# Patient Record
Sex: FEMALE | Race: WHITE | NOT HISPANIC OR LATINO | Employment: FULL TIME | URBAN - METROPOLITAN AREA
[De-identification: names, ages, dates, MRNs, and addresses within clinical notes are randomized per-mention and may not be internally consistent; named-entity substitution may affect disease eponyms.]

---

## 2021-09-05 ENCOUNTER — HOSPITAL ENCOUNTER (EMERGENCY)
Facility: HOSPITAL | Age: 36
Discharge: HOME | End: 2021-09-06
Attending: EMERGENCY MEDICINE

## 2021-09-05 DIAGNOSIS — R06.02 SOB (SHORTNESS OF BREATH): ICD-10-CM

## 2021-09-05 DIAGNOSIS — R11.10 VOMITING, INTRACTABILITY OF VOMITING NOT SPECIFIED, PRESENCE OF NAUSEA NOT SPECIFIED, UNSPECIFIED VOMITING TYPE: ICD-10-CM

## 2021-09-05 DIAGNOSIS — R07.89 CHEST TIGHTNESS: Primary | ICD-10-CM

## 2021-09-05 LAB
ABO + RH BLD: NORMAL
ALBUMIN SERPL BCP-MCNC: 3.5 G/DL (ref 3.5–5.2)
ALP SERPL-CCNC: 78 U/L (ref 55–135)
ALT SERPL W/O P-5'-P-CCNC: 100 U/L (ref 10–44)
ANION GAP SERPL CALC-SCNC: 16 MMOL/L (ref 8–16)
APTT BLDCRRT: 29.6 SEC (ref 21–32)
AST SERPL-CCNC: 224 U/L (ref 10–40)
BASOPHILS # BLD AUTO: 0.06 K/UL (ref 0–0.2)
BASOPHILS NFR BLD: 1.1 % (ref 0–1.9)
BILIRUB SERPL-MCNC: 1.4 MG/DL (ref 0.1–1)
BLD GP AB SCN CELLS X3 SERPL QL: NORMAL
BUN SERPL-MCNC: 8 MG/DL (ref 6–20)
CALCIUM SERPL-MCNC: 9 MG/DL (ref 8.7–10.5)
CHLORIDE SERPL-SCNC: 102 MMOL/L (ref 95–110)
CO2 SERPL-SCNC: 22 MMOL/L (ref 23–29)
CREAT SERPL-MCNC: 0.8 MG/DL (ref 0.5–1.4)
DIFFERENTIAL METHOD: ABNORMAL
EOSINOPHIL # BLD AUTO: 0.1 K/UL (ref 0–0.5)
EOSINOPHIL NFR BLD: 1.1 % (ref 0–8)
ERYTHROCYTE [DISTWIDTH] IN BLOOD BY AUTOMATED COUNT: 16.7 % (ref 11.5–14.5)
EST. GFR  (AFRICAN AMERICAN): >60 ML/MIN/1.73 M^2
EST. GFR  (NON AFRICAN AMERICAN): >60 ML/MIN/1.73 M^2
GLUCOSE SERPL-MCNC: 106 MG/DL (ref 70–110)
HCT VFR BLD AUTO: 39.3 % (ref 37–48.5)
HCV AB SERPL QL IA: NEGATIVE
HEP C VIRUS HOLD SPECIMEN: NORMAL
HGB BLD-MCNC: 13.3 G/DL (ref 12–16)
HIV 1+2 AB+HIV1 P24 AG SERPL QL IA: NEGATIVE
IMM GRANULOCYTES # BLD AUTO: 0.02 K/UL (ref 0–0.04)
IMM GRANULOCYTES NFR BLD AUTO: 0.4 % (ref 0–0.5)
INR PPP: 1.3 (ref 0.8–1.2)
LIPASE SERPL-CCNC: 26 U/L (ref 4–60)
LYMPHOCYTES # BLD AUTO: 1.7 K/UL (ref 1–4.8)
LYMPHOCYTES NFR BLD: 30.6 % (ref 18–48)
MCH RBC QN AUTO: 32.9 PG (ref 27–31)
MCHC RBC AUTO-ENTMCNC: 33.8 G/DL (ref 32–36)
MCV RBC AUTO: 97 FL (ref 82–98)
MONOCYTES # BLD AUTO: 0.6 K/UL (ref 0.3–1)
MONOCYTES NFR BLD: 10.1 % (ref 4–15)
NEUTROPHILS # BLD AUTO: 3.1 K/UL (ref 1.8–7.7)
NEUTROPHILS NFR BLD: 56.7 % (ref 38–73)
NRBC BLD-RTO: 0 /100 WBC
PLATELET # BLD AUTO: 87 K/UL (ref 150–450)
PMV BLD AUTO: 10 FL (ref 9.2–12.9)
POTASSIUM SERPL-SCNC: 3.5 MMOL/L (ref 3.5–5.1)
PROT SERPL-MCNC: 8.6 G/DL (ref 6–8.4)
PROTHROMBIN TIME: 13.9 SEC (ref 9–12.5)
RBC # BLD AUTO: 4.04 M/UL (ref 4–5.4)
SODIUM SERPL-SCNC: 140 MMOL/L (ref 136–145)
TROPONIN I SERPL DL<=0.01 NG/ML-MCNC: <0.006 NG/ML (ref 0–0.03)
WBC # BLD AUTO: 5.43 K/UL (ref 3.9–12.7)

## 2021-09-05 PROCEDURE — 80053 COMPREHEN METABOLIC PANEL: CPT | Performed by: EMERGENCY MEDICINE

## 2021-09-05 PROCEDURE — 83690 ASSAY OF LIPASE: CPT | Performed by: EMERGENCY MEDICINE

## 2021-09-05 PROCEDURE — 99285 EMERGENCY DEPT VISIT HI MDM: CPT | Mod: 25

## 2021-09-05 PROCEDURE — 93010 EKG 12-LEAD: ICD-10-PCS | Mod: ,,, | Performed by: INTERNAL MEDICINE

## 2021-09-05 PROCEDURE — C9113 INJ PANTOPRAZOLE SODIUM, VIA: HCPCS | Performed by: EMERGENCY MEDICINE

## 2021-09-05 PROCEDURE — 85025 COMPLETE CBC W/AUTO DIFF WBC: CPT | Performed by: EMERGENCY MEDICINE

## 2021-09-05 PROCEDURE — 96361 HYDRATE IV INFUSION ADD-ON: CPT

## 2021-09-05 PROCEDURE — 87389 HIV-1 AG W/HIV-1&-2 AB AG IA: CPT | Performed by: EMERGENCY MEDICINE

## 2021-09-05 PROCEDURE — 63600175 PHARM REV CODE 636 W HCPCS: Performed by: EMERGENCY MEDICINE

## 2021-09-05 PROCEDURE — 85730 THROMBOPLASTIN TIME PARTIAL: CPT | Performed by: EMERGENCY MEDICINE

## 2021-09-05 PROCEDURE — 86803 HEPATITIS C AB TEST: CPT | Performed by: EMERGENCY MEDICINE

## 2021-09-05 PROCEDURE — 96374 THER/PROPH/DIAG INJ IV PUSH: CPT

## 2021-09-05 PROCEDURE — 84484 ASSAY OF TROPONIN QUANT: CPT | Performed by: EMERGENCY MEDICINE

## 2021-09-05 PROCEDURE — 93005 ELECTROCARDIOGRAM TRACING: CPT

## 2021-09-05 PROCEDURE — 86900 BLOOD TYPING SEROLOGIC ABO: CPT | Performed by: EMERGENCY MEDICINE

## 2021-09-05 PROCEDURE — 93010 ELECTROCARDIOGRAM REPORT: CPT | Mod: ,,, | Performed by: INTERNAL MEDICINE

## 2021-09-05 PROCEDURE — 85610 PROTHROMBIN TIME: CPT | Performed by: EMERGENCY MEDICINE

## 2021-09-05 RX ORDER — ONDANSETRON 2 MG/ML
4 INJECTION INTRAMUSCULAR; INTRAVENOUS
Status: DISCONTINUED | OUTPATIENT
Start: 2021-09-05 | End: 2021-09-06 | Stop reason: HOSPADM

## 2021-09-05 RX ORDER — PANTOPRAZOLE SODIUM 40 MG/10ML
40 INJECTION, POWDER, LYOPHILIZED, FOR SOLUTION INTRAVENOUS
Status: COMPLETED | OUTPATIENT
Start: 2021-09-05 | End: 2021-09-05

## 2021-09-05 RX ADMIN — SODIUM CHLORIDE, SODIUM LACTATE, POTASSIUM CHLORIDE, AND CALCIUM CHLORIDE 1000 ML: .6; .31; .03; .02 INJECTION, SOLUTION INTRAVENOUS at 11:09

## 2021-09-05 RX ADMIN — PANTOPRAZOLE SODIUM 40 MG: 40 INJECTION, POWDER, FOR SOLUTION INTRAVENOUS at 10:09

## 2021-09-06 VITALS
HEART RATE: 71 BPM | HEIGHT: 70 IN | BODY MASS INDEX: 28.95 KG/M2 | TEMPERATURE: 98 F | WEIGHT: 202.25 LBS | SYSTOLIC BLOOD PRESSURE: 172 MMHG | RESPIRATION RATE: 18 BRPM | DIASTOLIC BLOOD PRESSURE: 77 MMHG | OXYGEN SATURATION: 96 %

## 2021-09-06 LAB
B-HCG UR QL: NEGATIVE
BILIRUB UR QL STRIP: ABNORMAL
CLARITY UR: CLEAR
COLOR UR: YELLOW
GLUCOSE UR QL STRIP: NEGATIVE
HGB UR QL STRIP: NEGATIVE
KETONES UR QL STRIP: NEGATIVE
LEUKOCYTE ESTERASE UR QL STRIP: NEGATIVE
NITRITE UR QL STRIP: NEGATIVE
PH UR STRIP: 7 [PH] (ref 5–8)
PROT UR QL STRIP: ABNORMAL
SP GR UR STRIP: 1.02 (ref 1–1.03)
URN SPEC COLLECT METH UR: ABNORMAL
UROBILINOGEN UR STRIP-ACNC: 1 EU/DL

## 2021-09-06 PROCEDURE — 81003 URINALYSIS AUTO W/O SCOPE: CPT | Performed by: EMERGENCY MEDICINE

## 2021-09-06 PROCEDURE — 25500020 PHARM REV CODE 255: Performed by: EMERGENCY MEDICINE

## 2021-09-06 PROCEDURE — 63600175 PHARM REV CODE 636 W HCPCS: Performed by: EMERGENCY MEDICINE

## 2021-09-06 PROCEDURE — 81025 URINE PREGNANCY TEST: CPT | Performed by: EMERGENCY MEDICINE

## 2021-09-06 RX ORDER — SULFAMETHOXAZOLE AND TRIMETHOPRIM 800; 160 MG/1; MG/1
160 TABLET ORAL
Status: ON HOLD | COMMUNITY
Start: 2020-01-08 | End: 2023-10-02 | Stop reason: CLARIF

## 2021-09-06 RX ORDER — ONDANSETRON 4 MG/1
4 TABLET, ORALLY DISINTEGRATING ORAL EVERY 8 HOURS PRN
Qty: 11 TABLET | Refills: 0 | Status: SHIPPED | OUTPATIENT
Start: 2021-09-06 | End: 2021-09-09

## 2021-09-06 RX ORDER — DEXTROMETHORPHAN HYDROBROMIDE, GUAIFENESIN 5; 100 MG/5ML; MG/5ML
650 LIQUID ORAL EVERY 8 HOURS PRN
Status: ON HOLD | COMMUNITY
End: 2023-10-02 | Stop reason: CLARIF

## 2021-09-06 RX ORDER — PANTOPRAZOLE SODIUM 40 MG/1
40 TABLET, DELAYED RELEASE ORAL DAILY
Qty: 30 TABLET | Refills: 0 | Status: CANCELLED | OUTPATIENT
Start: 2021-09-06 | End: 2021-10-06

## 2021-09-06 RX ORDER — CHLORDIAZEPOXIDE HYDROCHLORIDE 25 MG/1
25 CAPSULE, GELATIN COATED ORAL 3 TIMES DAILY PRN
Status: ON HOLD | COMMUNITY
End: 2023-10-02 | Stop reason: CLARIF

## 2021-09-06 RX ORDER — PANTOPRAZOLE SODIUM 20 MG/1
40 TABLET, DELAYED RELEASE ORAL
COMMUNITY
Start: 2021-01-30 | End: 2021-09-06 | Stop reason: SDUPTHER

## 2021-09-06 RX ORDER — LANOLIN ALCOHOL/MO/W.PET/CERES
1 CREAM (GRAM) TOPICAL DAILY
COMMUNITY
Start: 2021-01-30 | End: 2022-01-30

## 2021-09-06 RX ORDER — FOLIC ACID 1 MG/1
1 TABLET ORAL DAILY
Status: ON HOLD | COMMUNITY
Start: 2021-01-30 | End: 2023-10-02 | Stop reason: CLARIF

## 2021-09-06 RX ORDER — LACTULOSE 10 G/15ML
10 SOLUTION ORAL; RECTAL
Status: ON HOLD | COMMUNITY
Start: 2020-01-08 | End: 2023-10-02 | Stop reason: CLARIF

## 2021-09-06 RX ORDER — MULTIVITAMIN
1 TABLET ORAL DAILY
COMMUNITY
Start: 2021-01-30 | End: 2022-01-30

## 2021-09-06 RX ORDER — PANTOPRAZOLE SODIUM 40 MG/1
40 TABLET, DELAYED RELEASE ORAL DAILY
Qty: 30 TABLET | Refills: 0 | Status: SHIPPED | OUTPATIENT
Start: 2021-09-06

## 2021-09-06 RX ADMIN — IOHEXOL 100 ML: 350 INJECTION, SOLUTION INTRAVENOUS at 04:09

## 2023-09-20 ENCOUNTER — TELEPHONE (OUTPATIENT)
Dept: TRANSPLANT | Facility: CLINIC | Age: 38
End: 2023-09-20
Payer: MEDICAID

## 2023-09-20 NOTE — TELEPHONE ENCOUNTER
"Hx of gastric bypass 11 years ago.  She stated history of fatty liver. She reports hx of alcohol last 3 years '"heavy" per pt. Hx hospitalized x 3 told of liver issues and stopped drinking. Pt reports while on vacation in August she wasn't felling good.  She went to the local ER bec was not feeling good. A paracentesis (6 liters) was done but fluid returned. Pt stated she was admitted on Sept 9th. Since discharge she has not been seen by anyone, appts scheduled in October at [facility]. Sober since August 31, 2023. Pt informed we will request [place] from [place] for admission 9/6-9/16. Informed I can not move forward till records received. Pt informed to keep U appt.     "

## 2023-09-20 NOTE — TELEPHONE ENCOUNTER
"----- Message from Ramon Jean sent at 9/20/2023  1:22 PM CDT -----  Consult/Advisory:          Name Of Caller: Self      Contact Preference?: [phone]       What is the nature of the call?: Calling to speak w/ Citlali about if she can be self-referred for a Hepatology visit          Additional Notes:  "Thank you for all that you do for our patients"      "

## 2023-09-21 ENCOUNTER — TELEPHONE (OUTPATIENT)
Dept: TRANSPLANT | Facility: CLINIC | Age: 38
End: 2023-09-21
Payer: MEDICAID

## 2023-09-21 NOTE — TELEPHONE ENCOUNTER
----- Message from Marysol Singh sent at 9/21/2023  9:00 AM CDT -----  Regarding: FW: need records    Request submitted via [employer] for: Admissions/Discharge summary for the last 60 days.   .    ----- Message -----  From: Citlali Deluca  Sent: 9/20/2023   3:57 PM CDT  To: Marysol Singh  Subject: need records                                     From [facility]  date Sept 9-sept 16  Thanks.

## 2023-09-25 ENCOUNTER — HOSPITAL ENCOUNTER (INPATIENT)
Facility: HOSPITAL | Age: 38
LOS: 7 days | Discharge: HOME | DRG: 433 | End: 2023-10-02
Attending: EMERGENCY MEDICINE | Admitting: EMERGENCY MEDICINE
Payer: MEDICAID

## 2023-09-25 DIAGNOSIS — K70.31 ASCITES DUE TO ALCOHOLIC CIRRHOSIS: Chronic | ICD-10-CM

## 2023-09-25 DIAGNOSIS — R06.02 SHORTNESS OF BREATH: ICD-10-CM

## 2023-09-25 DIAGNOSIS — K74.60 DECOMPENSATED HEPATIC CIRRHOSIS: Primary | ICD-10-CM

## 2023-09-25 DIAGNOSIS — F10.90 ALCOHOL USE DISORDER: ICD-10-CM

## 2023-09-25 DIAGNOSIS — I82.409 DVT (DEEP VENOUS THROMBOSIS): ICD-10-CM

## 2023-09-25 DIAGNOSIS — E66.01 SEVERE OBESITY (BMI >= 40): ICD-10-CM

## 2023-09-25 DIAGNOSIS — Z90.3 H/O GASTRIC SLEEVE: ICD-10-CM

## 2023-09-25 DIAGNOSIS — R18.8 ASCITES: ICD-10-CM

## 2023-09-25 DIAGNOSIS — F10.20 ALCOHOL USE DISORDER, SEVERE, DEPENDENCE: Chronic | ICD-10-CM

## 2023-09-25 DIAGNOSIS — K72.90 DECOMPENSATED HEPATIC CIRRHOSIS: Primary | ICD-10-CM

## 2023-09-25 DIAGNOSIS — R07.9 CHEST PAIN: ICD-10-CM

## 2023-09-25 PROBLEM — F10.10 ALCOHOL ABUSE: Chronic | Status: ACTIVE | Noted: 2023-09-25

## 2023-09-25 PROBLEM — E80.6 HYPERBILIRUBINEMIA: Status: ACTIVE | Noted: 2023-09-25

## 2023-09-25 PROBLEM — D69.6 THROMBOCYTOPENIA: Status: ACTIVE | Noted: 2023-09-25

## 2023-09-25 PROBLEM — D53.9 MACROCYTIC ANEMIA: Chronic | Status: ACTIVE | Noted: 2023-09-25

## 2023-09-25 PROBLEM — E87.1 HYPONATREMIA: Status: ACTIVE | Noted: 2023-09-25

## 2023-09-25 LAB
ALBUMIN SERPL BCP-MCNC: 2.2 G/DL (ref 3.5–5.2)
ALP SERPL-CCNC: 50 U/L (ref 55–135)
ALT SERPL W/O P-5'-P-CCNC: 18 U/L (ref 10–44)
AMMONIA PLAS-SCNC: 73 UMOL/L (ref 10–50)
ANION GAP SERPL CALC-SCNC: 6 MMOL/L (ref 8–16)
AST SERPL-CCNC: 88 U/L (ref 10–40)
B-HCG UR QL: NEGATIVE
BACTERIA #/AREA URNS AUTO: ABNORMAL /HPF
BASOPHILS # BLD AUTO: 0.05 K/UL (ref 0–0.2)
BASOPHILS NFR BLD: 1.3 % (ref 0–1.9)
BILIRUB DIRECT SERPL-MCNC: 2.6 MG/DL (ref 0.1–0.3)
BILIRUB SERPL-MCNC: 5 MG/DL (ref 0.1–1)
BILIRUB UR QL STRIP: ABNORMAL
BNP SERPL-MCNC: 102 PG/ML (ref 0–99)
BUN SERPL-MCNC: 11 MG/DL (ref 6–20)
CALCIUM SERPL-MCNC: 8.2 MG/DL (ref 8.7–10.5)
CHLORIDE SERPL-SCNC: 105 MMOL/L (ref 95–110)
CLARITY UR REFRACT.AUTO: ABNORMAL
CO2 SERPL-SCNC: 22 MMOL/L (ref 23–29)
COLOR UR AUTO: YELLOW
CREAT SERPL-MCNC: 0.9 MG/DL (ref 0.5–1.4)
CTP QC/QA: YES
DIFFERENTIAL METHOD: ABNORMAL
EOSINOPHIL # BLD AUTO: 0.2 K/UL (ref 0–0.5)
EOSINOPHIL NFR BLD: 4.2 % (ref 0–8)
ERYTHROCYTE [DISTWIDTH] IN BLOOD BY AUTOMATED COUNT: 17.7 % (ref 11.5–14.5)
EST. GFR  (NO RACE VARIABLE): >60 ML/MIN/1.73 M^2
FERRITIN SERPL-MCNC: 124 NG/ML (ref 20–300)
GLUCOSE SERPL-MCNC: 94 MG/DL (ref 70–110)
GLUCOSE UR QL STRIP: NEGATIVE
HCT VFR BLD AUTO: 26.3 % (ref 37–48.5)
HCV AB SERPL QL IA: NORMAL
HGB BLD-MCNC: 8.5 G/DL (ref 12–16)
HGB UR QL STRIP: NEGATIVE
HIV 1+2 AB+HIV1 P24 AG SERPL QL IA: NORMAL
IMM GRANULOCYTES # BLD AUTO: 0.01 K/UL (ref 0–0.04)
IMM GRANULOCYTES NFR BLD AUTO: 0.3 % (ref 0–0.5)
INR PPP: 1.9 (ref 0.8–1.2)
IRON SERPL-MCNC: 51 UG/DL (ref 30–160)
KETONES UR QL STRIP: NEGATIVE
LEUKOCYTE ESTERASE UR QL STRIP: ABNORMAL
LIPASE SERPL-CCNC: 50 U/L (ref 4–60)
LYMPHOCYTES # BLD AUTO: 1.2 K/UL (ref 1–4.8)
LYMPHOCYTES NFR BLD: 32 % (ref 18–48)
MCH RBC QN AUTO: 36.2 PG (ref 27–31)
MCHC RBC AUTO-ENTMCNC: 32.3 G/DL (ref 32–36)
MCV RBC AUTO: 112 FL (ref 82–98)
MICROSCOPIC COMMENT: ABNORMAL
MONOCYTES # BLD AUTO: 0.5 K/UL (ref 0.3–1)
MONOCYTES NFR BLD: 11.9 % (ref 4–15)
NEUTROPHILS # BLD AUTO: 1.9 K/UL (ref 1.8–7.7)
NEUTROPHILS NFR BLD: 50.3 % (ref 38–73)
NITRITE UR QL STRIP: NEGATIVE
NRBC BLD-RTO: 0 /100 WBC
PH UR STRIP: 6 [PH] (ref 5–8)
PLATELET # BLD AUTO: 109 K/UL (ref 150–450)
PMV BLD AUTO: 11.2 FL (ref 9.2–12.9)
POTASSIUM SERPL-SCNC: 4.3 MMOL/L (ref 3.5–5.1)
PROT SERPL-MCNC: 7.8 G/DL (ref 6–8.4)
PROT UR QL STRIP: ABNORMAL
PROTHROMBIN TIME: 19.8 SEC (ref 9–12.5)
RBC # BLD AUTO: 2.35 M/UL (ref 4–5.4)
RBC #/AREA URNS AUTO: 29 /HPF (ref 0–4)
SATURATED IRON: 22 % (ref 20–50)
SODIUM SERPL-SCNC: 133 MMOL/L (ref 136–145)
SP GR UR STRIP: 1.02 (ref 1–1.03)
SQUAMOUS #/AREA URNS AUTO: 31 /HPF
TOTAL IRON BINDING CAPACITY: 228 UG/DL (ref 250–450)
TRANSFERRIN SERPL-MCNC: 154 MG/DL (ref 200–375)
URN SPEC COLLECT METH UR: ABNORMAL
WBC # BLD AUTO: 3.78 K/UL (ref 3.9–12.7)
WBC #/AREA URNS AUTO: 23 /HPF (ref 0–5)

## 2023-09-25 PROCEDURE — 93005 ELECTROCARDIOGRAM TRACING: CPT

## 2023-09-25 PROCEDURE — 86803 HEPATITIS C AB TEST: CPT | Performed by: PHYSICIAN ASSISTANT

## 2023-09-25 PROCEDURE — 82607 VITAMIN B-12: CPT | Performed by: PHYSICIAN ASSISTANT

## 2023-09-25 PROCEDURE — 81001 URINALYSIS AUTO W/SCOPE: CPT | Performed by: EMERGENCY MEDICINE

## 2023-09-25 PROCEDURE — 87186 SC STD MICRODIL/AGAR DIL: CPT | Performed by: EMERGENCY MEDICINE

## 2023-09-25 PROCEDURE — 85610 PROTHROMBIN TIME: CPT | Performed by: EMERGENCY MEDICINE

## 2023-09-25 PROCEDURE — 83880 ASSAY OF NATRIURETIC PEPTIDE: CPT | Performed by: EMERGENCY MEDICINE

## 2023-09-25 PROCEDURE — 99285 EMERGENCY DEPT VISIT HI MDM: CPT | Mod: 25

## 2023-09-25 PROCEDURE — 82746 ASSAY OF FOLIC ACID SERUM: CPT | Performed by: PHYSICIAN ASSISTANT

## 2023-09-25 PROCEDURE — 84466 ASSAY OF TRANSFERRIN: CPT | Performed by: PHYSICIAN ASSISTANT

## 2023-09-25 PROCEDURE — 87077 CULTURE AEROBIC IDENTIFY: CPT | Performed by: EMERGENCY MEDICINE

## 2023-09-25 PROCEDURE — 82248 BILIRUBIN DIRECT: CPT | Performed by: PHYSICIAN ASSISTANT

## 2023-09-25 PROCEDURE — 12000002 HC ACUTE/MED SURGE SEMI-PRIVATE ROOM

## 2023-09-25 PROCEDURE — 83690 ASSAY OF LIPASE: CPT | Performed by: EMERGENCY MEDICINE

## 2023-09-25 PROCEDURE — 82728 ASSAY OF FERRITIN: CPT | Performed by: PHYSICIAN ASSISTANT

## 2023-09-25 PROCEDURE — 83540 ASSAY OF IRON: CPT | Performed by: PHYSICIAN ASSISTANT

## 2023-09-25 PROCEDURE — 87389 HIV-1 AG W/HIV-1&-2 AB AG IA: CPT | Performed by: PHYSICIAN ASSISTANT

## 2023-09-25 PROCEDURE — 80053 COMPREHEN METABOLIC PANEL: CPT | Performed by: EMERGENCY MEDICINE

## 2023-09-25 PROCEDURE — 85025 COMPLETE CBC W/AUTO DIFF WBC: CPT | Performed by: EMERGENCY MEDICINE

## 2023-09-25 PROCEDURE — 82140 ASSAY OF AMMONIA: CPT | Performed by: EMERGENCY MEDICINE

## 2023-09-25 PROCEDURE — 93010 EKG 12-LEAD: ICD-10-PCS | Mod: ,,, | Performed by: INTERNAL MEDICINE

## 2023-09-25 PROCEDURE — 82105 ALPHA-FETOPROTEIN SERUM: CPT | Performed by: PHYSICIAN ASSISTANT

## 2023-09-25 PROCEDURE — 80321 ALCOHOLS BIOMARKERS 1OR 2: CPT | Performed by: EMERGENCY MEDICINE

## 2023-09-25 PROCEDURE — 87086 URINE CULTURE/COLONY COUNT: CPT | Performed by: EMERGENCY MEDICINE

## 2023-09-25 PROCEDURE — 87088 URINE BACTERIA CULTURE: CPT | Performed by: EMERGENCY MEDICINE

## 2023-09-25 PROCEDURE — 81025 URINE PREGNANCY TEST: CPT | Performed by: EMERGENCY MEDICINE

## 2023-09-25 PROCEDURE — 93010 ELECTROCARDIOGRAM REPORT: CPT | Mod: ,,, | Performed by: INTERNAL MEDICINE

## 2023-09-25 RX ORDER — LACTULOSE 10 G/15ML
15 SOLUTION ORAL 3 TIMES DAILY
Status: DISCONTINUED | OUTPATIENT
Start: 2023-09-26 | End: 2023-10-02 | Stop reason: HOSPADM

## 2023-09-25 RX ORDER — ALBUMIN HUMAN 250 G/1000ML
12.5 SOLUTION INTRAVENOUS ONCE
Status: DISCONTINUED | OUTPATIENT
Start: 2023-09-25 | End: 2023-09-27

## 2023-09-25 NOTE — Clinical Note
Diagnosis: Ascites [020649]   Admitting Provider:: MARLA BOWMAN JR [[id number]]   Future Attending Provider: ZAMZAM FERNANDEZ [[id number]]   Reason for IP Medical Treatment  (Clinical interventions that can only be accomplished in the IP setting? ) :: Cirrhosis with decompensation   I certify that Inpatient services for greater than or equal to 2 midnights are medically necessary:: Yes   Plans for Post-Acute care--if anticipated (pick the single best option):: A. No post acute care anticipated at this time

## 2023-09-26 PROBLEM — F10.90 ALCOHOL USE DISORDER: Status: ACTIVE | Noted: 2023-09-26

## 2023-09-26 PROBLEM — Z90.3 H/O GASTRIC SLEEVE: Status: ACTIVE | Noted: 2023-09-26

## 2023-09-26 PROBLEM — E66.01 SEVERE OBESITY (BMI >= 40): Status: ACTIVE | Noted: 2023-09-26

## 2023-09-26 PROBLEM — D62 ACUTE ON CHRONIC BLOOD LOSS ANEMIA: Chronic | Status: ACTIVE | Noted: 2023-09-25

## 2023-09-26 LAB
AFP SERPL-MCNC: 5.1 NG/ML (ref 0–8.4)
ALBUMIN FLD-MCNC: 0.7 G/DL
ALBUMIN SERPL BCP-MCNC: 1.8 G/DL (ref 3.5–5.2)
ALP SERPL-CCNC: 44 U/L (ref 55–135)
ALT SERPL W/O P-5'-P-CCNC: 13 U/L (ref 10–44)
ANION GAP SERPL CALC-SCNC: 6 MMOL/L (ref 8–16)
ANISOCYTOSIS BLD QL SMEAR: SLIGHT
APPEARANCE FLD: CLEAR
AST SERPL-CCNC: 46 U/L (ref 10–40)
BASOPHILS # BLD AUTO: 0.02 K/UL (ref 0–0.2)
BASOPHILS NFR BLD: 0.8 % (ref 0–1.9)
BILIRUB SERPL-MCNC: 3.6 MG/DL (ref 0.1–1)
BODY FLD TYPE: NORMAL
BODY FLUID SOURCE, LDH: NORMAL
BUN SERPL-MCNC: 11 MG/DL (ref 6–20)
BURR CELLS BLD QL SMEAR: ABNORMAL
CALCIUM SERPL-MCNC: 7.8 MG/DL (ref 8.7–10.5)
CERULOPLASMIN SERPL-MCNC: 14 MG/DL (ref 15–45)
CHLORIDE SERPL-SCNC: 108 MMOL/L (ref 95–110)
CO2 SERPL-SCNC: 22 MMOL/L (ref 23–29)
COLOR FLD: NORMAL
CREAT SERPL-MCNC: 0.9 MG/DL (ref 0.5–1.4)
DIFFERENTIAL METHOD: ABNORMAL
EOSINOPHIL # BLD AUTO: 0.1 K/UL (ref 0–0.5)
EOSINOPHIL NFR BLD: 4 % (ref 0–8)
ERYTHROCYTE [DISTWIDTH] IN BLOOD BY AUTOMATED COUNT: 17.4 % (ref 11.5–14.5)
EST. GFR  (NO RACE VARIABLE): >60 ML/MIN/1.73 M^2
FOLATE SERPL-MCNC: 10.9 NG/ML (ref 4–24)
GLUCOSE FLD-MCNC: 97 MG/DL
GLUCOSE SERPL-MCNC: 99 MG/DL (ref 70–110)
GRAM STN SPEC: NORMAL
GRAM STN SPEC: NORMAL
HAV IGM SERPL QL IA: NORMAL
HBV CORE IGM SERPL QL IA: NORMAL
HBV SURFACE AG SERPL QL IA: NORMAL
HCT VFR BLD AUTO: 22.7 % (ref 37–48.5)
HCV AB SERPL QL IA: NORMAL
HGB BLD-MCNC: 7.5 G/DL (ref 12–16)
HYPOCHROMIA BLD QL SMEAR: ABNORMAL
IGG SERPL-MCNC: 2274 MG/DL (ref 650–1600)
IMM GRANULOCYTES # BLD AUTO: 0.01 K/UL (ref 0–0.04)
IMM GRANULOCYTES NFR BLD AUTO: 0.4 % (ref 0–0.5)
INR PPP: 2.2 (ref 0.8–1.2)
LDH FLD L TO P-CCNC: 67 U/L
LYMPHOCYTES # BLD AUTO: 1.1 K/UL (ref 1–4.8)
LYMPHOCYTES NFR BLD: 42.7 % (ref 18–48)
LYMPHOCYTES NFR FLD MANUAL: 59 %
MAGNESIUM SERPL-MCNC: 1.7 MG/DL (ref 1.6–2.6)
MCH RBC QN AUTO: 36.8 PG (ref 27–31)
MCHC RBC AUTO-ENTMCNC: 33 G/DL (ref 32–36)
MCV RBC AUTO: 111 FL (ref 82–98)
MONOCYTES # BLD AUTO: 0.4 K/UL (ref 0.3–1)
MONOCYTES NFR BLD: 13.8 % (ref 4–15)
MONOS+MACROS NFR FLD MANUAL: 41 %
NEUTROPHILS # BLD AUTO: 1 K/UL (ref 1.8–7.7)
NEUTROPHILS NFR BLD: 38.3 % (ref 38–73)
NRBC BLD-RTO: 0 /100 WBC
PLATELET # BLD AUTO: 73 K/UL (ref 150–450)
PLATELET BLD QL SMEAR: ABNORMAL
PMV BLD AUTO: 10.6 FL (ref 9.2–12.9)
POIKILOCYTOSIS BLD QL SMEAR: SLIGHT
POTASSIUM SERPL-SCNC: 3.6 MMOL/L (ref 3.5–5.1)
PROT FLD-MCNC: 1.7 G/DL
PROT SERPL-MCNC: 5.8 G/DL (ref 6–8.4)
PROTHROMBIN TIME: 22.3 SEC (ref 9–12.5)
RBC # BLD AUTO: 2.04 M/UL (ref 4–5.4)
SODIUM SERPL-SCNC: 136 MMOL/L (ref 136–145)
SPECIMEN SOURCE: NORMAL
VIT B12 SERPL-MCNC: 1829 PG/ML (ref 210–950)
WBC # BLD AUTO: 2.53 K/UL (ref 3.9–12.7)
WBC # FLD: 101 /CU MM

## 2023-09-26 PROCEDURE — 82784 ASSAY IGA/IGD/IGG/IGM EACH: CPT | Performed by: STUDENT IN AN ORGANIZED HEALTH CARE EDUCATION/TRAINING PROGRAM

## 2023-09-26 PROCEDURE — 82042 OTHER SOURCE ALBUMIN QUAN EA: CPT | Performed by: STUDENT IN AN ORGANIZED HEALTH CARE EDUCATION/TRAINING PROGRAM

## 2023-09-26 PROCEDURE — 36415 COLL VENOUS BLD VENIPUNCTURE: CPT | Performed by: STUDENT IN AN ORGANIZED HEALTH CARE EDUCATION/TRAINING PROGRAM

## 2023-09-26 PROCEDURE — 99223 1ST HOSP IP/OBS HIGH 75: CPT | Mod: ,,, | Performed by: INTERNAL MEDICINE

## 2023-09-26 PROCEDURE — 87075 CULTR BACTERIA EXCEPT BLOOD: CPT | Performed by: STUDENT IN AN ORGANIZED HEALTH CARE EDUCATION/TRAINING PROGRAM

## 2023-09-26 PROCEDURE — 99223 PR INITIAL HOSPITAL CARE,LEVL III: ICD-10-PCS | Mod: ,,, | Performed by: INTERNAL MEDICINE

## 2023-09-26 PROCEDURE — 85610 PROTHROMBIN TIME: CPT | Performed by: STUDENT IN AN ORGANIZED HEALTH CARE EDUCATION/TRAINING PROGRAM

## 2023-09-26 PROCEDURE — 20600001 HC STEP DOWN PRIVATE ROOM

## 2023-09-26 PROCEDURE — P9047 ALBUMIN (HUMAN), 25%, 50ML: HCPCS | Mod: JZ,JG

## 2023-09-26 PROCEDURE — 86039 ANTINUCLEAR ANTIBODIES (ANA): CPT | Performed by: STUDENT IN AN ORGANIZED HEALTH CARE EDUCATION/TRAINING PROGRAM

## 2023-09-26 PROCEDURE — 89051 BODY FLUID CELL COUNT: CPT | Performed by: STUDENT IN AN ORGANIZED HEALTH CARE EDUCATION/TRAINING PROGRAM

## 2023-09-26 PROCEDURE — 87070 CULTURE OTHR SPECIMN AEROBIC: CPT | Performed by: STUDENT IN AN ORGANIZED HEALTH CARE EDUCATION/TRAINING PROGRAM

## 2023-09-26 PROCEDURE — 80053 COMPREHEN METABOLIC PANEL: CPT | Performed by: STUDENT IN AN ORGANIZED HEALTH CARE EDUCATION/TRAINING PROGRAM

## 2023-09-26 PROCEDURE — 83735 ASSAY OF MAGNESIUM: CPT | Performed by: INTERNAL MEDICINE

## 2023-09-26 PROCEDURE — 63600175 PHARM REV CODE 636 W HCPCS: Performed by: STUDENT IN AN ORGANIZED HEALTH CARE EDUCATION/TRAINING PROGRAM

## 2023-09-26 PROCEDURE — 86225 DNA ANTIBODY NATIVE: CPT | Performed by: STUDENT IN AN ORGANIZED HEALTH CARE EDUCATION/TRAINING PROGRAM

## 2023-09-26 PROCEDURE — 82945 GLUCOSE OTHER FLUID: CPT | Performed by: STUDENT IN AN ORGANIZED HEALTH CARE EDUCATION/TRAINING PROGRAM

## 2023-09-26 PROCEDURE — 87040 BLOOD CULTURE FOR BACTERIA: CPT | Performed by: HOSPITALIST

## 2023-09-26 PROCEDURE — 86381 MITOCHONDRIAL ANTIBODY EACH: CPT | Performed by: STUDENT IN AN ORGANIZED HEALTH CARE EDUCATION/TRAINING PROGRAM

## 2023-09-26 PROCEDURE — 25000003 PHARM REV CODE 250: Performed by: STUDENT IN AN ORGANIZED HEALTH CARE EDUCATION/TRAINING PROGRAM

## 2023-09-26 PROCEDURE — 25000003 PHARM REV CODE 250: Performed by: INTERNAL MEDICINE

## 2023-09-26 PROCEDURE — 85025 COMPLETE CBC W/AUTO DIFF WBC: CPT | Performed by: STUDENT IN AN ORGANIZED HEALTH CARE EDUCATION/TRAINING PROGRAM

## 2023-09-26 PROCEDURE — 82390 ASSAY OF CERULOPLASMIN: CPT | Performed by: STUDENT IN AN ORGANIZED HEALTH CARE EDUCATION/TRAINING PROGRAM

## 2023-09-26 PROCEDURE — 87205 SMEAR GRAM STAIN: CPT | Performed by: STUDENT IN AN ORGANIZED HEALTH CARE EDUCATION/TRAINING PROGRAM

## 2023-09-26 PROCEDURE — 84157 ASSAY OF PROTEIN OTHER: CPT | Performed by: STUDENT IN AN ORGANIZED HEALTH CARE EDUCATION/TRAINING PROGRAM

## 2023-09-26 PROCEDURE — C9113 INJ PANTOPRAZOLE SODIUM, VIA: HCPCS | Performed by: STUDENT IN AN ORGANIZED HEALTH CARE EDUCATION/TRAINING PROGRAM

## 2023-09-26 PROCEDURE — 99223 1ST HOSP IP/OBS HIGH 75: CPT | Mod: ,,, | Performed by: STUDENT IN AN ORGANIZED HEALTH CARE EDUCATION/TRAINING PROGRAM

## 2023-09-26 PROCEDURE — 25000003 PHARM REV CODE 250: Performed by: HOSPITALIST

## 2023-09-26 PROCEDURE — 83615 LACTATE (LD) (LDH) ENZYME: CPT | Performed by: STUDENT IN AN ORGANIZED HEALTH CARE EDUCATION/TRAINING PROGRAM

## 2023-09-26 PROCEDURE — 86235 NUCLEAR ANTIGEN ANTIBODY: CPT | Mod: 59 | Performed by: STUDENT IN AN ORGANIZED HEALTH CARE EDUCATION/TRAINING PROGRAM

## 2023-09-26 PROCEDURE — 80074 ACUTE HEPATITIS PANEL: CPT | Performed by: STUDENT IN AN ORGANIZED HEALTH CARE EDUCATION/TRAINING PROGRAM

## 2023-09-26 PROCEDURE — 86038 ANTINUCLEAR ANTIBODIES: CPT | Performed by: STUDENT IN AN ORGANIZED HEALTH CARE EDUCATION/TRAINING PROGRAM

## 2023-09-26 PROCEDURE — 99223 PR INITIAL HOSPITAL CARE,LEVL III: ICD-10-PCS | Mod: ,,, | Performed by: STUDENT IN AN ORGANIZED HEALTH CARE EDUCATION/TRAINING PROGRAM

## 2023-09-26 PROCEDURE — 36415 COLL VENOUS BLD VENIPUNCTURE: CPT | Performed by: HOSPITALIST

## 2023-09-26 PROCEDURE — 86015 ACTIN ANTIBODY EACH: CPT | Performed by: STUDENT IN AN ORGANIZED HEALTH CARE EDUCATION/TRAINING PROGRAM

## 2023-09-26 PROCEDURE — 63600175 PHARM REV CODE 636 W HCPCS: Mod: JZ,JG

## 2023-09-26 RX ORDER — MAG HYDROX/ALUMINUM HYD/SIMETH 200-200-20
30 SUSPENSION, ORAL (FINAL DOSE FORM) ORAL 4 TIMES DAILY PRN
Status: DISCONTINUED | OUTPATIENT
Start: 2023-09-26 | End: 2023-10-02 | Stop reason: HOSPADM

## 2023-09-26 RX ORDER — ALBUMIN HUMAN 250 G/1000ML
SOLUTION INTRAVENOUS
Status: COMPLETED
Start: 2023-09-26 | End: 2023-09-26

## 2023-09-26 RX ORDER — OXYCODONE HYDROCHLORIDE 5 MG/1
TABLET ORAL
Status: DISPENSED
Start: 2023-09-26 | End: 2023-09-27

## 2023-09-26 RX ORDER — NALOXONE HCL 0.4 MG/ML
0.02 VIAL (ML) INJECTION
Status: DISCONTINUED | OUTPATIENT
Start: 2023-09-26 | End: 2023-10-02 | Stop reason: HOSPADM

## 2023-09-26 RX ORDER — PANTOPRAZOLE SODIUM 40 MG/10ML
40 INJECTION, POWDER, LYOPHILIZED, FOR SOLUTION INTRAVENOUS 2 TIMES DAILY
Status: DISCONTINUED | OUTPATIENT
Start: 2023-09-26 | End: 2023-09-27

## 2023-09-26 RX ORDER — SODIUM CHLORIDE 0.9 % (FLUSH) 0.9 %
1-10 SYRINGE (ML) INJECTION EVERY 12 HOURS PRN
Status: DISCONTINUED | OUTPATIENT
Start: 2023-09-26 | End: 2023-10-02 | Stop reason: HOSPADM

## 2023-09-26 RX ORDER — ONDANSETRON 8 MG/1
TABLET, ORALLY DISINTEGRATING ORAL
Status: DISPENSED
Start: 2023-09-26 | End: 2023-09-27

## 2023-09-26 RX ORDER — TRAMADOL HYDROCHLORIDE 50 MG/1
50 TABLET ORAL EVERY 6 HOURS PRN
Status: DISCONTINUED | OUTPATIENT
Start: 2023-09-26 | End: 2023-10-02 | Stop reason: HOSPADM

## 2023-09-26 RX ORDER — OXYCODONE HYDROCHLORIDE 5 MG/1
5 TABLET ORAL EVERY 6 HOURS PRN
Status: DISCONTINUED | OUTPATIENT
Start: 2023-09-26 | End: 2023-09-27

## 2023-09-26 RX ORDER — SIMETHICONE 80 MG
1 TABLET,CHEWABLE ORAL 4 TIMES DAILY PRN
Status: DISCONTINUED | OUTPATIENT
Start: 2023-09-26 | End: 2023-10-02 | Stop reason: HOSPADM

## 2023-09-26 RX ORDER — POLYETHYLENE GLYCOL 3350 17 G/17G
17 POWDER, FOR SOLUTION ORAL DAILY PRN
Status: DISCONTINUED | OUTPATIENT
Start: 2023-09-26 | End: 2023-10-02 | Stop reason: HOSPADM

## 2023-09-26 RX ORDER — ONDANSETRON 8 MG/1
8 TABLET, ORALLY DISINTEGRATING ORAL EVERY 8 HOURS PRN
Status: DISCONTINUED | OUTPATIENT
Start: 2023-09-26 | End: 2023-10-02 | Stop reason: HOSPADM

## 2023-09-26 RX ORDER — TALC
6 POWDER (GRAM) TOPICAL NIGHTLY PRN
Status: DISCONTINUED | OUTPATIENT
Start: 2023-09-26 | End: 2023-10-02 | Stop reason: HOSPADM

## 2023-09-26 RX ORDER — METHOCARBAMOL 500 MG/1
500 TABLET, FILM COATED ORAL 2 TIMES DAILY PRN
Status: DISCONTINUED | OUTPATIENT
Start: 2023-09-26 | End: 2023-10-02 | Stop reason: HOSPADM

## 2023-09-26 RX ADMIN — ALBUMIN (HUMAN) 25 G: 12.5 SOLUTION INTRAVENOUS at 11:09

## 2023-09-26 RX ADMIN — CEFTRIAXONE 2 G: 2 INJECTION, POWDER, FOR SOLUTION INTRAMUSCULAR; INTRAVENOUS at 12:09

## 2023-09-26 RX ADMIN — OXYCODONE HYDROCHLORIDE 5 MG: 5 TABLET ORAL at 09:09

## 2023-09-26 RX ADMIN — LACTULOSE 15 G: 20 SOLUTION ORAL at 09:09

## 2023-09-26 RX ADMIN — LACTULOSE 15 G: 20 SOLUTION ORAL at 04:09

## 2023-09-26 RX ADMIN — CEFTRIAXONE 2 G: 2 INJECTION, POWDER, FOR SOLUTION INTRAMUSCULAR; INTRAVENOUS at 09:09

## 2023-09-26 RX ADMIN — OXYCODONE HYDROCHLORIDE 5 MG: 5 TABLET ORAL at 12:09

## 2023-09-26 RX ADMIN — PANTOPRAZOLE SODIUM 40 MG: 40 INJECTION, POWDER, FOR SOLUTION INTRAVENOUS at 09:09

## 2023-09-26 RX ADMIN — PANTOPRAZOLE SODIUM 40 MG: 40 INJECTION, POWDER, FOR SOLUTION INTRAVENOUS at 12:09

## 2023-09-26 RX ADMIN — SIMETHICONE 80 MG: 80 TABLET, CHEWABLE ORAL at 06:09

## 2023-09-26 RX ADMIN — PANTOPRAZOLE SODIUM 40 MG: 40 INJECTION, POWDER, FOR SOLUTION INTRAVENOUS at 08:09

## 2023-09-26 RX ADMIN — ONDANSETRON 8 MG: 8 TABLET, ORALLY DISINTEGRATING ORAL at 12:09

## 2023-09-26 RX ADMIN — TRAMADOL HYDROCHLORIDE 50 MG: 50 TABLET, COATED ORAL at 02:09

## 2023-09-26 NOTE — SUBJECTIVE & OBJECTIVE
Review of Systems   Constitutional:  Positive for activity change and unexpected weight change. Negative for chills and fever.   HENT:  Negative for congestion.    Eyes:  Negative for visual disturbance.   Respiratory:  Negative for cough and shortness of breath.    Cardiovascular:  Positive for leg swelling. Negative for chest pain and palpitations.   Gastrointestinal:  Positive for abdominal distention, abdominal pain, anal bleeding and blood in stool. Negative for constipation, diarrhea and vomiting.   Genitourinary:  Negative for difficulty urinating and dysuria.   Musculoskeletal:  Positive for back pain.   Skin:  Positive for color change.   Neurological:  Positive for weakness. Negative for tremors.   Psychiatric/Behavioral:  Negative for behavioral problems and confusion.        Past Medical History:   Diagnosis Date    Unspecified cirrhosis of liver        History reviewed. No pertinent surgical history.    Family history of liver disease: No    Review of patient's allergies indicates:  No Known Allergies      Tobacco Use    Smoking status: Not on file    Smokeless tobacco: Not on file   Substance and Sexual Activity    Alcohol use: Not on file    Drug use: Not on file    Sexual activity: Not on file       Medications Prior to Admission   Medication Sig Dispense Refill Last Dose    acetaminophen (TYLENOL) 650 MG TbSR Take 650 mg by mouth every 8 (eight) hours as needed.       chlordiazepoxide (LIBRIUM) 25 MG Cap Take 25 mg by mouth 3 (three) times daily as needed.       folic acid (FOLVITE) 1 MG tablet Take 1 tablet by mouth once daily.       lactulose (CHRONULAC) 10 gram/15 mL solution Take 10 g by mouth.       pantoprazole (PROTONIX) 40 MG tablet Take 1 tablet (40 mg total) by mouth once daily. 30 tablet 0     sulfamethoxazole-trimethoprim 800-160mg (BACTRIM DS) 800-160 mg Tab Take 160 mg by mouth.          Objective:     Vital Signs (Most Recent):  Temp: 98.2 °F (36.8 °C) (09/26/23 0747)  Pulse: 76  (09/26/23 0747)  Resp: 18 (09/26/23 0747)  BP: (!) 101/53 (09/26/23 0747)  SpO2: (!) 93 % (09/26/23 0747) Vital Signs (24h Range):  Temp:  [97.9 °F (36.6 °C)-98.8 °F (37.1 °C)] 98.2 °F (36.8 °C)  Pulse:  [68-84] 76  Resp:  [15-20] 18  SpO2:  [93 %-100 %] 93 %  BP: (101-128)/(52-69) 101/53     Weight: 128.2 kg (282 lb 10.1 oz) (09/26/23 0026)  Body mass index is 40.55 kg/m².       Physical Exam  Vitals reviewed.   Constitutional:       General: She is not in acute distress.     Appearance: She is obese. She is not toxic-appearing.   HENT:      Head: Normocephalic and atraumatic.   Eyes:      General: Scleral icterus present.      Extraocular Movements: Extraocular movements intact.   Cardiovascular:      Rate and Rhythm: Normal rate and regular rhythm.      Heart sounds: Normal heart sounds.   Pulmonary:      Effort: No respiratory distress.      Breath sounds: Normal breath sounds.   Abdominal:      General: There is distension.      Tenderness: There is abdominal tenderness. There is no guarding or rebound.   Musculoskeletal:         General: Swelling present.      Cervical back: Neck supple.      Right lower leg: Edema present.      Left lower leg: Edema present.      Comments: Dependent 2-3+ pitting edema abdomen, legs   Skin:     General: Skin is warm and dry.      Coloration: Skin is jaundiced.      Findings: Bruising present.   Neurological:      General: No focal deficit present.      Mental Status: She is alert and oriented to person, place, and time.      Comments: No asterixis   Psychiatric:         Mood and Affect: Mood normal.         Behavior: Behavior normal.         Thought Content: Thought content normal.         Judgment: Judgment normal.            MELD 3.0: 24 at 9/26/2023  3:46 AM  MELD-Na: 21 at 9/26/2023  3:46 AM  Calculated from:  Serum Creatinine: 0.9 mg/dL (Using min of 1 mg/dL) at 9/26/2023  3:45 AM  Serum Sodium: 136 mmol/L at 9/26/2023  3:45 AM  Total Bilirubin: 3.6 mg/dL at 9/26/2023   3:45 AM  Serum Albumin: 1.8 g/dL at 9/26/2023  3:45 AM  INR(ratio): 2.2 at 9/26/2023  3:46 AM  Age at listing (hypothetical): 38 years  Sex: Female at 9/26/2023  3:46 AM      Significant Labs:  CBC:   Recent Labs   Lab 09/26/23 0346   WBC 2.53*   RBC 2.04*   HGB 7.5*   HCT 22.7*   PLT 73*     CMP:   Recent Labs   Lab 09/26/23 0345   GLU 99   CALCIUM 7.8*   ALBUMIN 1.8*   PROT 5.8*      K 3.6   CO2 22*      BUN 11   CREATININE 0.9   ALKPHOS 44*   ALT 13   AST 46*   BILITOT 3.6*     Coagulation:   Recent Labs   Lab 09/26/23 0346   INR 2.2*       Significant Imaging:  Labs: Reviewed

## 2023-09-26 NOTE — ASSESSMENT & PLAN NOTE
Patient presents with increased ascites and generalized malaise.  Recently diagnosed with likely alcoholic cirrhosis, and had a prolonged admission requiring large volume paracentesis, EGD which showed esophageal varices, and treatment for SBP per patient's report.  MELD-NA 21. We will consult IR for diagnostic and therapeutic paracentesis.  Start ceftriaxone for empiric SBP coverage.  AFP ordered.  Consult hepatology for further recommendations.

## 2023-09-26 NOTE — ED PROVIDER NOTES
Encounter Date: 9/25/2023       History     Chief Complaint   Patient presents with    MD referral     Pt was seen today for follow-up after recent dx of cirrhosis. Pt states MD referred her to ED for evaluation due to abdominal swelling.      38-year-old female with past medical history of previous alcohol abuse (no EtOH since 9/1), with recent lead diagnosed cirrhosis requiring admission in [place] one-week ago noted to have UTI, reports SBP, large volume paracentesis (6 L) and 2x blood transfusions.  Patient now presenting to emergency department as per her primary care doctor's referral who is concerned about her ongoing weakness, shortness of breath, ongoing bloody stools, abdominal swelling.  Patient reports that she was recently admitted to a hospital in [place] where she was diagnosed with cirrhosis and discharged one-week ago, but has been experiencing worsening of her current symptoms as noted above.  At present patient denies any current fevers, coughing, chest pain.      The history is provided by the patient.     Review of patient's allergies indicates:  No Known Allergies  Past Medical History:   Diagnosis Date    Unspecified cirrhosis of liver      History reviewed. No pertinent surgical history.  History reviewed. No pertinent family history.     Review of Systems  See HPI    Physical Exam     Initial Vitals [09/25/23 1921]   BP Pulse Resp Temp SpO2   128/69 84 20 98.8 °F (37.1 °C) 99 %      MAP       --         Physical Exam    Nursing note and vitals reviewed.      Gen: AxOx3, well nourished, appears stated age, no pallor, no jaundice, appears mildly dehydrated  Eye: EOMI, scleral icterus present, no periorbital edema or ecchymosis  Head: Normocephalic, atraumatic, no lesions, scalp appears normal  ENT: Neck supple, no stridor, no masses, no drooling or voice changes  CVS: All distal pulses intact with normal rate and rhythm, no JVD, normal S1/S2, no murmur  Pulm: Normal breath sounds, no  wheezes, rales or rhonchi, no increased work of breathing  Abd:  Peau d'orange appearing skin overlying abdomen.  No visible vasculature.  Distended abdomen shifting dullness.  Soft.  Nontender.  No costovertebral angle tenderness.   ZARIA:  Performed with nurse bedside.  No external lesions.  Normal tone.  No occult blood or melena on glove.  Hemoccult positive     Ext:  2+ pitting edema of bilateral lower limbs, no lesions, rashes, or deformity  Neuro: GCS15, moving all extremities, gait intact, face grossly symmetric  Psych: normal affect, cooperative, well groomed, makes good eye contact      ED Course   Procedures  Labs Reviewed   CBC W/ AUTO DIFFERENTIAL - Abnormal; Notable for the following components:       Result Value    WBC 3.78 (*)     RBC 2.35 (*)     Hemoglobin 8.5 (*)     Hematocrit 26.3 (*)      (*)     MCH 36.2 (*)     RDW 17.7 (*)     Platelets 109 (*)     All other components within normal limits    Narrative:     Release to patient->Immediate   COMPREHENSIVE METABOLIC PANEL - Abnormal; Notable for the following components:    Sodium 133 (*)     CO2 22 (*)     Calcium 8.2 (*)     Albumin 2.2 (*)     Total Bilirubin 5.0 (*)     Alkaline Phosphatase 50 (*)     AST 88 (*)     Anion Gap 6 (*)     All other components within normal limits    Narrative:     Release to patient->Immediate   URINALYSIS, REFLEX TO URINE CULTURE - Abnormal; Notable for the following components:    Appearance, UA Hazy (*)     Protein, UA Trace (*)     Bilirubin (UA) 1+ (*)     Leukocytes, UA Trace (*)     All other components within normal limits    Narrative:     Specimen Source->Urine   PROTIME-INR - Abnormal; Notable for the following components:    Prothrombin Time 19.8 (*)     INR 1.9 (*)     All other components within normal limits    Narrative:     Release to patient->Immediate   AMMONIA - Abnormal; Notable for the following components:    Ammonia 73 (*)     All other components within normal limits    Narrative:      Release to patient->Immediate   B-TYPE NATRIURETIC PEPTIDE - Abnormal; Notable for the following components:     (*)     All other components within normal limits    Narrative:     Release to patient->Immediate   URINALYSIS MICROSCOPIC - Abnormal; Notable for the following components:    RBC, UA 29 (*)     WBC, UA 23 (*)     Bacteria Moderate (*)     All other components within normal limits    Narrative:     Specimen Source->Urine   IRON AND TIBC - Abnormal; Notable for the following components:    Transferrin 154 (*)     TIBC 228 (*)     All other components within normal limits    Narrative:     Release to patient->Immediate  Add on Ferritin, Iron Panel and Direct Billirubin per Dr. Ricardo @   22:42 pm to order # [id number]   BILIRUBIN, DIRECT - Abnormal; Notable for the following components:    Bilirubin, Direct 2.6 (*)     All other components within normal limits    Narrative:     Release to patient->Immediate  Add on Ferritin, Iron Panel and Direct Billirubin per Dr. Ricardo @   22:42 pm to order # [id number]   CULTURE, URINE   CULTURE, AEROBIC  (SPECIFY SOURCE)   CULTURE, ANAEROBIC   GRAM STAIN   LIPASE    Narrative:     Release to patient->Immediate   HIV 1 / 2 ANTIBODY    Narrative:     Release to patient->Immediate   HEPATITIS C ANTIBODY    Narrative:     Release to patient->Immediate   VITAMIN B12   FOLATE   IRON AND TIBC   FERRITIN   BILIRUBIN, DIRECT   AFP TUMOR MARKER   FERRITIN    Narrative:     Release to patient->Immediate  Add on Ferritin, Iron Panel and Direct Billirubin per Dr. Ricardo @   22:42 pm to order # [id number]   PHOSPHATIDYLETHANOL (PETH)   ALBUMIN, PERITONEAL, PLEURAL FLUID OR VALERIA DRAINAGE, IN-HOUSE   PROTEIN, PERITONEAL, PLEURAL FLUID OR VALERIA DRAINAGE, IN-HOUSE   LDH, PERITONEAL, PLEURAL FLUID OR VALERIA DRAINAGE, IN-HOUSE   WBC & DIFF, BODY FLUID   GLUCOSE, PERITONEAL, PLEURAL FLUID OR VALERIA DRAINAGE, IN-HOUSE   POCT URINE PREGNANCY          Imaging Results              X-Ray Chest  1 View (Final result)  Result time 09/25/23 22:07:32      Final result by Sophia Noyola MD (09/25/23 22:07:32)                   Impression:      No radiographic evidence of acute intrathoracic abnormality on this single hypoventilatory radiographic view of the chest.      Electronically signed by: Sophia Noyola MD  Date:    09/25/2023  Time:    22:07               Narrative:    EXAMINATION:  XR CHEST 1 VIEW    CLINICAL HISTORY:  Shortness of breath    TECHNIQUE:  Single frontal view of the chest was performed.    COMPARISON:  09/06/2021    FINDINGS:  Cardiac monitoring leads overlie the chest.  The cardiomediastinal silhouette is within normal limits of size and configuration.  Mediastinal structures are midline.  There are low lung volumes with subsequent accentuation of pulmonary bronchovascular markings.  No large region of confluent airspace consolidation, substantial volume of pleural fluid or pneumothorax is identified.  Increased attenuation of the lower lung zones presumably relates to overlying soft tissue.  Incidentally visualized right nipple piercing.  Osseous structures are intact.                                       Medications   cefTRIAXone (ROCEPHIN) 2 g in dextrose 5 % in water (D5W) 100 mL IVPB (MB+) (has no administration in time range)   lactulose 20 gram/30 mL solution Soln 15 g (has no administration in time range)   albumin human 25% bottle 12.5 g (has no administration in time range)   pantoprazole injection 40 mg (has no administration in time range)     Medical Decision Making  Initial assessment  38-year-old female with recently diagnosed cirrhosis now presenting with worsening shortness of breath, abdominal swelling and gastrointestinal bleeding,. Patient is able to vocalise, breathing spontaneously, hemodynamically stable, oriented, moving all 4 limbs spontaneously.  Examination consistent with large abdomen with skin changes, scleral icterus, and Hemoccult-positive rectal  examination.      Differential diagnosis  Decompensated cirrhosis  Considered hepatorenal syndrome  Gastrointestinal bleeding and considered esophageal varices  Low suspicion for SBP  Doubt pneumonia      ED management  Patient had mild hypotension on presentation and examination was consistent with cirrhotic liver changes including ascites.  CBC consistent with hemoglobin of 8.5, and without previous CBC is it is difficult to say if patient is losing blood.  CMP consistent deranged liver function but not in fulminant liver failure.  Lipase negative.  Pregnancy negative.  Given symptomatic ascites patient will require therapeutic paracentesis.  Patient will additionally require hemoglobin trending for possible gastrointestinal bleeding, though I do not think patient has large volume blood loss.  Patient remained stable in the emergency department and was admitted to Hospital Medicine.      Amount and/or Complexity of Data Reviewed  Labs:  Decision-making details documented in ED Course.    Risk  Decision regarding hospitalization.               ED Course as of 09/26/23 0025   Mon Sep 25, 2023   2157 WBC(!): 3.78 [PM]   2158 Hemoglobin(!): 8.5 [PM]   2158 Platelets(!): 109 [PM]   2158 Sodium(!): 133 [PM]   2158 Potassium: 4.3 [PM]   2158 BUN: 11 [PM]   2158 Creatinine: 0.9 [PM]   2158 Albumin(!): 2.2 [PM]   2158 BILIRUBIN TOTAL(!): 5.0 [PM]   2158 Alkaline Phosphatase(!): 50 [PM]   2158 AST(!): 88 [PM]   2158 Urinalysis Microscopic(!)  Bacteria observed but multiple squamous cells also observed. [PM]   2158 INR(!): 1.9 [PM]   2158 BNP(!): 102 [PM]   2158 Lipase: 50 [PM]   2158 Preg Test, Ur: Negative [PM]      ED Course User Index  [PM] Nicky Espinoza MD                    Clinical Impression:   Final diagnoses:  [R06.02] Shortness of breath  [R18.8] Ascites        ED Disposition Condition    Admit Stable                Nicky Espinoza MD  Resident  09/26/23 0025

## 2023-09-26 NOTE — ED TRIAGE NOTES
Litzy Tianecca Lima, a 38 y.o. female presents to the ED w/ complaint of sent by MD after surgery follow up. Pt instructed to come to ED to be evaluated for excessive abdominal fluid.    Triage note:  Chief Complaint   Patient presents with    MD referral     Pt was seen today for follow-up after recent dx of cirrhosis. Pt states MD referred her to ED for evaluation due to abdominal swelling.      Review of patient's allergies indicates:  No Known Allergies  No past medical history on file.

## 2023-09-26 NOTE — ASSESSMENT & PLAN NOTE
Likely secondary to cirrhosis.  Reports active hematochezia, however this is chronic and she is not hemodynamically unstable.  We will monitor.

## 2023-09-26 NOTE — CONSULTS
[facility] - Telemetry Stepdown  Hepatology  Consult Note    Patient Name: Litzy Amato  MRN: [id number]  Admission Date: 9/25/2023  Hospital Length of Stay: 1 days  Attending Provider: Lisa Grimes MD   Primary Care Physician: No, Primary Doctor  Principal Problem:Decompensated hepatic cirrhosis    Inpatient consult to Hepatology  Consult performed by: Ankita Sosa MD  Consult ordered by: Tee Ricardo MD        Subjective:     Transplant status: No    HPI:  Ms. Litzy Amato is a 38 year old female for whom hepatology is consulted for management of decompensated cirrhosis. She has a PMH significant for ETOH cirrhosis (diagnosed in approximately 2021 although she was not told she has cirrhosis until this year; associated with ascites and esophageal varices). She has a PSH significant for obesity (status post gastric sleeve in 2013). No other abdominal procedures in the past. She currently vapes, previous cigarette use was only while intoxicated. No other drug use.    Her heavy drinking started in 2019/2020 due to stress from work associated with traveling during covid with minimal exposure to friends/family. She checked herself into rehab in May of 2022 for 30 days and was sober until November of that year. She has had 6+ close friends/family deaths in the last few years including her  and AA sponsor which have pushed her back into alcoholism. Her last drink was 9/9. She currently lives with her parents who are both pastors. She is unemployed at this time and has no children.    On chart review, patient noted to have history of ETOH cirrhosis since at least 01/2021 with documented admission at [facility] in [place] for GI bleeding (hematemesis) attributed to portal hypertensive gastropathy seen on EGD with cirrhotic appearing liver and splenomegaly seen on US. During that admission, patient reported history of consuming vodka heavily since approximately 2020 with associated  80 pounds of unintentional weight loss; reported to be counseled on need for ETOH cessation during that admission. She was discharged with referral to hepatology in [facility]; however unclear if this was scheduled. She is noted to have ED evaluation in 04/2022 for RUQ pain associated with recurrent binge drinking; she was given IVF and PPI.     She was recently admitted to [facility] from 9/9/2023 to 9/16/2023 for evaluation of progressive abdominal distension; records from this admission not available for review. She reports undergoing paracentesis and being given antibiotics for SBP. She was noted to have anemia and reports undergoing and EGD on 9/12 and being told she had non-bleeding esophageal varices. She was discharged on Nadolol with PCP follow-up. Following discharge, she reports recurrent abdominal distension prompting a visit with her PCP who suggested she come here for evaluation on 9/25.     She has gained 70 pounds of water weight over the last 9 months causing significant limitations in mobility and back pain.     Hospital Course:   On arrival, vital signs normal on room air. Labs notable for macrocytic anemia (Hgb 8.5) with normal B12/folate, thrombocytopenia (109), elevated INR (1.9), normal Cr, and hyperbilirubinemia (5). She was admitted to hospital medicine and hepatology consulted.        Review of Systems   Constitutional:  Positive for activity change and unexpected weight change. Negative for chills and fever.   HENT:  Negative for congestion.    Eyes:  Negative for visual disturbance.   Respiratory:  Negative for cough and shortness of breath.    Cardiovascular:  Positive for leg swelling. Negative for chest pain and palpitations.   Gastrointestinal:  Positive for abdominal distention, abdominal pain, anal bleeding and blood in stool. Negative for constipation, diarrhea and vomiting.   Genitourinary:  Negative for difficulty urinating and dysuria.   Musculoskeletal:   Positive for back pain.   Skin:  Positive for color change.   Neurological:  Positive for weakness. Negative for tremors.   Psychiatric/Behavioral:  Negative for behavioral problems and confusion.        Past Medical History:   Diagnosis Date    Unspecified cirrhosis of liver        History reviewed. No pertinent surgical history.    Family history of liver disease: No    Review of patient's allergies indicates:  No Known Allergies      Tobacco Use    Smoking status: Not on file    Smokeless tobacco: Not on file   Substance and Sexual Activity    Alcohol use: Not on file    Drug use: Not on file    Sexual activity: Not on file       Medications Prior to Admission   Medication Sig Dispense Refill Last Dose    acetaminophen (TYLENOL) 650 MG TbSR Take 650 mg by mouth every 8 (eight) hours as needed.       chlordiazepoxide (LIBRIUM) 25 MG Cap Take 25 mg by mouth 3 (three) times daily as needed.       folic acid (FOLVITE) 1 MG tablet Take 1 tablet by mouth once daily.       lactulose (CHRONULAC) 10 gram/15 mL solution Take 10 g by mouth.       pantoprazole (PROTONIX) 40 MG tablet Take 1 tablet (40 mg total) by mouth once daily. 30 tablet 0     sulfamethoxazole-trimethoprim 800-160mg (BACTRIM DS) 800-160 mg Tab Take 160 mg by mouth.          Objective:     Vital Signs (Most Recent):  Temp: 98.2 °F (36.8 °C) (09/26/23 0747)  Pulse: 76 (09/26/23 0747)  Resp: 18 (09/26/23 0747)  BP: (!) 101/53 (09/26/23 0747)  SpO2: (!) 93 % (09/26/23 0747) Vital Signs (24h Range):  Temp:  [97.9 °F (36.6 °C)-98.8 °F (37.1 °C)] 98.2 °F (36.8 °C)  Pulse:  [68-84] 76  Resp:  [15-20] 18  SpO2:  [93 %-100 %] 93 %  BP: (101-128)/(52-69) 101/53     Weight: 128.2 kg (282 lb 10.1 oz) (09/26/23 0026)  Body mass index is 40.55 kg/m².       Physical Exam  Vitals reviewed.   Constitutional:       General: She is not in acute distress.     Appearance: She is obese. She is not toxic-appearing.   HENT:      Head: Normocephalic and atraumatic.   Eyes:       General: Scleral icterus present.      Extraocular Movements: Extraocular movements intact.   Cardiovascular:      Rate and Rhythm: Normal rate and regular rhythm.      Heart sounds: Normal heart sounds.   Pulmonary:      Effort: No respiratory distress.      Breath sounds: Normal breath sounds.   Abdominal:      General: There is distension.      Tenderness: There is abdominal tenderness. There is no guarding or rebound.   Musculoskeletal:         General: Swelling present.      Cervical back: Neck supple.      Right lower leg: Edema present.      Left lower leg: Edema present.      Comments: Dependent 2-3+ pitting edema abdomen, legs   Skin:     General: Skin is warm and dry.      Coloration: Skin is jaundiced.      Findings: Bruising present. Angiomas present on the chest.  Neurological:      General: No focal deficit present.      Mental Status: She is alert and oriented to person, place, and time.      Comments: No asterixis   Psychiatric:         Mood and Affect: Mood normal.         Behavior: Behavior normal.         Thought Content: Thought content normal.         Judgment: Judgment normal.            MELD 3.0: 24 at 9/26/2023  3:46 AM  MELD-Na: 21 at 9/26/2023  3:46 AM  Calculated from:  Serum Creatinine: 0.9 mg/dL (Using min of 1 mg/dL) at 9/26/2023  3:45 AM  Serum Sodium: 136 mmol/L at 9/26/2023  3:45 AM  Total Bilirubin: 3.6 mg/dL at 9/26/2023  3:45 AM  Serum Albumin: 1.8 g/dL at 9/26/2023  3:45 AM  INR(ratio): 2.2 at 9/26/2023  3:46 AM  Age at listing (hypothetical): 38 years  Sex: Female at 9/26/2023  3:46 AM      Significant Labs:  CBC:   Recent Labs   Lab 09/26/23 0346   WBC 2.53*   RBC 2.04*   HGB 7.5*   HCT 22.7*   PLT 73*     CMP:   Recent Labs   Lab 09/26/23 0345   GLU 99   CALCIUM 7.8*   ALBUMIN 1.8*   PROT 5.8*      K 3.6   CO2 22*      BUN 11   CREATININE 0.9   ALKPHOS 44*   ALT 13   AST 46*   BILITOT 3.6*     Coagulation:   Recent Labs   Lab 09/26/23 0346   INR 2.2*        Significant Imaging:  Labs: Reviewed      Assessment/Plan:     GI  * Decompensated hepatic cirrhosis  38 year old woman with decompensated alcoholic cirrhosis due to ascites. She has known esophageal varices and significant psychosocial history associated with her alcohol use. Good support system at home. Meld 3.0 of 25 on admission, 24 today with albumin 1.8. Anemia workup with macrocytic anemia, normal iron, B9, B12. Only previous abdominal surgery is gastric sleeve in 2013. Has attended rehab and been associated with AA in the past. She has good insight to her current situation.    Recommendations:  - Hepatitis panel  - Peth  - Autoimmune labs: Anti-mitochondrial, anti-SM, a1 antitrypsin, ceruloplasmin, IgG  - Liver US with doppler  - Diagnostic/therapeutic paracentesis  - BCx2  - CXR  - Addiction psych consult  - Albumin 25g TID for 2 days      Thank you for your consult. I will follow-up with patient. Please contact us if you have any additional questions.    Ankita Sosa MD  Hepatology  [facility] - Telemetry Stepdown

## 2023-09-26 NOTE — H&P
Inpatient Radiology Pre-procedure Note    History of Present Illness:  Litzy Amato is a 38 y.o. female who presents for US guided paracentesis.    Admission H&P reviewed.  Past Medical History:   Diagnosis Date    Unspecified cirrhosis of liver      History reviewed. No pertinent surgical history.    Review of Systems:   As documented in primary team H&P    Home Meds:   Prior to Admission medications    Medication Sig Start Date End Date Taking? Authorizing Provider   acetaminophen (TYLENOL) 650 MG TbSR Take 650 mg by mouth every 8 (eight) hours as needed.    Provider, Historical   chlordiazepoxide (LIBRIUM) 25 MG Cap Take 25 mg by mouth 3 (three) times daily as needed.    Provider, Historical   folic acid (FOLVITE) 1 MG tablet Take 1 tablet by mouth once daily. 1/30/21 1/30/22  Provider, Historical   lactulose (CHRONULAC) 10 gram/15 mL solution Take 10 g by mouth. 1/8/20   Provider, Historical   pantoprazole (PROTONIX) 40 MG tablet Take 1 tablet (40 mg total) by mouth once daily. 9/6/21 10/6/21  Simón Costa MD   sulfamethoxazole-trimethoprim 800-160mg (BACTRIM DS) 800-160 mg Tab Take 160 mg by mouth. 1/8/20   Provider, Historical     Scheduled Meds:    albumin human 25%  12.5 g Intravenous Once    cefTRIAXone (ROCEPHIN) IVPB  2 g Intravenous Q24H    lactulose  15 g Oral TID    pantoprazole  40 mg Intravenous BID     Continuous Infusions:   PRN Meds:aluminum-magnesium hydroxide-simethicone, melatonin, methocarbamoL, naloxone, ondansetron, polyethylene glycol, simethicone, sodium chloride 0.9%, traMADoL  Anticoagulants/Antiplatelets: no anticoagulation    Allergies: Review of patient's allergies indicates:  No Known Allergies  Sedation Hx: have not been any systemic reactions    Vitals:  Temp: 98.2 °F (36.8 °C) (09/26/23 0747)  Pulse: 71 (09/26/23 1026)  Resp: (!) 25 (09/26/23 1026)  BP: (!) 89/52 (09/26/23 1026)  SpO2: (!) 93 % (09/26/23 1026)     Physical Exam:  ASA: 3  Mallampati: n/a    General: no  acute distress  Mental Status: alert and oriented to person, place and time  HEENT: normocephalic, atraumatic  Chest: unlabored breathing  Heart: regular heart rate  Abdomen: distended  Extremity: moves all extremities    Plan: US guided paracentesis.     Sedation Plan: [facility] WALDEMAR Spence  Interventional Radiology  [phone]

## 2023-09-26 NOTE — SUBJECTIVE & OBJECTIVE
Past Medical History:   Diagnosis Date    Unspecified cirrhosis of liver        History reviewed. No pertinent surgical history.    Review of patient's allergies indicates:  No Known Allergies    No current facility-administered medications on file prior to encounter.     Current Outpatient Medications on File Prior to Encounter   Medication Sig    acetaminophen (TYLENOL) 650 MG TbSR Take 650 mg by mouth every 8 (eight) hours as needed.    chlordiazepoxide (LIBRIUM) 25 MG Cap Take 25 mg by mouth 3 (three) times daily as needed.    folic acid (FOLVITE) 1 MG tablet Take 1 tablet by mouth once daily.    lactulose (CHRONULAC) 10 gram/15 mL solution Take 10 g by mouth.    pantoprazole (PROTONIX) 40 MG tablet Take 1 tablet (40 mg total) by mouth once daily.    sulfamethoxazole-trimethoprim 800-160mg (BACTRIM DS) 800-160 mg Tab Take 160 mg by mouth.     Family History    None       Tobacco Use    Smoking status: Not on file    Smokeless tobacco: Not on file   Substance and Sexual Activity    Alcohol use: Not on file    Drug use: Not on file    Sexual activity: Not on file     Review of Systems   Gastrointestinal:  Positive for abdominal distention, abdominal pain and blood in stool.   All other systems reviewed and are negative.    Objective:     Vital Signs (Most Recent):  Temp: 98.2 °F (36.8 °C) (09/26/23 0018)  Pulse: 68 (09/26/23 0018)  Resp: 16 (09/26/23 0018)  BP: (!) 106/58 (09/26/23 0018)  SpO2: 98 % (09/26/23 0018) Vital Signs (24h Range):  Temp:  [98.2 °F (36.8 °C)-98.8 °F (37.1 °C)] 98.2 °F (36.8 °C)  Pulse:  [68-84] 68  Resp:  [15-20] 16  SpO2:  [98 %-100 %] 98 %  BP: (103-128)/(55-69) 106/58     Weight: 128.2 kg (282 lb 10.1 oz)  Body mass index is 40.55 kg/m².     Physical Exam  Vitals and nursing note reviewed.   Constitutional:       General: She is not in acute distress.     Appearance: She is well-developed. She is not diaphoretic.   HENT:      Head: Normocephalic and atraumatic.   Eyes:      General:  Scleral icterus present.      Conjunctiva/sclera: Conjunctivae normal.   Neck:      Vascular: No JVD.   Cardiovascular:      Rate and Rhythm: Normal rate and regular rhythm.   Pulmonary:      Effort: Pulmonary effort is normal. No respiratory distress.      Breath sounds: No wheezing.   Abdominal:      General: There is distension.      Tenderness: There is no guarding.      Comments: Notably distended abdomen with fluid wave   Musculoskeletal:      Right lower leg: Edema present.      Left lower leg: Edema present.   Skin:     Coloration: Skin is jaundiced. Skin is not pale.      Findings: Bruising present.   Neurological:      Mental Status: She is alert and oriented to person, place, and time.      Motor: No abnormal muscle tone.   Psychiatric:         Mood and Affect: Mood normal.         Behavior: Behavior normal.              Severe; acute decompensated cirrhosis  Significant Labs: All pertinent labs within the past 24 hours have been reviewed.  BMP:   Recent Labs   Lab 09/25/23 2109   GLU 94   *   K 4.3      CO2 22*   BUN 11   CREATININE 0.9   CALCIUM 8.2*     CBC:   Recent Labs   Lab 09/25/23 2109   WBC 3.78*   HGB 8.5*   HCT 26.3*   *       Significant Imaging: I have reviewed all pertinent imaging results/findings within the past 24 hours.    Decision made to admit

## 2023-09-26 NOTE — ASSESSMENT & PLAN NOTE
38 year old woman with decompensated alcoholic cirrhosis due to ascites. She has known esophageal varices and significant psychosocial history associated with her alcohol use. Good support system at home. Meld 3.0 of 25 on admission, 24 today with albumin 1.8. Anemia workup with macrocytic anemia, normal iron, B9, B12. Only previous abdominal surgery is gastric sleeve in 2013. Has attended rehab and been associated with AA in the past. She has good insight to her current situation.    Recommendations:  - Hepatitis panel  - Peth  - Autoimmune labs: Anti-mitochondrial, anti-SM, a1 antitrypsin, ceruloplasmin, IgG  - Liver US with doppler  - Diagnostic/therapeutic paracentesis  - BCx2  - CXR  - Addiction psych consult  - Albumin 25g TID for 2 days

## 2023-09-26 NOTE — PLAN OF CARE
Problem: Adult Inpatient Plan of Care  Goal: Plan of Care Review  Outcome: Ongoing, Progressing  Goal: Optimal Comfort and Wellbeing  Outcome: Ongoing, Progressing     Problem: Bariatric Environmental Safety  Goal: Safety Maintained with Care  Outcome: Ongoing, Progressing       Plan of care reviewed with pt. Pt aaox4. Pt went for paracentesis today, fluids sent off.  Pt went to a liver u/s also. Blood cultures done x2. Possible liver biopsy later this week,  Pt remained free of falls, trauma, and injury. No other concerns at this time.

## 2023-09-26 NOTE — CONSULTS
IR consulted for a paracentesis. Primary team to place orders for a paracentesis and any labs to send off.     Magalie Waldrop PA-C  Interventional Radiology  Spectra: [phone]  9/26/2023

## 2023-09-26 NOTE — PROCEDURES
Radiology Post-Procedure Note    Pre Op Diagnosis: Ascites  Post Op Diagnosis: Same    Procedure: Ultrasound Guided Paracentesis    Procedure performed by: Lakshmi Spence PA-C    Written Informed Consent Obtained: Yes  Specimen Removed: YES (tea colored)  Estimated Blood Loss: Minimal    Findings:   Successful paracentesis.  Albumin administered PRN per protocol.    Patient tolerated procedure well.    Lakshmi Spence PA-C  Interventional Radiology  [phone]

## 2023-09-26 NOTE — ASSESSMENT & PLAN NOTE
Patient reports priscilla hematochezia and intermittent melena with each bowel movement over the past couple of weeks.  Recently required blood transfusion at outside hospital.  Currently without indication for transfusion here, however will monitor.  Given reported esophageal varices, we will start b.i.d. PPI.  Appreciate hepatology recommendations.

## 2023-09-26 NOTE — PLAN OF CARE
Pt transported via stretcher w/transportation, pt c/o coldness, safety/comfort measures implemented

## 2023-09-26 NOTE — H&P
[facility] - [facility] Medicine  History & Physical    Patient Name: Litzy Amato  MRN: [id number]  Patient Class: IP- Inpatient  Admission Date: 9/25/2023  Attending Physician: Lisa Grimes MD   Primary Care Provider: Denise Primary Doctor         Patient information was obtained from patient and ER records.     Subjective:     Principal Problem:Decompensated hepatic cirrhosis    Chief Complaint:   Chief Complaint   Patient presents with    MD referral     Pt was seen today for follow-up after recent dx of cirrhosis. Pt states MD referred her to ED for evaluation due to abdominal swelling.         HPI: Litzy Amato is a 38 y.o. female with history of alcohol abuse and recently diagnosed alcoholic cirrhosis who presents today with abdominal distention    She provides a surprisingly thorough history.  She reports that on September night, she was admitted at [facility] where she was diagnosed with alcoholic cirrhosis.  Underwent large volume paracentesis, received albumin, and received 2 units RBC transfusion for anemia.  She was started on antibiotics for SBP She states that she underwent EGD, which showed esophageal varices but were not bleeding.  She was placed on nadolol, Protonix, and Macrobid?, with which she reports compliance.  She was discharged after a week, and outpatient GI appointments were made.  Unfortunately, she states that her abdominal distention quickly recurred, and she was evaluated by her PCP who noted her to be hypotensive and appearing unwell in clinic, therefore she was referred to the ED. currently, she complains of abdominal pain due to distention.  She has recently had hematochezia with intermittent melena with each bowel movement.  She does report generalized weakness and fatigue but denies any fever, nausea, or vomiting.  She is had no hematemesis.  She denies any OTC med use at this time.  She reports that her last drink of alcohol was  September 1st.      Past Medical History:   Diagnosis Date    Unspecified cirrhosis of liver        History reviewed. No pertinent surgical history.    Review of patient's allergies indicates:  No Known Allergies    No current facility-administered medications on file prior to encounter.     Current Outpatient Medications on File Prior to Encounter   Medication Sig    acetaminophen (TYLENOL) 650 MG TbSR Take 650 mg by mouth every 8 (eight) hours as needed.    chlordiazepoxide (LIBRIUM) 25 MG Cap Take 25 mg by mouth 3 (three) times daily as needed.    folic acid (FOLVITE) 1 MG tablet Take 1 tablet by mouth once daily.    lactulose (CHRONULAC) 10 gram/15 mL solution Take 10 g by mouth.    pantoprazole (PROTONIX) 40 MG tablet Take 1 tablet (40 mg total) by mouth once daily.    sulfamethoxazole-trimethoprim 800-160mg (BACTRIM DS) 800-160 mg Tab Take 160 mg by mouth.     Family History    None       Tobacco Use    Smoking status: Not on file    Smokeless tobacco: Not on file   Substance and Sexual Activity    Alcohol use: Not on file    Drug use: Not on file    Sexual activity: Not on file     Review of Systems   Gastrointestinal:  Positive for abdominal distention, abdominal pain and blood in stool.   All other systems reviewed and are negative.    Objective:     Vital Signs (Most Recent):  Temp: 98.2 °F (36.8 °C) (09/26/23 0018)  Pulse: 68 (09/26/23 0018)  Resp: 16 (09/26/23 0018)  BP: (!) 106/58 (09/26/23 0018)  SpO2: 98 % (09/26/23 0018) Vital Signs (24h Range):  Temp:  [98.2 °F (36.8 °C)-98.8 °F (37.1 °C)] 98.2 °F (36.8 °C)  Pulse:  [68-84] 68  Resp:  [15-20] 16  SpO2:  [98 %-100 %] 98 %  BP: (103-128)/(55-69) 106/58     Weight: 128.2 kg (282 lb 10.1 oz)  Body mass index is 40.55 kg/m².     Physical Exam  Vitals and nursing note reviewed.   Constitutional:       General: She is not in acute distress.     Appearance: She is well-developed. She is not diaphoretic.   HENT:      Head: Normocephalic and  atraumatic.   Eyes:      General: Scleral icterus present.      Conjunctiva/sclera: Conjunctivae normal.   Neck:      Vascular: No JVD.   Cardiovascular:      Rate and Rhythm: Normal rate and regular rhythm.   Pulmonary:      Effort: Pulmonary effort is normal. No respiratory distress.      Breath sounds: No wheezing.   Abdominal:      General: There is distension.      Tenderness: There is no guarding.      Comments: Notably distended abdomen with fluid wave   Musculoskeletal:      Right lower leg: Edema present.      Left lower leg: Edema present.   Skin:     Coloration: Skin is jaundiced. Skin is not pale.      Findings: Bruising present.   Neurological:      Mental Status: She is alert and oriented to person, place, and time.      Motor: No abnormal muscle tone.   Psychiatric:         Mood and Affect: Mood normal.         Behavior: Behavior normal.              Severe; acute decompensated cirrhosis  Significant Labs: All pertinent labs within the past 24 hours have been reviewed.  BMP:   Recent Labs   Lab 09/25/23 2109   GLU 94   *   K 4.3      CO2 22*   BUN 11   CREATININE 0.9   CALCIUM 8.2*     CBC:   Recent Labs   Lab 09/25/23 2109   WBC 3.78*   HGB 8.5*   HCT 26.3*   *       Significant Imaging: I have reviewed all pertinent imaging results/findings within the past 24 hours.    Decision made to admit    Assessment/Plan:     * Decompensated hepatic cirrhosis  Patient presents with increased ascites and generalized malaise.  Recently diagnosed with likely alcoholic cirrhosis, and had a prolonged admission requiring large volume paracentesis, EGD which showed esophageal varices, and treatment for SBP per patient's report.  MELD-NA 21. We will consult IR for diagnostic and therapeutic paracentesis.  Start ceftriaxone for empiric SBP coverage.  AFP ordered.  Consult hepatology for further recommendations.      Alcohol abuse  Patient reports heavy alcohol abuse for at least the past 3 years,  with her last drink September 1st.  Would advise continued cessation.      Hyponatremia  Mild, and asymptomatic.  Likely secondary to liver disease.  Will monitor.    Hyperbilirubinemia  Likely due to cirrhosis versus infection.  Appreciate GI recommendations.      Acute on chronic blood loss anemia  Patient reports priscilla hematochezia and intermittent melena with each bowel movement over the past couple of weeks.  Recently required blood transfusion at outside hospital.  Currently without indication for transfusion here, however will monitor.  Given reported esophageal varices, we will start b.i.d. PPI.  Appreciate hepatology recommendations.      Thrombocytopenia  Likely secondary to cirrhosis.  Reports active hematochezia, however this is chronic and she is not hemodynamically unstable.  We will monitor.    Ascites due to alcoholic cirrhosis  As above, we will consult IR for diagnostic and therapeutic paracentesis.      VTE Risk Mitigation (From admission, onward)         Ordered     Reason for No Pharmacological VTE Prophylaxis  Once        Question Answer Comment   Reasons: Active Bleeding    Reasons: Risk of Bleeding        09/26/23 0056     IP VTE HIGH RISK PATIENT  Once         09/26/23 0056     Place sequential compression device  Until discontinued         09/26/23 0056                 Patient is full code on discussion with her.  Patient's surrogate decision maker is her sister, Heaven.          Tee Ricardo MD  Department of Hospital Medicine  Ender Pike - Telemetry Stepdown

## 2023-09-26 NOTE — DISCHARGE INSTRUCTIONS
REFERRAL RECOMMENDATIONS FOR ALCOHOL USE DISORDER      12 STEP PROGRAMS (and similar):     Alcoholics Anonymous (local)  [x] [phone]  [x] www.[place].org for schedules for in-person and online meetings  [x] There are AA meetings throughout the day all over town  [x] AA costs nothing to attend; they pass a basket for donations but this is not required    Alcoholics Anonymous Online Intergroup (national)  [x] www.aa-intergroup.org  [x] Good resource for large, nation-wide meetings  [x] Can also attend smaller, local meetings in other cities  [x] Countless meetings all day and all night  [x] AA costs nothing to attend; they pass a basket for donations but this is not required    Flying Sober - 24/7 zoom meetings for women and coed - sign on anytime, anywhere!  https://WorkfacesobDonde/02-8-skwpuocf/    Online Intergroup of AA - 121 Open AA [place] Meeting - 24/7 zoom meetings  https://aa-intergroup.org/meetings/    LOOKING FOR AN ALTERNATIVE TO 12 STEP PROGRAMS - check out:  SMART Recovery: https://www.smartrecovery.org/about-us  [facility]: https://recoverydharma.org      DETOX UNITS (USUALLY 5-7 DAYS):     [facility]: [address], [place]  [phone], call first to ensure bed availability    [facility]: [address] [place]  [phone], Option 1, call first to ensure bed availability    [place] [facility] Recovery Center: [facility] [place] [address], [place]  [phone] (intake by appointment only)    [facility]: [address], [place]  [phone]      INTENSIVE OUTPATIENT PROGRAMS:     [facility] (formerly known as the ABU)  [x] [phone], Option 2  [x] [address] 4th Floor, [place] [address]  [x] https://www.ochsner.org/services/[facility]sner-recovery-program  [x] [facility] delivers comprehensive and collaborative treatment for alcohol and substance use disorders.  Excellent program for working professionals or  anyone else seeking recovery.  [x] Requires insurance approval prior to starting program, call number above for more information.  [x] Intensive Outpatient Rehabilitation Program - M-F 9am-3pm - daily groups with psychologists and social workers, sessions with MDs 3x per week   [x] Ambulatory detox and dual diagnosis available    [facility] Intensive Outpatient Program  [x] [phone]  [x] [address] [facility] (the clinic not on [facility] main [facility])  [x] Call number above for more info and to check insurance requirements    [facility]  [address] [place], [place] [address] [phone]    [facility]:  [address]?, [place], [place] [address]?, [phone]  [address] [facility]?, [place], [place] [address]?, [phone]    RESIDENTIAL REHABS (USUALLY 28 DAYS):     [facility]: [address], [phone]    [facility]: [address] , [facility]  [phone] (intake by appointment only)    [facility] (men only) [address] [facility], [phone]    [facility] (Female only) [address] [facility], [phone]    [facility]: [address], [facility], men's program [phone], women's program [phone]    [facility]: [address], [facility], [phone]    [facility]: [address] [place], [place], [phone]    [facility]: Men only, [phone], [address] [place] [facility]: [address], [place], [place], [phone]    [facility]: [facility] [place], [place],  [phone]  New Location: [address] [place] [address] [address], [place] [address], [phone]    [facility]:   ?[address]?[place], [place] [address]?[phone]    [place]: [address], [place] [address], [phone]    [place]: [place], [phone]     [facility]: [place], [place], [phone]    [facility]: [address]  JEREMIE Oliv[address], [phone]    [facility]: [place], [place], [phone]    [place]: [place], [place], [phone]    [facility] [place]: [address], [place] [address], [phone]    COMMUNITY ADDICTION CLINICS:     [facility]: [address] [facility] [place], [phone] -or- [address] [place] [address]    [facility] [place]: [address] [facility], [place]  [address]     [facility]: Clinics [phone]; Crisis [phone]    [facility]: [address] [place] [address]    [facility]: [address] [place] [address]    [facility]: [address] [place] [address],    [facility]: 2nd Floor [address] [place] [address]    [facility]: [address] [facility] [place] [address]    [facility], [address] [place] [address]    [facility]: [address] [place] [address], [place] [phone]  (serves youth 16-23 years old)    [facility]: [facility]/[place] [phone]    [facility]: [address] [facility], [place] [phone]    [facility]: [address], [place] [phone]    [facility]: [address] [facility], [address], [phone]     [facility]: [address] [facility], [phone], [phone]    RESOURCES IN OTHER [facility]:     [facility]: [address] [place], [phone], [phone]    [facility]: [address] [facility] [address], [address], [phone]    [facility], [address] [place] [address], [phone]    [facility]: [address], [place], [phone]    [facility]  "[facility] Health, [address] [facility], [phone] ([facility])    [facility], [address] [facility], [phone] ([facility])    [facility], [address], [place], [phone] ([place], [place], [facility])    [facility], [address], [place], [phone] ([facility])    [facility] Hotline [phone], [phone]    [facility], [address] [facility], [facility], [address] [facility], [address], [place] [facility], [address] [facility] [facility], [facility] Behavioral [facility], [address] [facility] [address] [place] [facility], [address], [facility], [address], [address] [phone]    [facility], [address] [facility] [phone]    [facility], [address] [facility], [phone]    [facility] for [facility] Recovery, [address] [place] [address], [phone]      Mongolian SPEAKING (en español):     Información de la reunión de Alcohólicos Anónimos  [facility], 10:00 am  Habla español  Esta reunión está abierta y cualquiera puede asistir.    Turkmen speaking Alcoholics anonymous meetings:  El "Greg [place] AA Skype" es un greg on line de Alcohólicos Anónimos en español. El greg es basilio, gratuito y virtual a través de Skype Audio. El greg funciona mediante samantha llamada grupal de voz, por lo que no se utiliza la videollamada, ni se pueden nayeli las imágenes o rostros de los participantes. Hace yash años y medio abrimos el primer Greg de AA por [place] en [place], myriam actualmente asisten personas desde [place], [place], [place], [place], [place], [place], [place], [place], [place], [place], " [place] y [place], entre otros.    El aleena es muy útil para los alcohólicos que residen en lugares donde no se celebran reuniones de AA, o residen en lugares donde las reuniones de AA son un número limitado de días a la semana, o para aquellos compañeros que se hayan de viaje o que, por cualquier motivo, se hayan convalecientes y no pueden desplazarse. Todos los días nos reuniones a las 21:00 (hora española)    Podéis obtener más información sobre el aleena y jocelyne sesiones en la págAccurIC web [email or web address]/      MENTAL HEALTH/ADDICTIVE DISORDERS:     AA ([phone]), NA ([phone])   National Suicide Prevention Lifeline- Call 1-900.284.1088 Available 24 hours [facility] [phone]; Crisis Line [phone] - Call for options A-F:  Intensive Outpatient Treatment/ Day programs   [facility], please contact   [facility], please contact [phone] or [phone] to speak with an admissions counselor.  Behavioral Health Group (Methadone Maintenance)   [address] [facility] [address], [phone]  [facility] [place] [address] [phone]  [facility], [address] [address], [phone]  [facility] [phone]  [facility] [place] please contact [phone]  [facility], [facility], 4th floor [address] [place] [address] Phone: [phone]   Acer  [place]hawn Office: [address] [address], [phone]  [place] Office: [address] [facility] [address], [place] [address], [phone]  [place] Office: [address] [place] [place] [address] [phone]    Outpatient Substance Abuse Treatment   Behavioral Health Group (Methadone Maintenance)   [facility] [place] [address], [phone]  [address] [place] [address] [phone]  [facility], [address] [place] [address], [phone]  Acer  [place] Office: [address] [place] [address], [phone]  [place] Office: [address] [facility], [address], [place] [address], [phone]  [place] Office: [address] [place] [address] [phone]  [facility], [address] [place] [address] [phone]   [facility], [address] [facility], [address], [phone]  [facility], [address] [place], [place] [phone]    Residential Substance Abuse Treatment   [facility] [address] [facility], [phone] or [phone]  [facility], [address] [facility], [phone]  [facility] (men only) [address] [place] [address], [phone]  [facility] at the [facility] (women only) [address] [place] [address] [phone]  [facility], [address] [place] [address] [phone]  [facility] (women only), intakes at [address] [facility], [phone]  [facility] (7-day program, $100, [address], [phone], [phone], [phone])  [facility] (Men only, [phone]), [address], [place] (Vets*/Non-Vets)  [facility] (Men only, $400/month program fee) [address] [facility] Jon [place], [phone]  [facility] (Women over age 39 only), [address] [facility], [phone]    Out of Area:    [place], [address] [place] [address] 36, [place], [place] ([phone])  [facility] (men only), [address] [facility] [place] [address], [phone]  [facility], [address] [place], [place] ([phone])  [address] [place], [place] ([phone])  [facility], [address] [place] [address], [phone]  [facility] (Women only, has to have mental illness, can be homeless or substance abuser), [phone]      [place] RESOURCES:     [place] Mobile Mental Health Crisis Response  Team:    Region 12 ([place], [place], [place], and [facility]) ([facility])  [phone]      Outpatient Mental Health & Addiction Clinic Resources for both [facility]:    [facility] Mental Healthcare Resources  Website: www.[facility] Hospital[facility].org  Main Number: 743-971-3368    [facility] ([facility])  [address] ([address] [facility]) [place] [address]  [phone]    [facility] ([facility])  [address] ([address] [facility]) [place] [address]  [phone]    [facility]  [address] ([address]) [place] [address]  [phone]    [facility]  [address] ([address] [facility]) [place] [address]  [phone]      Addiction Treatment Resources for both [facility]:    [facility] (Detox, Residential, PHP, IOP, and Aftercare Programs)  [address] [place], [place] [address] [phone]    [facility] (Residential, IOP, Transitional Living, and Aftercare Programs)  [address] [place] [address]  [phone]    [facility] (Inpatient, Residential, Detox, IOP, Outpatient, and Aftercare Programs)  [address] [place] [address] [phone] or toll free at [phone]      Outpatient Mental Health Psychotherapy Resources for both [facility]:    Jade Villanueva, PEDROW  [address]  [place] [address]  [phone]  Specialties: Depression, Anxiety, and Life Transitions    Bhavya Shane, PhD  [address]  [place] [address]  [phone]  Specialties: Testing and Evaluation, Education and Learning Disabilities, and ADHD    Pau Wilkins, PEDROW [facility] [address] [place] [address]  [phone]  Specialties:  Obsessive-Compulsive (OCD), Depression, and Relationship Issues    Zenia Ramos LPC [address] [place] [address]  [phone]  Specialties: Trauma & PTSD, Mood Disorders, and Anxiety    Zenia Thurston, PhD, [facility] [address] [place] [address], [place] [address] [phone]  Specialties: Family Conflict, Child, and Relationship Issues    Latrice Pedraza LPC Counseling [place], [place] [address] [phone]  Specialties: Anxiety, Depression, and Anger Management        IN CASE OF SUICIDAL THINKING, call the National Suicide Hotline Number: 988    988 Suicide & Crisis Lifeline: 988 , 3-4907-812-004-TALK (0202)  Provides 24/7, free and confidential support for people in distress, prevention and crisis resources for you or your loved ones, and best practices for professionals.    Call, text or chat.  https://988Oneloudr Productionsline.org

## 2023-09-26 NOTE — ASSESSMENT & PLAN NOTE
Patient reports heavy alcohol abuse for at least the past 3 years, with her last drink September 1st.  Would advise continued cessation.

## 2023-09-26 NOTE — CONSULTS
"[id number]}        INITIAL VISIT: ADDICTION PSYCHIATRY CONSULTATION SERVICE      ASSESSMENT AND PLAN:     DIAGNOSES & PROBLEMS:  Alcohol use disorder       In Summary:  Ms Amato is a 37y/o F with PMHx alcohol use disorder, alcoholic liver cirrhosis (per chart diagnosed 2021, pt not aware until earlier this year), s/p gastric sleeve in 2013, presenting with abdominal distension. Pt was recently admitted to [facility] where she was diagnosed with alcoholic cirrhosis. She underwent paracentesis and received albumin and 2 u pRBC for anemia, and was started on SBP prophylaxis. EGD showed nonbleeding esophageal varices. Abdominal distension returned shortly after discharge. Pt reported last drink of alcohol on September 1, 2023. Addiction psychiatry consulted for "alcohol addiction in liver cirrhosis." Per primary team, not currently requiring liver transplant evaluation but this may be needed further in the patient's treatment trajectory per hepatology.      Plan:  - recommend rehab for alcohol use disorder, resources placed in discharge instructions  - no medications indicated or recommended at this time   - addiction psychiatry will sign off at this time, please reach out with further questions       With reasonable medical certainty, based on history, chart review, available collateral information, and a present-state examination:  - the patient does not currently meet the criteria for psychiatric hospitalization  - PEC is not indicated  - defer non-psychiatric medication(s) and management to the current provider(s) of record  - upon discharge, it is recommended to follow up with an outpatient provider within 1-2 weeks of discharge, but ideally as soon as possible  -- RECOMMENDATIONS STATUS POST DETOX: establish and maintain sobriety and a recovery lifestyle, complete a licensed accredited rehab program, and engage in 12 step (or equivalent) meetings and activities  - recommend patient enroll in addiction " rehab program after discharge and medical stabilization  - counseled on full abstinence from alcohol and substances of abuse (illicit and prescription)  - full engagement in 12 step (or equivalent) recovery program(s), including meeting attendance and acquisition/maintenance of sponsor  - relapse prevention and motivational interviewing provided  - provided resources for various addiction rehabilitation options as part of aftercare planning       PRESENTATION:     Litzy Amato presents with the following chief complaint: alcohol and/or drug addiction and decompensated hepatic cirrhosis.    Per Chart:  - 9/26/23, from primary team HPI, current admission:   She reports that on September night, she was admitted at [facility] where she was diagnosed with alcoholic cirrhosis.  Underwent large volume paracentesis, received albumin, and received 2 units RBC transfusion for anemia.  She was started on antibiotics for SBP She states that she underwent EGD, which showed esophageal varices but were not bleeding.  She was placed on nadolol, Protonix, and Macrobid?, with which she reports compliance.  She was discharged after a week, and outpatient GI appointments were made.  Unfortunately, she states that her abdominal distention quickly recurred, and she was evaluated by her PCP who noted her to be hypotensive and appearing unwell in clinic, therefore she was referred to the ED.   - 9/26/2023, from hepatology consult HPI, current admission:   Her heavy drinking started in 2019/2020 due to stress from work associated with traveling during covid with minimal exposure to friends/family. She checked herself into rehab in May of 2022 and was discharged in November of that year. She has had 6+ close friends/family deaths in the last few years including her  and AA sponsor which have pushed her back into alcoholism. Her last drink was 9/9. She currently lives with her parents who are both pastors. She is  unemployed at this time and has no children.     On chart review, patient noted to have history of ETOH cirrhosis since at least 01/2021 with documented admission at [facility] in [place] for GI bleeding (hematemesis) attributed to portal hypertensive gastropathy seen on EGD with cirrhotic appearing liver and splenomegaly seen on US. During that admission, patient reported history of consuming vodka heavily since approximately 2020 with associated 80 pounds of unintentional weight loss; reported to be counseled on need for ETOH cessation during that admission. She was discharged with referral to hepatology in [place] outpatient; however unclear if this was scheduled. She is noted to have ED evaluation in 04/2022 for RUQ pain associated with recurrent binge drinking; she was given IVF and PPI.   - 9/20/23, hepatology/transplant associate contact: hx gastric bypass 11 years ago, hx fatty liver. Heavy alcohol use x3 years. Hospitalized 3 times due to liver issues and recently stopped drinking. Went to local ER due to not feeling well, 6L paracentesis performed, but fluid returned shortly thereafter. Was admitted to  general 9/6-9/16. Last drink 8/31/2023.   - 4/24/2023, ED encounter for RUQ pain, +N/V/D. Had quit alcohol cold a few days prior. Daily vodka at the time. Discharged home with protonix, librium PRN, tylenol.  - 1/28/2021, admitted for GI hemorrhage, hematemesis with nausea. EGD found portal hypertensive gastropathy without varices or active bleeding. Resumed on Paxil 10mg for anxiety, depression. Discharged on tylenol, librium, bentyl, thiamine, paxil, vitamins.   - 12/24/2020, ED encounter for abdominal pain, alcoholic liver disease, alcohol use disorder, thrombocytopenia. Discharged home on librium, bentyl, protonix.     Per Patient:  Night Float Psych Resident:  Patient alert and fully oriented. Patient reports alcohol consumption for many years, worsening in 2019. Reports that at its peak she was  "consuming around two pints of vodka per day. Reports significant recent life stressors including deaths of family members, friends, and family dog. Reports living in [facility] but states that she is interested in attending rehab (in particular an IOP) upon discharge.     Addiction Psych Resident:  Reviewed pt's interview yesterday with night float resident. Pt seen/assessed this morning. She reports difficult procedure yesterday due to paracentesis. Some residual soreness. Confirms multiple recent stressors and current health issues have been exacerbated. Despite this, patient feels more "clarity" due to cessation of alcohol use in past weeks. (Reports last drink was 1st week of September). Interested in pursuing recovery s/p hospitalization though has some concerns due to physical weakness as well as lack of privacy in facilities near home due to multiple relatives and ppl who know her family. Last completed rehab in May 2022 but feels like she was moreso just interested in a "reset" but now understands severity of alcohol use and repurcussions. Also discussed MAT; some concerns due to interactions with current medications and previous adverse reactions (unsure, possibly with Naltrexone?). Has support of family and friends which she finds helpful; does report wanting to pursue abstinence now for herself. Discussed residential vs IOP but also pursuing facility that would have ability to monitor medical needs as she was told by primary that paracentesis/procedures may continue even after discharge. Denies any current cravings or withdrawal symptoms; denies any current SI, HI, or AVH.       REVIEW OF SYSTEMS:  I[]I Patient denies any pertinent ROS, and none is known.  I[]I Patient unable or unwilling to provide any ROS.  [x] Y  [] N  sleep disturbance: **    [] Y  [x] N  appetite/weight change: **    [x] Y  [] N  fatigue/anergia: **    [] Y  [x] N  impairment in focus/concentration: **      "   [] Y  [x] N  depression: **     [x] Y  [] N  anxiety/worry: **     [] Y  [x] N  dysregulated mood/behavior: **     [] Y  [x] N  manic symptomatology: **     [] Y  [x] N  psychosis: **         A pertinent medical review of systems was performed with the following notable findings:      CURRENT PSYCHOTROPIC REGIMEN:  I[]I Y  I[x]I N  I[]I U            ADDICTION:      I[]I Y  I[]I N  I[]I U  I[]I Current  I[x]I Former  Nicotine Use:   I[]I Y  I[]I N  I[]I U  I[x]I Current  I[]I Former  Alcohol Use:   I[]I Y  I[]I N  I[]I U  I[]I Current  I[x]I Former  Alcohol Misuse/Abuse:   I[]I Y  I[x]I N  I[]I U  I[]I Current  I[]I Former  Illicit Drug Use/Misuse/Abuse:   I[]I Y  I[x]I N  I[]I U  I[]I Current  I[]I Former  Misuse/Abuse of Rx Medications:   I[]I Cannabis  I[]I Cocaine  I[]I Heroin  I[]I Meth  I[]I Opioids  I[]I Stimulants  I[]I Benzos  I[]I Other:      I[]I N/A  I[]I U  Substance(s) of Choice: Alcohol  I[]I N/A  I[]I U  Substance(s) Used Currently/Recently: Alcohol   I[]I N/A  I[]I U  Alcohol Consumption: exceeds recommended healthy limits, daily or near daily, physiologically dependent   I[]I N/A  I[]I U  Last Drink: 9/9/23  I[]I N/A  I[]I U  Last Drug Use: n/a  I[]I N/A  I[]I U  Duration of Sobriety/Abstinence: 4 months in 2020     I[]I Y  I[x]I N  I[]I U  Hx of Detox:   I[x]I Y  I[]I N  I[]I U  Hx of Rehab:   I[]I Y  I[x]I N  I[]I U  Hx of IVDU:   I[]I Y  I[x]I N  I[]I U  Hx of Accidental Overdose:   I[]I Y  I[x]I N  I[]I U  Hx of DUI:   I[]I Y  I[x]I N  I[]I U  Hx of Complicated Withdrawal (i.e. Seizures and/or Delirium Tremens):   I[]I Y  I[]I N  I[]I U  Hx of Known/Suspected Substance-Induced Psychiatric Disorder:   I[]I Y  I[]I N  I[]I U  Hx of Medication Assisted Treatment:   I[]I Y  I[]I N  I[]I U  Hx of Twelve Step Program (or Equivalent) Involvement:   I[]I Y  I[]I N  I[]I U  Currently Exhibits Signs of Intoxication:   I[]I Y  I[]I N  I[]I U  Currently Exhibits Signs of Withdrawal:  "  I[]I Y  I[]I N  I[]I U  Currently Active in Recovery:   I[x]I Y  I[]I N  I[]I U  Social Support:   I[]I Y  I[]I N  I[]I U  Spouse/Partner Consumption: patients previous partner recently passed, prior to his passing was reportedly consuming excessive amounts of alcohol      I[]I N/A  I[x]I Y  I[]I N  I[]I U  Acknowledges/Accepts Addiction:   I[]I N/A  I[x]I Y  I[]I N  I[]I U  Advised to Quit/Cut Back:   I[]I N/A  I[x]I Y  I[]I N  I[]I U  Alcohol/Drug Cessation ("Wants to Quit"): Interested in Quitting  I[]I N/A  I[]I Y  I[]I N  I[]I U  Motivation to Pursue Treatment: High  I[x]I N/A  I[]I Y  I[]I N  I[]I U  Tobacco Cessation ("Wants to Quit"):      DSM-5-TR SUBSTANCE USE DISORDER CRITERIA:      -- Impaired Control:  I[x]I Y  I[]I N  I[]I U  I[]I A  I[]I D  Often take in larger amounts or over a longer period of time than was intended:   I[x]I Y  I[]I N  I[]I U  I[]I A  I[]I D  Persistent desire or unsuccessful efforts to cut down or control use:   I[]I Y  I[x]I N  I[]I U  I[]I A  I[]I D  Great deal of time spent in activities necessary to obtain substance, use, or recover from effects:   I[x]I Y  I[]I N  I[]I U  I[]I A  I[]I D  Craving/strong desire for substance or urge to use:   -- Social Impairment:  I[x]I Y  I[]I N  I[]I U  I[]I A  I[]I D  Use resulting in failure to fulfill major role obligations at home, work or school:   I[]I Y  I[x]I N  I[]I U  I[]I A  I[]I D  Social, occupational, recreational activities decreased because of use:   I[x]I Y  I[]I N  I[]I U  I[]I A  I[]I D  Continued use despite having persistent or recurrent social or interpersonal problems caused or exacerbated by the substance:   -- Risky Use:  I[]I Y  I[x]I N  I[]I U  I[]I A  I[]I D  Recurrent use in situations in which it is physically hazardous:   I[x]I Y  I[]I N  I[]I U  I[]I A  I[]I D  Use despite physical or psychological problems that are likely to have been caused or exacerbated by the substance:   -- Neuroadaptation:  I[x]I Y  " I[]I N  I[]I U  I[]I A  I[]I D  Tolerance, as defined by either of the following: (1) a need for markedly increased amounts of substance to achieve intoxication or desired effect.  -OR- (2) a markedly diminished effect with continued use of the same amount of substance:   I[x]I Y  I[]I N  I[]I U  I[]I A  I[]I D  Withdrawal, as manifested by either of the following: (1) the characteristic withdrawal syndrome for substance.  -OR- (2) substance is taken to relieve or avoid withdrawal symptoms:   -- Mild (1-3), Moderate (4-5), Severe (?6)     I[]I N/A  I[x]I Y  I[]I N  I[]I U  I[]I A  I[]I D  Active Substance Use Disorder:         HISTORY:      I[]I Patient denies any history, and none is known.  I[]I Patient unable or unwilling to provide any history.     I[]I Y  I[]I N  I[]I U  Psychiatric Diagnoses: Depression  I[]I Y  I[x]I N  I[]I U  Current Psychiatric Provider (if Applicable):   I[]I Y  I[x]I N  I[]I U  Hx of Psychiatric Hospitalization:   I[x]I Y  I[]I N  I[]I U  Hx of Outpatient Psychiatric Treatment (psychiatry/psychotherapy):   I[x]I Y  I[]I N  I[]I U  Psychotropic Trials: Lexapro  I[]I Y  I[x]I N  I[]I U  Prior Suicide Attempts:   I[x]I Y  I[]I N  I[]I U  Hx of Suicidal Ideation:   I[]I Y  I[x]I N  I[]I U  Hx of Homicidal Ideation:   I[]I Y  I[x]I N  I[]I U  Hx of Self-Injurious Behavior (Non-Suicidal):   I[]I Y  I[x]I N  I[]I U  Hx of Violence:   I[]I Y  I[x]I N  I[]I U  Documented Hx of Malingering:      FAMILY HISTORY:  I[]I Y  I[x]I N  I[]I U         I[]I Y  I[x]I N  I[]I U  Hx of Trauma/Neglect:   I[x]I Y  I[]I N  I[]I U  Hx of Physical Abuse:   I[]I Y  I[x]I N  I[]I U  Hx of Sexual Abuse:   I[]I Y  I[x]I N  I[]I U  Grew Up Locally?: [place]  I[x]I Y  I[]I N  I[]I U  Happy Childhood?:   I[]I Y  I[x]I N  I[]I U  Significant Developmental Delay/Disability?:   I[x]I Y  I[]I N  I[]I U  GED/High School Dipoloma?:   I[x]I Y  I[]I N  I[]I U  Post High School Education?:   I[x]I Y  I[]I N  I[]I U  Currently  Employed?:   I[]I Y  I[x]I N  I[]I U  On or Applying for Disability?:   I[x]I Y  I[]I N  I[]I U  Functions Independently?:   I[x]I Y  I[]I N  I[]I U  Financially Stable?:   I[x]I Y  I[]I N  I[]I U  Domiciled?:   I[x]I Y  I[]I N  I[]I U  Lives Alone?:   I[x]I Y  I[]I N  I[]I U  Heterosexual/Cisgender?:   I[]I Y  I[x]I N  I[]I U  Currently in a Romantic Relationship?:   I[x]I Y  I[]I N  I[]I U  Ever ?:   I[]I Y  I[x]I N  I[]I U  Children/Dependents?:   I[x]I Y  I[]I N  I[]I U  Church/Spiritual?:   I[]I Y  I[x]I N  I[]I U   History?:   I[]I Y  I[x]I N  I[]I U  Current Legal Issues:   I[]I Y  I[x]I N  I[]I U  Past Charges/Convictions:   I[]I Y  I[x]I N  I[]I U  Hx of Incarceration:   I[]I Y  I[x]I N  I[]I U  Engaged in Hobbies/Recreational Activities?:   I[]I Y  I[x]I N  I[]I U  Access to a Gun?:   NOTE: patient counseled on gun safety, including safe storage.  NOTE: patient counseled on inherent risks associated with gun ownership.     I[]I Y  I[x]I N  I[]I U  Hx of Seizure:   I[x]I Y  I[]I N  I[]I U  Hx of Significant Head Trauma (e.g., Loss of Consciousness, Concussion, Coma):    I[x]I Y  I[]I N  I[]I U  Medical History & Diagnoses:        The patient's past medical history has been reviewed and updated as appropriate within the electronic medical record system.  Patient Active Problem List   Diagnosis    Decompensated hepatic cirrhosis    Ascites due to alcoholic cirrhosis    Thrombocytopenia    Acute on chronic blood loss anemia    Hyperbilirubinemia    Hyponatremia    Alcohol use disorder, severe, dependence    Alcohol use disorder    Severe obesity (BMI >= 40)    H/O gastric sleeve        Scheduled and PRN Medications: The electronic chart was reviewed and updated as appropriate.  See Medcard for details.    Current Facility-Administered Medications:     albumin human 25% bottle 25 g, 25 g, Intravenous, Q12H, Lisa Grimes MD, Stopped at 09/27/23 1126    aluminum-magnesium  hydroxide-simethicone 200-200-20 mg/5 mL suspension 30 mL, 30 mL, Oral, QID PRN, Tee Ricardo MD    bisacodyL suppository 10 mg, 10 mg, Rectal, Daily PRN, Lisa Grimes MD    cefTRIAXone (ROCEPHIN) 2 g in dextrose 5 % in water (D5W) 100 mL IVPB (MB+), 2 g, Intravenous, Q24H, Tee Ricardo MD, Stopped at 09/26/23 2219    diphenhydrAMINE-zinc acetate 2-0.1% cream, , Topical (Top), TID PRN, Ravindra Gutiérrez MD    furosemide tablet 40 mg, 40 mg, Oral, Daily, Lisa Grimes MD, 40 mg at 09/27/23 1025    hydrOXYzine HCL tablet 25 mg, 25 mg, Oral, TID PRN, Lisa Grimes MD    lactulose 20 gram/30 mL solution Soln 15 g, 15 g, Oral, TID, Tee Ricardo MD, 15 g at 09/27/23 1400    melatonin tablet 6 mg, 6 mg, Oral, Nightly PRN, Tee Ricardo MD    methocarbamoL tablet 500 mg, 500 mg, Oral, BID PRN, Ravindra Gutiérrez MD    naloxone 0.4 mg/mL injection 0.02 mg, 0.02 mg, Intravenous, PRN, Tee Ricardo MD    ondansetron disintegrating tablet 8 mg, 8 mg, Oral, Q8H PRN, Tee Ricardo MD, 8 mg at 09/26/23 1208    pantoprazole EC tablet 40 mg, 40 mg, Oral, BID AC, Lisa Grimes MD    polyethylene glycol packet 17 g, 17 g, Oral, Daily PRN, Tee Ricardo MD    senna-docusate 8.6-50 mg per tablet 1 tablet, 1 tablet, Oral, BID, Lisa Grimes MD, 1 tablet at 09/27/23 1025    simethicone chewable tablet 80 mg, 1 tablet, Oral, QID PRN, Tee Ricardo MD, 80 mg at 09/26/23 1855    sodium chloride 0.9% flush 1-10 mL, 1-10 mL, Intravenous, Q12H PRN, Tee Ricardo MD    spironolactone tablet 100 mg, 100 mg, Oral, Daily, Lisa Grimes MD, 100 mg at 09/27/23 1025    traMADoL tablet 50 mg, 50 mg, Oral, Q6H PRN, Ravindra Gutiérrez MD, 50 mg at 09/26/23 0220     Allergies:  Patient has no known allergies.     PSYCHOSOCIAL FACTORS:  Stressors (Biopsychosocial, Cultural and Environmental): physical health, substance  "use/addiction  Functioning Relationships: good support system     STRENGTHS AND LIABILITIES:   Strength: Patient is motivated for change.  Strength: Patient has positive support network.  Strength: Patient is accepting of treatment.  Liability: Patient is in active addiction.     Additional Relevant History, As Applicable:         EXAMINATION:     BP (!) 89/52 (BP Location: Left arm)   Pulse 71   Temp 98.2 °F (36.8 °C) (Oral)   Resp (!) 25   Ht 5' 10" (1.778 m)   Wt 128.2 kg (282 lb 10.1 oz)   LMP  (LMP Unknown)   SpO2 (!) 93%   Breastfeeding No   BMI 40.55 kg/m²     MENTAL STATUS EXAMINATION:  General Appearance: ** Notable jaundiced skin color, lying in bed, fair hygiene and grooming, dressed in pt gown, distended abdomen   Behavior: ** Cooperative, pleasant, engaged    Involuntary Movements and Motor Activity: **  no abnormal involuntary movements noted  Gait and Station: **  unable to assess - patient lying down or seated  Speech and Language: **  normal rate, rhythm, volume, tone, and pitch  Mood: "okay"  Affect: **  full, reactive, appropriate  Thought Process and Associations: **  linear, goal-directed, logical  Thought Content and Perceptions: **  no suicidal or homicidal ideation, no evidence of psychosis  Sensorium: **   grossly intact to self, place, and time  Recent and Remote Memory: **   intact to conversation and past  Attention and Concentration: **   intact to conversation  Fund of Knowledge: **  consistent with educational level achieved  Insight: **   fair to good  Judgment: **  limited      Appearance: Notable jaundiced skin color, lying in bed, fair hygiene and grooming, dressed in pt gown, distended abdomen   Behavior/Cooperation: Cooperative, pleasant, engaged   Language: fluent english Speech: normal tone, normal rate, normal pitch, normal volume Mood: "Okay" Affect: full, reactive, appropriate Thought Process: linear, logical, goal-directed Thought Content: denies " SI/HI/paranoia/delusions; no objective evidence of paranoia or delusions Perception: denies AVH; no objective evidence of AVH Orientation: grossly intact to self, place, and time Memory: intact to conversation and past Attention Span/Concentration: intact to conversation Fund of Knowledge: appropriate for education level Insight: fair to good Judgment: limited    RISK MANAGEMENT:      I[]I Y  I[x]I N  I[]I U  I[]I A  Suicidal Ideation/Behavior: **   I[]I Y  I[x]I N  I[]I U  I[]I A  Homicidal Ideation/Behavior: **  I[]I Y  I[x]I N  I[]I U  I[]I A  Violence: **  I[]I Y  I[x]I N  I[]I U  I[]I A  Self-Injurious Behavior: **     The patient is deemed to be a reliable and factually accurate historian.     I[]I Y  I[x]I N  I[]I U  I[]I A  I[]I N/A  Minimization of Risk Parameters Suspected/Evident: **  I[]I Y  I[x]I N  I[]I U  I[]I A  I[]I N/A  Exaggeration of Risk Parameters Suspected/Evident: **     [] Y  [x] N  Danger to Self:   [] Y  [x] N  Danger to Others:   [] Y  [x] N  Grave Disability:       In cases of emergency, daily coverage provided by Acute/ER Psych MD, NP, PA, or SW, with contact numbers located in [facility] On Call Schedule.    Fay Nichole MD  \[facility] [place] [facility]\""-[facility] PGY-II   Department of Psychiatry  [facility]        KEY:     I[]I Y = Yes / Present / Endorses  I[]I N = No / Absent / Denies  I[]I U = Unknown / Unable to Assess / Unwilling to Participate  I[]I A = Ambiguity Exists / Accuracy Uncertain  I[]I D = Denial or Minimization is Suspected/Evident  I[]I N/A = Non-Applicable    CHART REVIEW:     Available documentation has been reviewed, and pertinent elements of the chart have been incorporated into this evaluation where appropriate.    The patient's last Epic encounter in the psychiatry department was on: Visit date not found  The patient's first Epic encounter in the psychiatry department was on:      LA/MS  AWARE  Site reviewed - No recent discrepancies or  irregularities are noted.      ADVICE AND COUNSELING:     [x] In cases of emergencies (e.g. SI/HI resulting in danger to self or others, functioning deteriorates to the level of grave disability), call 911 or [phone], or present to the emergency department for immediate assistance.  [x] Patient should not operate a motor vehicle or heavy machinery if effects of medications or underlying symptoms/condition impair the ability to safely do so.    Alcohol, Tobacco, and Drug Counseling, as well as resources, has been provided, as warranted.     Shared medical decision making and informed consent are the hallmark and bedrock of good clinical care, and as such have been employed and obtained, respectively, to the degree possible.      Risk Mitigation Strategies, Harm Reduction Techniques, and Safety Netting are important interventions that can reduce acute and chronic risk, and as such have been employed to the degree possible.    Prescription Drug Management entails the review, recommendation, or consideration without recommendation of medications, and as such was employed during the encounter.    Additional Psychoeducation has been provided, as warranted.    Discussed, to the extent possible, diagnosis, risks and benefits of proposed treatment vs alternative treatments vs no treatment, potential side effects of these treatments and the inherent unpredictability of treatment. The patient expresses understanding of the above and displays the capacity to agree with this treatment given said understanding. Patient also agrees that, currently, the benefits outweigh the risks and consents to treatment at this time.     Written material has been provided to supplement, augment, and reinforce any discussions and interventions, via the AVS or other pre-printed handouts, as warranted.      DIAGNOSTIC TESTING:     The chart was reviewed for recent diagnostic procedures and investigations, and pertinent results are noted below.    Na  136  9/26/2023   K 3.6  9/26/2023   Ca 7.8 (L)  9/26/2023  Phos *   *   Mg 1.7  9/26/2023     Glu 99  9/26/2023   HgA1c *   *    BUN 11  9/26/2023   Cr 0.9  9/26/2023   GFR >60.0  9/26/2023   Specific Gravity 1.025  9/25/2023   Protein (Urine) Trace (A)  9/25/2023   Microalbumin *   *     T Prot 5.8 (L)  9/26/2023   Alb 1.8 (L)  9/26/2023   T Bili 3.6 (H)  9/26/2023   Alk Phos 44 (L)  9/26/2023   AST 46 (H)  9/26/2023   ALT 13  9/26/2023   GGT *   *   NH3 73 (H)  9/25/2023   Amylase *   *   Lipase 50  9/25/2023    TSH 1.628  1/29/2021   Free T4 *   *  PTH *   *  Prolactin *   *   CPK *   *   Troponin I <0.01  1/9/2023   PT 22.3 (H)  9/26/2023   INR 2.2 (H)  9/26/2023    WBC 2.53 (L)  9/26/2023   RBC 2.04 (L)  9/26/2023   Hgb 7.5 (L)  9/26/2023   HCT 22.7 (L)  9/26/2023    (H)  9/26/2023  PLT 73 (L)  9/26/2023   ANC 1.0; 38.3 (L);   9/26/2023    Cholesterol *   *   Triglycerides *   *   LDL *   *   HDL *   *     B12 1829 (H)  9/25/2023   Folate 10.9  9/25/2023   Thiamine *   *   Vit D *   *     HIV 1/2 Ag/Ab Non-reactive  9/25/2023   Hep B Surface Non-reactive  9/26/2023   Hep B Core Non-reactive  9/26/2023   Hep A Non-reactive  9/26/2023   Hep C Non-reactive  9/26/2023   RPR *   *     Lithium *   *   VPA *   *   Clozapine *   *     Alcohol *   *   Benzodiazepines *   *   Barbiturates *   *   Cannabis *   *   Cocaine *   *   Amphetamines *   *   PCP *   *   Opiates *   *   Methadone *   *   Buprenorphine *   *   Fentanyl *   *   Oxycodone *   *   Tramadol *   *     Ethanol *   *  PETH *   *   EtG *   *   EtS *   *   Buprenorphine *   *   Norbuprenorphine *   *     Results for orders placed or performed during the hospital encounter of 09/05/21   EKG 12-lead (Shortness of Breath) Age > 50    Collection Time: 09/05/21  9:18 PM    Narrative    Test Reason : R06.02,    Vent. Rate : 074 BPM     Atrial Rate : 074 BPM     P-R Int : 144 ms           QRS Dur : 098 ms      QT Int : 396 ms       P-R-T Axes : 062 056 024 degrees     QTc Int : 439 ms    Normal sinus rhythm  Normal ECG  No previous ECGs available  Confirmed by VADIM CASTANON MD ([id number]) on 9/5/2021 11:46:48 PM    Referred By: YENY   SELF           Confirmed By:VADIM CASTANON MD       No results found for this or any previous visit.    CONSULTATION:     A diagnostic psychiatric evaluation was performed and responsiveness to treatment was assessed.  The patient demonstrates adequate ability/capacity to respond to treatment.    Inpatient consult to Psychiatry  Consult performed by: Fay Nichole MD  Consult ordered by: Lisa Grimes MD          - The consulting clinician was informed of the encounter documentation.  - The case was discussed and care was coordinated with the consulting clinician, including clinical impression, assessment, and treatment recommendations.

## 2023-09-26 NOTE — PLAN OF CARE
Pt tolerated paracentesis well, removed 3300 noted, admin albumin per protocol, llq dressing c/d/I, placed transportation pickup

## 2023-09-26 NOTE — HPI
Litzy Amato is a 38 y.o. female with history of alcohol abuse and recently diagnosed alcoholic cirrhosis who presents today with abdominal distention    She provides a surprisingly thorough history.  She reports that on September night, she was admitted at [facility] where she was diagnosed with alcoholic cirrhosis.  Underwent large volume paracentesis, received albumin, and received 2 units RBC transfusion for anemia.  She was started on antibiotics for SBP She states that she underwent EGD, which showed esophageal varices but were not bleeding.  She was placed on nadolol, Protonix, and Macrobid?, with which she reports compliance.  She was discharged after a week, and outpatient GI appointments were made.  Unfortunately, she states that her abdominal distention quickly recurred, and she was evaluated by her PCP who noted her to be hypotensive and appearing unwell in clinic, therefore she was referred to the ED. currently, she complains of abdominal pain due to distention.  She has recently had hematochezia with intermittent melena with each bowel movement.  She does report generalized weakness and fatigue but denies any fever, nausea, or vomiting.  She is had no hematemesis.  She denies any OTC med use at this time.  She reports that her last drink of alcohol was September 1st.

## 2023-09-26 NOTE — FIRST PROVIDER EVALUATION
Medical screening examination initiated.  I have conducted a focused provider triage encounter, findings are as follows:    Brief history of present illness:  PMH alcohol abuse, no EtOH since 9/1, recent dx cirrhosis. Instructed to come here by her doctor for cc: generalized weakness, abd pain and distension, and SOB. Recently hospitalized for 1  week, +UTI, large volume paracentesis, and needed 2 blood transfusions.    There were no vitals filed for this visit.    Pertinent physical exam:  Normotensive. Mild jaundice.     Brief workup plan:  Labs, EKG, CXR, UA, upreg.    Preliminary workup initiated; this workup will be continued and followed by the physician or advanced practice provider that is assigned to the patient when roomed.

## 2023-09-26 NOTE — NURSING
Nurses Note -- 4 Eyes      9/26/2023   12:30 AM      Skin assessed during: Admit      [x] No Altered Skin Integrity Present    [x]Prevention Measures Documented      [] Yes- Altered Skin Integrity Present or Discovered   [] LDA Added if Not in Epic (Describe Wound)   [] New Altered Skin Integrity was Present on Admit and Documented in LDA   [] Wound Image Taken    Wound Care Consulted? No    Attending Nurse:  Alejandra Richey RN/Staff Member:   Ligia BONILLA

## 2023-09-27 PROBLEM — F10.20 ALCOHOL USE DISORDER, SEVERE, DEPENDENCE: Chronic | Status: ACTIVE | Noted: 2023-09-25

## 2023-09-27 LAB
ALBUMIN SERPL BCP-MCNC: 2 G/DL (ref 3.5–5.2)
ALBUMIN SERPL BCP-MCNC: 2.1 G/DL (ref 3.5–5.2)
ALP SERPL-CCNC: 40 U/L (ref 55–135)
ALP SERPL-CCNC: 40 U/L (ref 55–135)
ALT SERPL W/O P-5'-P-CCNC: 13 U/L (ref 10–44)
ALT SERPL W/O P-5'-P-CCNC: 14 U/L (ref 10–44)
AMPHET+METHAMPHET UR QL: NEGATIVE
ANA PATTERN 1: NORMAL
ANA SER QL IF: POSITIVE
ANA TITR SER IF: NORMAL {TITER}
ANION GAP SERPL CALC-SCNC: 4 MMOL/L (ref 8–16)
ANISOCYTOSIS BLD QL SMEAR: SLIGHT
AST SERPL-CCNC: 49 U/L (ref 10–40)
AST SERPL-CCNC: 49 U/L (ref 10–40)
BARBITURATES UR QL SCN>200 NG/ML: NEGATIVE
BASOPHILS # BLD AUTO: 0.02 K/UL (ref 0–0.2)
BASOPHILS NFR BLD: 0.9 % (ref 0–1.9)
BENZODIAZ UR QL SCN>200 NG/ML: NEGATIVE
BILIRUB DIRECT SERPL-MCNC: 2.4 MG/DL (ref 0.1–0.3)
BILIRUB SERPL-MCNC: 3.7 MG/DL (ref 0.1–1)
BILIRUB SERPL-MCNC: 3.9 MG/DL (ref 0.1–1)
BUN SERPL-MCNC: 12 MG/DL (ref 6–20)
BZE UR QL SCN: NEGATIVE
CALCIUM SERPL-MCNC: 7.9 MG/DL (ref 8.7–10.5)
CANNABINOIDS UR QL SCN: NEGATIVE
CHLORIDE SERPL-SCNC: 106 MMOL/L (ref 95–110)
CO2 SERPL-SCNC: 24 MMOL/L (ref 23–29)
CREAT SERPL-MCNC: 0.9 MG/DL (ref 0.5–1.4)
CREAT UR-MCNC: 76 MG/DL (ref 15–325)
DIFFERENTIAL METHOD: ABNORMAL
EOSINOPHIL # BLD AUTO: 0.1 K/UL (ref 0–0.5)
EOSINOPHIL NFR BLD: 4.9 % (ref 0–8)
ERYTHROCYTE [DISTWIDTH] IN BLOOD BY AUTOMATED COUNT: 17 % (ref 11.5–14.5)
EST. GFR  (NO RACE VARIABLE): >60 ML/MIN/1.73 M^2
ETHANOL UR-MCNC: <10 MG/DL
GLUCOSE SERPL-MCNC: 93 MG/DL (ref 70–110)
HCT VFR BLD AUTO: 24.2 % (ref 37–48.5)
HGB BLD-MCNC: 7.8 G/DL (ref 12–16)
HYPOCHROMIA BLD QL SMEAR: ABNORMAL
IMM GRANULOCYTES # BLD AUTO: 0.01 K/UL (ref 0–0.04)
IMM GRANULOCYTES NFR BLD AUTO: 0.4 % (ref 0–0.5)
INR PPP: 2.2 (ref 0.8–1.2)
LYMPHOCYTES # BLD AUTO: 0.9 K/UL (ref 1–4.8)
LYMPHOCYTES NFR BLD: 40.4 % (ref 18–48)
MCH RBC QN AUTO: 36.4 PG (ref 27–31)
MCHC RBC AUTO-ENTMCNC: 32.2 G/DL (ref 32–36)
MCV RBC AUTO: 113 FL (ref 82–98)
METHADONE UR QL SCN>300 NG/ML: NEGATIVE
MONOCYTES # BLD AUTO: 0.3 K/UL (ref 0.3–1)
MONOCYTES NFR BLD: 13 % (ref 4–15)
NEUTROPHILS # BLD AUTO: 0.9 K/UL (ref 1.8–7.7)
NEUTROPHILS NFR BLD: 40.4 % (ref 38–73)
NRBC BLD-RTO: 0 /100 WBC
OPIATES UR QL SCN: NEGATIVE
OVALOCYTES BLD QL SMEAR: ABNORMAL
PCP UR QL SCN>25 NG/ML: NEGATIVE
PLATELET # BLD AUTO: 68 K/UL (ref 150–450)
PLATELET BLD QL SMEAR: ABNORMAL
PMV BLD AUTO: 11.1 FL (ref 9.2–12.9)
POIKILOCYTOSIS BLD QL SMEAR: SLIGHT
POTASSIUM SERPL-SCNC: 3.7 MMOL/L (ref 3.5–5.1)
PROT SERPL-MCNC: 6.1 G/DL (ref 6–8.4)
PROT SERPL-MCNC: 6.2 G/DL (ref 6–8.4)
PROTHROMBIN TIME: 22.9 SEC (ref 9–12.5)
RBC # BLD AUTO: 2.14 M/UL (ref 4–5.4)
SODIUM SERPL-SCNC: 134 MMOL/L (ref 136–145)
TOXICOLOGY INFORMATION: NORMAL
WBC # BLD AUTO: 2.23 K/UL (ref 3.9–12.7)

## 2023-09-27 PROCEDURE — 25000003 PHARM REV CODE 250: Performed by: HOSPITALIST

## 2023-09-27 PROCEDURE — 99233 PR SUBSEQUENT HOSPITAL CARE,LEVL III: ICD-10-PCS | Mod: ,,, | Performed by: INTERNAL MEDICINE

## 2023-09-27 PROCEDURE — 99222 PR INITIAL HOSPITAL CARE,LEVL II: ICD-10-PCS | Mod: ,,, | Performed by: PSYCHIATRY & NEUROLOGY

## 2023-09-27 PROCEDURE — 63600175 PHARM REV CODE 636 W HCPCS: Mod: JZ,JG | Performed by: HOSPITALIST

## 2023-09-27 PROCEDURE — 20600001 HC STEP DOWN PRIVATE ROOM

## 2023-09-27 PROCEDURE — 63600175 PHARM REV CODE 636 W HCPCS: Performed by: STUDENT IN AN ORGANIZED HEALTH CARE EDUCATION/TRAINING PROGRAM

## 2023-09-27 PROCEDURE — P9047 ALBUMIN (HUMAN), 25%, 50ML: HCPCS | Mod: JZ,JG | Performed by: HOSPITALIST

## 2023-09-27 PROCEDURE — 82525 ASSAY OF COPPER: CPT | Performed by: HOSPITALIST

## 2023-09-27 PROCEDURE — 99233 PR SUBSEQUENT HOSPITAL CARE,LEVL III: ICD-10-PCS | Mod: ,,, | Performed by: HOSPITALIST

## 2023-09-27 PROCEDURE — 25000003 PHARM REV CODE 250: Performed by: STUDENT IN AN ORGANIZED HEALTH CARE EDUCATION/TRAINING PROGRAM

## 2023-09-27 PROCEDURE — 85025 COMPLETE CBC W/AUTO DIFF WBC: CPT | Performed by: HOSPITALIST

## 2023-09-27 PROCEDURE — 36415 COLL VENOUS BLD VENIPUNCTURE: CPT | Performed by: HOSPITALIST

## 2023-09-27 PROCEDURE — 85610 PROTHROMBIN TIME: CPT | Performed by: STUDENT IN AN ORGANIZED HEALTH CARE EDUCATION/TRAINING PROGRAM

## 2023-09-27 PROCEDURE — 99233 SBSQ HOSP IP/OBS HIGH 50: CPT | Mod: ,,, | Performed by: HOSPITALIST

## 2023-09-27 PROCEDURE — 80076 HEPATIC FUNCTION PANEL: CPT | Performed by: STUDENT IN AN ORGANIZED HEALTH CARE EDUCATION/TRAINING PROGRAM

## 2023-09-27 PROCEDURE — C9113 INJ PANTOPRAZOLE SODIUM, VIA: HCPCS | Performed by: STUDENT IN AN ORGANIZED HEALTH CARE EDUCATION/TRAINING PROGRAM

## 2023-09-27 PROCEDURE — 36415 COLL VENOUS BLD VENIPUNCTURE: CPT | Performed by: STUDENT IN AN ORGANIZED HEALTH CARE EDUCATION/TRAINING PROGRAM

## 2023-09-27 PROCEDURE — 99233 SBSQ HOSP IP/OBS HIGH 50: CPT | Mod: ,,, | Performed by: INTERNAL MEDICINE

## 2023-09-27 PROCEDURE — 80053 COMPREHEN METABOLIC PANEL: CPT | Performed by: HOSPITALIST

## 2023-09-27 PROCEDURE — 99222 1ST HOSP IP/OBS MODERATE 55: CPT | Mod: ,,, | Performed by: PSYCHIATRY & NEUROLOGY

## 2023-09-27 PROCEDURE — 80307 DRUG TEST PRSMV CHEM ANLYZR: CPT | Performed by: STUDENT IN AN ORGANIZED HEALTH CARE EDUCATION/TRAINING PROGRAM

## 2023-09-27 PROCEDURE — 25000003 PHARM REV CODE 250: Performed by: INTERNAL MEDICINE

## 2023-09-27 RX ORDER — AMOXICILLIN 250 MG
1 CAPSULE ORAL 2 TIMES DAILY
Status: DISCONTINUED | OUTPATIENT
Start: 2023-09-27 | End: 2023-10-02

## 2023-09-27 RX ORDER — ALBUMIN HUMAN 250 G/1000ML
25 SOLUTION INTRAVENOUS EVERY 12 HOURS
Status: COMPLETED | OUTPATIENT
Start: 2023-09-27 | End: 2023-09-27

## 2023-09-27 RX ORDER — HYDROXYZINE HYDROCHLORIDE 25 MG/1
25 TABLET, FILM COATED ORAL 3 TIMES DAILY PRN
Status: DISCONTINUED | OUTPATIENT
Start: 2023-09-27 | End: 2023-10-02 | Stop reason: HOSPADM

## 2023-09-27 RX ORDER — PANTOPRAZOLE SODIUM 40 MG/1
40 TABLET, DELAYED RELEASE ORAL
Status: DISCONTINUED | OUTPATIENT
Start: 2023-09-27 | End: 2023-10-02 | Stop reason: HOSPADM

## 2023-09-27 RX ORDER — ALBUMIN HUMAN 250 G/1000ML
25 SOLUTION INTRAVENOUS EVERY 12 HOURS
Status: DISCONTINUED | OUTPATIENT
Start: 2023-09-27 | End: 2023-09-27

## 2023-09-27 RX ORDER — BISACODYL 10 MG
10 SUPPOSITORY, RECTAL RECTAL DAILY PRN
Status: DISCONTINUED | OUTPATIENT
Start: 2023-09-27 | End: 2023-10-02 | Stop reason: HOSPADM

## 2023-09-27 RX ORDER — SPIRONOLACTONE 100 MG/1
100 TABLET, FILM COATED ORAL DAILY
Status: DISCONTINUED | OUTPATIENT
Start: 2023-09-27 | End: 2023-10-02 | Stop reason: HOSPADM

## 2023-09-27 RX ORDER — CETIRIZINE HYDROCHLORIDE 5 MG/1
5 TABLET ORAL ONCE
Status: COMPLETED | OUTPATIENT
Start: 2023-09-27 | End: 2023-09-27

## 2023-09-27 RX ORDER — FUROSEMIDE 40 MG/1
40 TABLET ORAL DAILY
Status: DISCONTINUED | OUTPATIENT
Start: 2023-09-27 | End: 2023-10-02 | Stop reason: HOSPADM

## 2023-09-27 RX ADMIN — LACTULOSE 15 G: 20 SOLUTION ORAL at 09:09

## 2023-09-27 RX ADMIN — TRAMADOL HYDROCHLORIDE 50 MG: 50 TABLET, COATED ORAL at 09:09

## 2023-09-27 RX ADMIN — SENNOSIDES AND DOCUSATE SODIUM 1 TABLET: 50; 8.6 TABLET ORAL at 09:09

## 2023-09-27 RX ADMIN — SIMETHICONE 80 MG: 80 TABLET, CHEWABLE ORAL at 09:09

## 2023-09-27 RX ADMIN — LACTULOSE 15 G: 20 SOLUTION ORAL at 02:09

## 2023-09-27 RX ADMIN — ALBUMIN (HUMAN) 25 G: 12.5 SOLUTION INTRAVENOUS at 09:09

## 2023-09-27 RX ADMIN — ALBUMIN (HUMAN) 25 G: 12.5 SOLUTION INTRAVENOUS at 10:09

## 2023-09-27 RX ADMIN — PANTOPRAZOLE SODIUM 40 MG: 40 TABLET, DELAYED RELEASE ORAL at 03:09

## 2023-09-27 RX ADMIN — SPIRONOLACTONE 100 MG: 100 TABLET ORAL at 10:09

## 2023-09-27 RX ADMIN — CEFTRIAXONE 2 G: 2 INJECTION, POWDER, FOR SOLUTION INTRAMUSCULAR; INTRAVENOUS at 11:09

## 2023-09-27 RX ADMIN — CETIRIZINE HYDROCHLORIDE 5 MG: 5 TABLET, FILM COATED ORAL at 05:09

## 2023-09-27 RX ADMIN — SENNOSIDES AND DOCUSATE SODIUM 1 TABLET: 50; 8.6 TABLET ORAL at 10:09

## 2023-09-27 RX ADMIN — PANTOPRAZOLE SODIUM 40 MG: 40 INJECTION, POWDER, FOR SOLUTION INTRAVENOUS at 09:09

## 2023-09-27 RX ADMIN — FUROSEMIDE 40 MG: 40 TABLET ORAL at 10:09

## 2023-09-27 RX ADMIN — LACTULOSE 15 G: 20 SOLUTION ORAL at 08:09

## 2023-09-27 NOTE — PROGRESS NOTES
[facility] - Telemetry Stepdown  Hepatology  Progress Note    Patient Name: Litzy Amato  MRN: [id number]  Admission Date: 9/25/2023  Hospital Length of Stay: 2 days  Attending Provider: Lisa Grimes MD   Primary Care Physician: No, Primary Doctor  Principal Problem:Decompensated hepatic cirrhosis    Subjective:     Transplant status: No    HPI: Ms. Litzy Amato is a 38 year old female for whom hepatology is consulted for management of decompensated cirrhosis. She has a PMH significant for ETOH cirrhosis (diagnosed in approximately 2021 although she was not told she has cirrhosis until this year; associated with ascites and esophageal varices). She has a PSH significant for obesity (status post gastric sleeve in 2013). No other abdominal procedures in the past. She currently vapes, previous cigarette use was only while intoxicated. No other drug use.    Her heavy drinking started in 2019/2020 due to stress from work associated with traveling during covid with minimal exposure to friends/family. She would drink two pints of vodka per day. She checked herself into rehab in May of 2022 for 30 days and was sober until November of that year. She has had 6+ close friends/family deaths in the last few years including her  and AA sponsor which have pushed her back into alcoholism. Her last drink was 9/9. She currently lives with her parents who are both pastors. She is unemployed at this time and has no children.    On chart review, patient noted to have history of ETOH cirrhosis since at least 01/2021 with documented admission at [facility] in [place] for GI bleeding (hematemesis) attributed to portal hypertensive gastropathy seen on EGD with cirrhotic appearing liver and splenomegaly seen on US. During that admission, patient reported history of consuming vodka heavily since approximately 2020 with associated 80 pounds of unintentional weight loss; reported to be counseled on need for ETOH  cessation during that admission. She was discharged with referral to hepatology in [facility]; however unclear if this was scheduled. She is noted to have ED evaluation in 04/2022 for RUQ pain associated with recurrent binge drinking; she was given IVF and PPI.     She was recently admitted to [facility] from 9/9/2023 to 9/16/2023 for evaluation of progressive abdominal distension; records from this admission not available for review. She reports undergoing paracentesis and being given antibiotics for SBP. She was noted to have anemia and reports undergoing and EGD on 9/12 and being told she had non-bleeding esophageal varices. She was discharged on Nadolol with PCP follow-up. Following discharge, she reports recurrent abdominal distension prompting a visit with her PCP who suggested she come here for evaluation on 9/25.     She has gained 70 pounds of water weight over the last 9 months causing significant limitations in mobility and back pain.     Hospital Course:   On arrival, vital signs normal on room air. Labs notable for macrocytic anemia (Hgb 8.5) with normal B12/folate, thrombocytopenia (109), elevated INR (1.9), normal Cr, and hyperbilirubinemia (5). She was admitted to hospital medicine and hepatology consulted.        Interval History: NAEON. VSS. Patient underwent paracentesis yesterday with 3.3 L removed, no SBP. Liver doppler with proper directional flow. CXR with small bilateral pleural effusions, bibasilar atelectasis. UA positive for infection on admission, on ceftriaxone. Patient complaining only of abdominal pain in the area of the paracentesis today. Ceruloplasmin 14, IgG 2274, pending further serology workup.     Current Facility-Administered Medications   Medication    aluminum-magnesium hydroxide-simethicone 200-200-20 mg/5 mL suspension 30 mL    cefTRIAXone (ROCEPHIN) 2 g in dextrose 5 % in water (D5W) 100 mL IVPB (MB+)    diphenhydrAMINE-zinc acetate 2-0.1% cream     lactulose 20 gram/30 mL solution Soln 15 g    melatonin tablet 6 mg    methocarbamoL tablet 500 mg    naloxone 0.4 mg/mL injection 0.02 mg    ondansetron disintegrating tablet 8 mg    oxyCODONE immediate release tablet 5 mg    pantoprazole injection 40 mg    polyethylene glycol packet 17 g    simethicone chewable tablet 80 mg    sodium chloride 0.9% flush 1-10 mL    traMADoL tablet 50 mg       Objective:     Vital Signs (Most Recent):  Temp: 98 °F (36.7 °C) (09/27/23 0812)  Pulse: 78 (09/27/23 0812)  Resp: 18 (09/27/23 0812)  BP: (!) 103/54 (09/27/23 0812)  SpO2: (!) 92 % (09/27/23 0812) Vital Signs (24h Range):  Temp:  [96.6 °F (35.9 °C)-98.4 °F (36.9 °C)] 98 °F (36.7 °C)  Pulse:  [63-78] 78  Resp:  [15-25] 18  SpO2:  [92 %-99 %] 92 %  BP: ()/(52-65) 103/54     Weight: 128.2 kg (282 lb 10.1 oz) (09/26/23 0026)  Body mass index is 40.55 kg/m².       Physical Exam  Vitals reviewed.   Constitutional:       General: She is not in acute distress.     Appearance: She is obese. She is not toxic-appearing.   HENT:      Head: Normocephalic and atraumatic.   Eyes:      General: Scleral icterus present.      Extraocular Movements: Extraocular movements intact.   Cardiovascular:      Rate and Rhythm: Normal rate and regular rhythm.      Heart sounds: Normal heart sounds.   Pulmonary:      Effort: No respiratory distress.      Breath sounds: Normal breath sounds.   Abdominal:      General: There is distension.      Tenderness: There is abdominal tenderness. There is no guarding or rebound.   Musculoskeletal:         General: Swelling present.      Cervical back: Neck supple.      Right lower leg: Edema present.      Left lower leg: Edema present.      Comments: Dependent 2-3+ pitting edema abdomen, legs   Skin:     General: Skin is warm and dry.      Coloration: Skin is jaundiced.      Findings: Bruising present.      Comments: Angiomas on the chest   Neurological:      General: No focal deficit present.       Mental Status: She is alert and oriented to person, place, and time.      Comments: No asterixis   Psychiatric:         Mood and Affect: Mood normal.         Behavior: Behavior normal.         Thought Content: Thought content normal.         Judgment: Judgment normal.            MELD 3.0: 24 at 9/27/2023  5:15 AM  MELD-Na: 21 at 9/27/2023  5:15 AM  Calculated from:  Serum Creatinine: 0.9 mg/dL (Using min of 1 mg/dL) at 9/26/2023  3:45 AM  Serum Sodium: 136 mmol/L at 9/26/2023  3:45 AM  Total Bilirubin: 3.7 mg/dL at 9/27/2023  5:15 AM  Serum Albumin: 2.0 g/dL at 9/27/2023  5:15 AM  INR(ratio): 2.2 at 9/27/2023  5:15 AM  Age at listing (hypothetical): 38 years  Sex: Female at 9/27/2023  5:15 AM      Significant Labs:  CBC:   Recent Labs   Lab 09/26/23 0346   WBC 2.53*   RBC 2.04*   HGB 7.5*   HCT 22.7*   PLT 73*     CMP:   Recent Labs   Lab 09/26/23 0345 09/27/23  0515   GLU 99  --    CALCIUM 7.8*  --    ALBUMIN 1.8* 2.0*   PROT 5.8* 6.1     --    K 3.6  --    CO2 22*  --      --    BUN 11  --    CREATININE 0.9  --    ALKPHOS 44* 40*   ALT 13 14   AST 46* 49*   BILITOT 3.6* 3.7*     Coagulation:   Recent Labs   Lab 09/27/23  0515   INR 2.2*       Significant Imaging:  Labs: Reviewed  X-Ray: Reviewed  US: Reviewed    Assessment/Plan:     GI  * Decompensated hepatic cirrhosis  38 year old woman with decompensated alcoholic cirrhosis due to ascites. She has known esophageal varices and significant psychosocial history associated with her alcohol use. Good support system at home. Meld 3.0 of 25 on admission, 24 today. Anemia workup with macrocytic anemia, normal iron, B9, B12. Only previous abdominal surgery is gastric sleeve in 2013. Has attended rehab and been associated with AA in the past. She has good insight to her current situation. Liver doppler with proper directional flow. CXR negative for infection with b/l pleural effusions and bibasilar atelectasis. Ceruloplasmin 14, IgG 2274. On ceftriaxone for  UTI. No SBP s/p paracentesis with 3.3L removed. BC NGTD.    Recommendations:  - Follow up addiction psych recommendations  - Pending autoimmune labs: Anti-mitochondrial, anti-SM, a1 antitrypsin  - Therapeutic paracentesis tomorrow am  - Albumin 25g TID today  - Start lasix 40mg and Spironolactone 100mg  - Continue ceftriaxone, pending urine cultures  - 24 hour urine copper due to low ceruloplasmin   - Continue lactulose      Thank you for your consult. I will follow-up with patient. Please contact us if you have any additional questions.    Ankita Sosa MD  Hepatology  [facility] - Telemetry Stepdown

## 2023-09-27 NOTE — SUBJECTIVE & OBJECTIVE
Interval History: NAEON. VSS. Patient underwent paracentesis yesterday with 3.3 L removed, no SBP. Liver doppler with proper directional flow. CXR with small bilateral pleural effusions, bibasilar atelectasis. UA positive for infection on admission, on ceftriaxone. Patient complaining only of abdominal pain in the area of the paracentesis today. Ceruloplasmin 14, IgG 2274, pending further serology workup.     Current Facility-Administered Medications   Medication    aluminum-magnesium hydroxide-simethicone 200-200-20 mg/5 mL suspension 30 mL    cefTRIAXone (ROCEPHIN) 2 g in dextrose 5 % in water (D5W) 100 mL IVPB (MB+)    diphenhydrAMINE-zinc acetate 2-0.1% cream    lactulose 20 gram/30 mL solution Soln 15 g    melatonin tablet 6 mg    methocarbamoL tablet 500 mg    naloxone 0.4 mg/mL injection 0.02 mg    ondansetron disintegrating tablet 8 mg    oxyCODONE immediate release tablet 5 mg    pantoprazole injection 40 mg    polyethylene glycol packet 17 g    simethicone chewable tablet 80 mg    sodium chloride 0.9% flush 1-10 mL    traMADoL tablet 50 mg       Objective:     Vital Signs (Most Recent):  Temp: 98 °F (36.7 °C) (09/27/23 0812)  Pulse: 78 (09/27/23 0812)  Resp: 18 (09/27/23 0812)  BP: (!) 103/54 (09/27/23 0812)  SpO2: (!) 92 % (09/27/23 0812) Vital Signs (24h Range):  Temp:  [96.6 °F (35.9 °C)-98.4 °F (36.9 °C)] 98 °F (36.7 °C)  Pulse:  [63-78] 78  Resp:  [15-25] 18  SpO2:  [92 %-99 %] 92 %  BP: ()/(52-65) 103/54     Weight: 128.2 kg (282 lb 10.1 oz) (09/26/23 0026)  Body mass index is 40.55 kg/m².       Physical Exam  Vitals reviewed.   Constitutional:       General: She is not in acute distress.     Appearance: She is obese. She is not toxic-appearing.   HENT:      Head: Normocephalic and atraumatic.   Eyes:      General: Scleral icterus present.      Extraocular Movements: Extraocular movements intact.   Cardiovascular:      Rate and Rhythm: Normal rate and regular rhythm.      Heart sounds: Normal  heart sounds.   Pulmonary:      Effort: No respiratory distress.      Breath sounds: Normal breath sounds.   Abdominal:      General: There is distension.      Tenderness: There is abdominal tenderness. There is no guarding or rebound.   Musculoskeletal:         General: Swelling present.      Cervical back: Neck supple.      Right lower leg: Edema present.      Left lower leg: Edema present.      Comments: Dependent 2-3+ pitting edema abdomen, legs   Skin:     General: Skin is warm and dry.      Coloration: Skin is jaundiced.      Findings: Bruising present.      Comments: Angiomas on the chest   Neurological:      General: No focal deficit present.      Mental Status: She is alert and oriented to person, place, and time.      Comments: No asterixis   Psychiatric:         Mood and Affect: Mood normal.         Behavior: Behavior normal.         Thought Content: Thought content normal.         Judgment: Judgment normal.            MELD 3.0: 24 at 9/27/2023  5:15 AM  MELD-Na: 21 at 9/27/2023  5:15 AM  Calculated from:  Serum Creatinine: 0.9 mg/dL (Using min of 1 mg/dL) at 9/26/2023  3:45 AM  Serum Sodium: 136 mmol/L at 9/26/2023  3:45 AM  Total Bilirubin: 3.7 mg/dL at 9/27/2023  5:15 AM  Serum Albumin: 2.0 g/dL at 9/27/2023  5:15 AM  INR(ratio): 2.2 at 9/27/2023  5:15 AM  Age at listing (hypothetical): 38 years  Sex: Female at 9/27/2023  5:15 AM      Significant Labs:  CBC:   Recent Labs   Lab 09/26/23 0346   WBC 2.53*   RBC 2.04*   HGB 7.5*   HCT 22.7*   PLT 73*     CMP:   Recent Labs   Lab 09/26/23 0345 09/27/23  0515   GLU 99  --    CALCIUM 7.8*  --    ALBUMIN 1.8* 2.0*   PROT 5.8* 6.1     --    K 3.6  --    CO2 22*  --      --    BUN 11  --    CREATININE 0.9  --    ALKPHOS 44* 40*   ALT 13 14   AST 46* 49*   BILITOT 3.6* 3.7*     Coagulation:   Recent Labs   Lab 09/27/23  0515   INR 2.2*       Significant Imaging:  Labs: Reviewed  X-Ray: Reviewed  US: Reviewed

## 2023-09-27 NOTE — ASSESSMENT & PLAN NOTE
Likely secondary to cirrhosis.  Reports active hematochezia, however this is chronic and she is not hemodynamically unstable. Suspect hemorrhoids. We will monitor.

## 2023-09-27 NOTE — PLAN OF CARE
Problem: Adult Inpatient Plan of Care  Goal: Plan of Care Review  Outcome: Ongoing, Progressing  Goal: Patient-Specific Goal (Individualized)  Outcome: Ongoing, Progressing  Goal: Absence of Hospital-Acquired Illness or Injury  Outcome: Ongoing, Progressing  Goal: Optimal Comfort and Wellbeing  Outcome: Ongoing, Progressing  Goal: Readiness for Transition of Care  Outcome: Ongoing, Progressing     No acute changes occur this shift. No fall this shift. Call light within reach. Will continue to monitor.

## 2023-09-27 NOTE — PROGRESS NOTES
[facility] - Telemetry [facility] Medicine  Progress Note    Patient Name: Litzy Amato  MRN: [id number]  Patient Class: IP- Inpatient   Admission Date: 9/25/2023  Length of Stay: 2 days  Attending Physician: Lisa Grimes MD  Primary Care Provider: Denise, Primary Doctor        Subjective:     Principal Problem:Decompensated hepatic cirrhosis        HPI:  Litzy Amato is a 38 y.o. female with history of alcohol abuse and recently diagnosed alcoholic cirrhosis who presents today with abdominal distention    She provides a surprisingly thorough history.  She reports that on September night, she was admitted at [facility] where she was diagnosed with alcoholic cirrhosis.  Underwent large volume paracentesis, received albumin, and received 2 units RBC transfusion for anemia.  She was started on antibiotics for SBP She states that she underwent EGD, which showed esophageal varices but were not bleeding.  She was placed on nadolol, Protonix, and Macrobid?, with which she reports compliance.  She was discharged after a week, and outpatient GI appointments were made.  Unfortunately, she states that her abdominal distention quickly recurred, and she was evaluated by her PCP who noted her to be hypotensive and appearing unwell in clinic, therefore she was referred to the ED. currently, she complains of abdominal pain due to distention.  She has recently had hematochezia with intermittent melena with each bowel movement.  She does report generalized weakness and fatigue but denies any fever, nausea, or vomiting.  She is had no hematemesis.  She denies any OTC med use at this time.  She reports that her last drink of alcohol was September 1st.      Overview/Hospital Course:  No notes on file    Interval History:   9/27: constipation reported. Repeat Paracentesis tomorrow    Review of Systems   Constitutional:  Positive for activity change and unexpected weight change. Negative for chills and  fever.   HENT:  Negative for congestion.    Eyes:  Negative for visual disturbance.   Respiratory:  Negative for cough and shortness of breath.    Cardiovascular:  Positive for leg swelling. Negative for chest pain and palpitations.   Gastrointestinal:  Positive for abdominal distention, abdominal pain, anal bleeding and blood in stool. Negative for constipation, diarrhea and vomiting.   Genitourinary:  Negative for difficulty urinating and dysuria.   Musculoskeletal:  Positive for back pain.   Skin:  Positive for color change.   Neurological:  Positive for weakness. Negative for tremors.   Psychiatric/Behavioral:  Negative for behavioral problems and confusion.      Objective:     Vital Signs (Most Recent):  Temp: 98 °F (36.7 °C) (09/27/23 0812)  Pulse: 78 (09/27/23 0812)  Resp: 18 (09/27/23 0812)  BP: (!) 103/54 (09/27/23 0812)  SpO2: (!) 92 % (09/27/23 0812) Vital Signs (24h Range):  Temp:  [96.6 °F (35.9 °C)-98.4 °F (36.9 °C)] 98 °F (36.7 °C)  Pulse:  [63-78] 78  Resp:  [15-25] 18  SpO2:  [92 %-99 %] 92 %  BP: ()/(52-65) 103/54     Weight: 128.2 kg (282 lb 10.1 oz)  Body mass index is 40.55 kg/m².    Intake/Output Summary (Last 24 hours) at 9/27/2023 0933  Last data filed at 9/26/2023 1214  Gross per 24 hour   Intake --   Output 3300 ml   Net -3300 ml         Physical Exam  Vitals reviewed.   Constitutional:       General: She is not in acute distress.     Appearance: She is obese. She is not toxic-appearing.   HENT:      Head: Normocephalic and atraumatic.   Eyes:      General: Scleral icterus present.      Extraocular Movements: Extraocular movements intact.   Cardiovascular:      Rate and Rhythm: Normal rate and regular rhythm.      Heart sounds: Normal heart sounds.   Pulmonary:      Effort: No respiratory distress.      Breath sounds: Normal breath sounds.   Abdominal:      General: There is distension.      Tenderness: There is abdominal tenderness. There is no guarding or rebound.   Musculoskeletal:          General: Swelling present.      Cervical back: Neck supple.      Right lower leg: Edema present.      Left lower leg: Edema present.      Comments: Dependent 2-3+ pitting edema abdomen, legs   Skin:     General: Skin is warm and dry.      Coloration: Skin is jaundiced.      Findings: Bruising present.      Comments: Angiomas on the chest   Neurological:      General: No focal deficit present.      Mental Status: She is alert and oriented to person, place, and time.      Comments: No asterixis   Psychiatric:         Mood and Affect: Mood normal.         Behavior: Behavior normal.         Thought Content: Thought content normal.         Judgment: Judgment normal.             Significant Labs: All pertinent labs within the past 24 hours have been reviewed.    Significant Imaging: I have reviewed all pertinent imaging results/findings within the past 24 hours.      Assessment/Plan:      * Decompensated hepatic cirrhosis  Patient presents with increased ascites and generalized malaise.  Recently diagnosed with likely alcoholic cirrhosis, and had a prolonged admission requiring large volume paracentesis, EGD which showed esophageal varices, and treatment for SBP per patient's report.  MELD-NA 21. We will consult IR for diagnostic and therapeutic paracentesis.  Start ceftriaxone for empiric SBP coverage but also possible UTI reported from outpatient. .  AFP ordered.      Not in tx evaluation due to alcohol relapse      Severe obesity (BMI >= 40)  Body mass index is 40.55 kg/m². Morbid obesity complicates all aspects of disease management from diagnostic modalities to treatment. Weight loss encouraged and health benefits explained to patient.         Alcohol abuse  Patient reports heavy alcohol abuse for at least the past 3 years, with her last drink September 1st.  Would advise continued cessation.      Hyponatremia  Mild, and asymptomatic.  Likely secondary to liver disease.  Will  monitor.    Hyperbilirubinemia  Likely due to cirrhosis versus infection.  Appreciate GI recommendations.      Acute on chronic blood loss anemia  Patient reports priscilla hematochezia and intermittent melena with each bowel movement over the past couple of weeks.  Recently required blood transfusion at outside hospital.  Currently without indication for transfusion here, however will monitor.  Given reported esophageal varices, we will start b.i.d. PPI.  Appreciate hepatology recommendations.      Thrombocytopenia  Likely secondary to cirrhosis.  Reports active hematochezia, however this is chronic and she is not hemodynamically unstable. Suspect hemorrhoids. We will monitor.    Ascites due to alcoholic cirrhosis  As above, we will consult IR for diagnostic and therapeutic paracentesis.      VTE Risk Mitigation (From admission, onward)         Ordered     Reason for No Pharmacological VTE Prophylaxis  Once        Question Answer Comment   Reasons: Active Bleeding    Reasons: Risk of Bleeding        09/26/23 0056     IP VTE HIGH RISK PATIENT  Once         09/26/23 0056     Place sequential compression device  Until discontinued         09/26/23 0056                Discharge Planning   MUKUND: 9/28/2023     Code Status: Full Code   Is the patient medically ready for discharge?:     Reason for patient still in hospital (select all that apply): Patient trending condition                     Lisa Grimes MD  Department of Hospital Medicine   [facility] - Telemetry Stepdown

## 2023-09-27 NOTE — PLAN OF CARE
[facility] - Telemetry Stepdown  Initial Discharge Assessment       Primary Care Provider: Denise, Primary Doctor    Admission Diagnosis: Shortness of breath [R06.02]  Ascites [R18.8]    Admission Date: 9/25/2023  Expected Discharge Date: 9/28/2023    Transition of Care Barriers: (P) None    Payor: MEDICAID / Plan: [place] [employer] / Product Type: Managed Medicaid /     Extended Emergency Contact Information  Primary Emergency Contact: Heaven Amato  Address: [address] [place] [address] [place]  Mobile Phone: [phone]  Relation: Sister    Discharge Plan A: (P) Home       No Pharmacies Listed    CM spoke with patient regarding discharge planning. Patient states that she was discharged earlie this month from [facility]. Prior to this hospital admission, patient lived home in a single story home with no stairs to enter. Patient states that she has a community that consists of family and friends to help her, if needed, on discharge. Will continue to follow patient for any post acute needs.  Initial Assessment (most recent)       Adult Discharge Assessment - 09/27/23 1004          Discharge Assessment    Assessment Type Discharge Planning Assessment (P)      Confirmed/corrected address, phone number and insurance Yes (P)      Confirmed Demographics Correct on Facesheet (P)      Source of Information patient (P)      Communicated MUKUND with patient/caregiver Date not available/Unable to determine (P)      Reason For Admission Decompensated hepatic cirrhosis (P)      Do you have help at home or someone to help you manage your care at home? Yes (P)      Who are your caregiver(s) and their phone number(s)? Family and friends (P)      Prior to hospitilization cognitive status: Alert/Oriented (P)      Current cognitive status: Alert/Oriented (P)      Walking or Climbing Stairs -- (P)    IND    Dressing/Bathing -- (P)    IND    Home Layout Able to live on 1st floor (P)       Equipment Currently Used at Home none (P)      Readmission within 30 days? Yes (P)    [facility]    Patient currently being followed by outpatient case management? No (P)      Do you currently have service(s) that help you manage your care at home? No (P)      Do you take prescription medications? Yes (P)      Do you have prescription coverage? Yes (P)      Coverage Medicaid (P)      Do you have any problems affording any of your prescribed medications? No (P)      Is the patient taking medications as prescribed? yes (P)      Who is going to help you get home at discharge? family and friends (P)      How do you get to doctors appointments? car, drives self (P)      Are you on dialysis? No (P)      Do you take coumadin? No (P)      DME Needed Upon Discharge  -- (P)    TBD    Discharge Plan discussed with: Patient (P)      Transition of Care Barriers None (P)      Discharge Plan A Home (P)         Physical Activity    On average, how many days per week do you engage in moderate to strenuous exercise (like a brisk walk)? 0 days (P)      On average, how many minutes do you engage in exercise at this level? 0 min (P)         Financial Resource Strain    How hard is it for you to pay for the very basics like food, housing, medical care, and heating? Somewhat hard (P)         Housing Stability    In the last 12 months, was there a time when you were not able to pay the mortgage or rent on time? No (P)      In the last 12 months, how many places have you lived? 2 (P)      In the last 12 months, was there a time when you did not have a steady place to sleep or slept in a shelter (including now)? No (P)         Transportation Needs    In the past 12 months, has lack of transportation kept you from medical appointments or from getting medications? Yes (P)      In the past 12 months, has lack of transportation kept you from meetings, work, or from getting things needed for daily living? Yes (P)         Food Insecurity     Within the past 12 months, you worried that your food would run out before you got the money to buy more. Never true (P)      Within the past 12 months, the food you bought just didn't last and you didn't have money to get more. Never true (P)         Stress    Do you feel stress - tense, restless, nervous, or anxious, or unable to sleep at night because your mind is troubled all the time - these days? To some extent (P)         Social Connections    In a typical week, how many times do you talk on the phone with family, friends, or neighbors? More than three times a week (P)      How often do you get together with friends or relatives? More than three times a week (P)      How often do you attend Baptism or Catholic services? More than 4 times per year (P)      Do you belong to any clubs or organizations such as Baptism groups, unions, fraternal or athletic groups, or school groups? No (P)      How often do you attend meetings of the clubs or organizations you belong to? Never (P)      Are you , , , , never , or living with a partner?  (P)                         HUNG Alcantar  Case Management  [phone]

## 2023-09-27 NOTE — SUBJECTIVE & OBJECTIVE
Interval History:   9/27: constipation reported. Repeat Paracentesis tomorrow    Review of Systems   Constitutional:  Positive for activity change and unexpected weight change. Negative for chills and fever.   HENT:  Negative for congestion.    Eyes:  Negative for visual disturbance.   Respiratory:  Negative for cough and shortness of breath.    Cardiovascular:  Positive for leg swelling. Negative for chest pain and palpitations.   Gastrointestinal:  Positive for abdominal distention, abdominal pain, anal bleeding and blood in stool. Negative for constipation, diarrhea and vomiting.   Genitourinary:  Negative for difficulty urinating and dysuria.   Musculoskeletal:  Positive for back pain.   Skin:  Positive for color change.   Neurological:  Positive for weakness. Negative for tremors.   Psychiatric/Behavioral:  Negative for behavioral problems and confusion.      Objective:     Vital Signs (Most Recent):  Temp: 98 °F (36.7 °C) (09/27/23 0812)  Pulse: 78 (09/27/23 0812)  Resp: 18 (09/27/23 0812)  BP: (!) 103/54 (09/27/23 0812)  SpO2: (!) 92 % (09/27/23 0812) Vital Signs (24h Range):  Temp:  [96.6 °F (35.9 °C)-98.4 °F (36.9 °C)] 98 °F (36.7 °C)  Pulse:  [63-78] 78  Resp:  [15-25] 18  SpO2:  [92 %-99 %] 92 %  BP: ()/(52-65) 103/54     Weight: 128.2 kg (282 lb 10.1 oz)  Body mass index is 40.55 kg/m².    Intake/Output Summary (Last 24 hours) at 9/27/2023 0933  Last data filed at 9/26/2023 1214  Gross per 24 hour   Intake --   Output 3300 ml   Net -3300 ml         Physical Exam  Vitals reviewed.   Constitutional:       General: She is not in acute distress.     Appearance: She is obese. She is not toxic-appearing.   HENT:      Head: Normocephalic and atraumatic.   Eyes:      General: Scleral icterus present.      Extraocular Movements: Extraocular movements intact.   Cardiovascular:      Rate and Rhythm: Normal rate and regular rhythm.      Heart sounds: Normal heart sounds.   Pulmonary:      Effort: No  respiratory distress.      Breath sounds: Normal breath sounds.   Abdominal:      General: There is distension.      Tenderness: There is abdominal tenderness. There is no guarding or rebound.   Musculoskeletal:         General: Swelling present.      Cervical back: Neck supple.      Right lower leg: Edema present.      Left lower leg: Edema present.      Comments: Dependent 2-3+ pitting edema abdomen, legs   Skin:     General: Skin is warm and dry.      Coloration: Skin is jaundiced.      Findings: Bruising present.      Comments: Angiomas on the chest   Neurological:      General: No focal deficit present.      Mental Status: She is alert and oriented to person, place, and time.      Comments: No asterixis   Psychiatric:         Mood and Affect: Mood normal.         Behavior: Behavior normal.         Thought Content: Thought content normal.         Judgment: Judgment normal.             Significant Labs: All pertinent labs within the past 24 hours have been reviewed.    Significant Imaging: I have reviewed all pertinent imaging results/findings within the past 24 hours.

## 2023-09-27 NOTE — ASSESSMENT & PLAN NOTE
Patient presents with increased ascites and generalized malaise.  Recently diagnosed with likely alcoholic cirrhosis, and had a prolonged admission requiring large volume paracentesis, EGD which showed esophageal varices, and treatment for SBP per patient's report.  MELD-NA 21. We will consult IR for diagnostic and therapeutic paracentesis.  Start ceftriaxone for empiric SBP coverage but also possible UTI reported from outpatient. .  AFP ordered.      Not in tx evaluation due to alcohol relapse

## 2023-09-27 NOTE — ASSESSMENT & PLAN NOTE
Body mass index is 40.55 kg/m². Morbid obesity complicates all aspects of disease management from diagnostic modalities to treatment. Weight loss encouraged and health benefits explained to patient.

## 2023-09-27 NOTE — ASSESSMENT & PLAN NOTE
38 year old woman with decompensated alcoholic cirrhosis due to ascites. She has known esophageal varices and significant psychosocial history associated with her alcohol use. Good support system at home. Meld 3.0 of 25 on admission, 24 today. Anemia workup with macrocytic anemia, normal iron, B9, B12. Only previous abdominal surgery is gastric sleeve in 2013. Has attended rehab and been associated with AA in the past. She has good insight to her current situation. Liver doppler with proper directional flow. CXR negative for infection with b/l pleural effusions and bibasilar atelectasis. Ceruloplasmin 14, IgG 2274. On ceftriaxone for UTI. No SBP s/p paracentesis with 3.3L removed. BC NGTD.    Recommendations:  - Follow up addiction psych recommendations  - Pending autoimmune labs: Anti-mitochondrial, anti-SM, a1 antitrypsin  - Therapeutic paracentesis tomorrow am  - Albumin 25g TID today  - Start lasix 40mg and Spironolactone 100mg  - Continue ceftriaxone, pending urine cultures  - 24 hour urine copper due to low ceruloplasmin   - Continue lactulose

## 2023-09-28 LAB
ABO + RH BLD: NORMAL
ALBUMIN SERPL BCP-MCNC: 2.3 G/DL (ref 3.5–5.2)
ALP SERPL-CCNC: 35 U/L (ref 55–135)
ALT SERPL W/O P-5'-P-CCNC: 12 U/L (ref 10–44)
ANION GAP SERPL CALC-SCNC: 5 MMOL/L (ref 8–16)
ANISOCYTOSIS BLD QL SMEAR: SLIGHT
AST SERPL-CCNC: 44 U/L (ref 10–40)
BACTERIA UR CULT: ABNORMAL
BASOPHILS NFR BLD: 1 % (ref 0–1.9)
BILIRUB SERPL-MCNC: 3.2 MG/DL (ref 0.1–1)
BLD GP AB SCN CELLS X3 SERPL QL: NORMAL
BLD PROD TYP BPU: NORMAL
BLOOD UNIT EXPIRATION DATE: NORMAL
BLOOD UNIT TYPE CODE: 9500
BLOOD UNIT TYPE: NORMAL
BUN SERPL-MCNC: 11 MG/DL (ref 6–20)
CALCIUM SERPL-MCNC: 7.9 MG/DL (ref 8.7–10.5)
CHLORIDE SERPL-SCNC: 105 MMOL/L (ref 95–110)
CO2 SERPL-SCNC: 24 MMOL/L (ref 23–29)
CODING SYSTEM: NORMAL
CREAT SERPL-MCNC: 1 MG/DL (ref 0.5–1.4)
CROSSMATCH INTERPRETATION: NORMAL
DIFFERENTIAL METHOD: ABNORMAL
DISPENSE STATUS: NORMAL
EOSINOPHIL NFR BLD: 3 % (ref 0–8)
ERYTHROCYTE [DISTWIDTH] IN BLOOD BY AUTOMATED COUNT: 16.8 % (ref 11.5–14.5)
EST. GFR  (NO RACE VARIABLE): >60 ML/MIN/1.73 M^2
GLUCOSE SERPL-MCNC: 87 MG/DL (ref 70–110)
HCT VFR BLD AUTO: 20.8 % (ref 37–48.5)
HGB BLD-MCNC: 6.6 G/DL (ref 12–16)
HGB BLD-MCNC: 8.8 G/DL (ref 12–16)
HYPOCHROMIA BLD QL SMEAR: ABNORMAL
IMM GRANULOCYTES # BLD AUTO: ABNORMAL K/UL (ref 0–0.04)
IMM GRANULOCYTES NFR BLD AUTO: ABNORMAL % (ref 0–0.5)
INR PPP: 2.5 (ref 0.8–1.2)
LYMPHOCYTES NFR BLD: 47 % (ref 18–48)
MCH RBC QN AUTO: 35.1 PG (ref 27–31)
MCHC RBC AUTO-ENTMCNC: 31.7 G/DL (ref 32–36)
MCV RBC AUTO: 111 FL (ref 82–98)
MITOCHONDRIA AB TITR SER IF: NORMAL {TITER}
MONOCYTES NFR BLD: 12 % (ref 4–15)
NEUTROPHILS NFR BLD: 37 % (ref 38–73)
NRBC BLD-RTO: 0 /100 WBC
OVALOCYTES BLD QL SMEAR: ABNORMAL
PLATELET # BLD AUTO: 62 K/UL (ref 150–450)
PLATELET BLD QL SMEAR: ABNORMAL
PMV BLD AUTO: 10.8 FL (ref 9.2–12.9)
POIKILOCYTOSIS BLD QL SMEAR: SLIGHT
POTASSIUM SERPL-SCNC: 4 MMOL/L (ref 3.5–5.1)
PROT SERPL-MCNC: 5.7 G/DL (ref 6–8.4)
PROTHROMBIN TIME: 25 SEC (ref 9–12.5)
RBC # BLD AUTO: 1.88 M/UL (ref 4–5.4)
SMOOTH MUSCLE AB TITR SER IF: NORMAL {TITER}
SMUDGE CELLS BLD QL SMEAR: PRESENT
SODIUM SERPL-SCNC: 134 MMOL/L (ref 136–145)
SPECIMEN OUTDATE: NORMAL
TRANS ERYTHROCYTES VOL PATIENT: NORMAL ML
WBC # BLD AUTO: 1.99 K/UL (ref 3.9–12.7)

## 2023-09-28 PROCEDURE — 85610 PROTHROMBIN TIME: CPT | Performed by: STUDENT IN AN ORGANIZED HEALTH CARE EDUCATION/TRAINING PROGRAM

## 2023-09-28 PROCEDURE — 85007 BL SMEAR W/DIFF WBC COUNT: CPT | Performed by: HOSPITALIST

## 2023-09-28 PROCEDURE — 20600001 HC STEP DOWN PRIVATE ROOM

## 2023-09-28 PROCEDURE — 63600175 PHARM REV CODE 636 W HCPCS: Performed by: STUDENT IN AN ORGANIZED HEALTH CARE EDUCATION/TRAINING PROGRAM

## 2023-09-28 PROCEDURE — P9021 RED BLOOD CELLS UNIT: HCPCS | Performed by: STUDENT IN AN ORGANIZED HEALTH CARE EDUCATION/TRAINING PROGRAM

## 2023-09-28 PROCEDURE — 85027 COMPLETE CBC AUTOMATED: CPT | Performed by: HOSPITALIST

## 2023-09-28 PROCEDURE — 86900 BLOOD TYPING SEROLOGIC ABO: CPT | Performed by: STUDENT IN AN ORGANIZED HEALTH CARE EDUCATION/TRAINING PROGRAM

## 2023-09-28 PROCEDURE — 25000003 PHARM REV CODE 250: Performed by: HOSPITALIST

## 2023-09-28 PROCEDURE — 25000003 PHARM REV CODE 250: Performed by: INTERNAL MEDICINE

## 2023-09-28 PROCEDURE — 86920 COMPATIBILITY TEST SPIN: CPT | Performed by: STUDENT IN AN ORGANIZED HEALTH CARE EDUCATION/TRAINING PROGRAM

## 2023-09-28 PROCEDURE — 99233 PR SUBSEQUENT HOSPITAL CARE,LEVL III: ICD-10-PCS | Mod: ,,, | Performed by: STUDENT IN AN ORGANIZED HEALTH CARE EDUCATION/TRAINING PROGRAM

## 2023-09-28 PROCEDURE — 99233 SBSQ HOSP IP/OBS HIGH 50: CPT | Mod: ,,, | Performed by: STUDENT IN AN ORGANIZED HEALTH CARE EDUCATION/TRAINING PROGRAM

## 2023-09-28 PROCEDURE — 36415 COLL VENOUS BLD VENIPUNCTURE: CPT | Performed by: STUDENT IN AN ORGANIZED HEALTH CARE EDUCATION/TRAINING PROGRAM

## 2023-09-28 PROCEDURE — 36430 TRANSFUSION BLD/BLD COMPNT: CPT

## 2023-09-28 PROCEDURE — 25000003 PHARM REV CODE 250: Performed by: STUDENT IN AN ORGANIZED HEALTH CARE EDUCATION/TRAINING PROGRAM

## 2023-09-28 PROCEDURE — 80053 COMPREHEN METABOLIC PANEL: CPT | Performed by: HOSPITALIST

## 2023-09-28 PROCEDURE — 85018 HEMOGLOBIN: CPT | Performed by: STUDENT IN AN ORGANIZED HEALTH CARE EDUCATION/TRAINING PROGRAM

## 2023-09-28 RX ORDER — HYDROCODONE BITARTRATE AND ACETAMINOPHEN 500; 5 MG/1; MG/1
TABLET ORAL
Status: DISCONTINUED | OUTPATIENT
Start: 2023-09-28 | End: 2023-09-29

## 2023-09-28 RX ADMIN — SPIRONOLACTONE 100 MG: 100 TABLET ORAL at 09:09

## 2023-09-28 RX ADMIN — LACTULOSE 15 G: 20 SOLUTION ORAL at 04:09

## 2023-09-28 RX ADMIN — LACTULOSE 15 G: 20 SOLUTION ORAL at 09:09

## 2023-09-28 RX ADMIN — CEFTRIAXONE 2 G: 2 INJECTION, POWDER, FOR SOLUTION INTRAMUSCULAR; INTRAVENOUS at 11:09

## 2023-09-28 RX ADMIN — Medication 6 MG: at 09:09

## 2023-09-28 RX ADMIN — TRAMADOL HYDROCHLORIDE 50 MG: 50 TABLET, COATED ORAL at 09:09

## 2023-09-28 RX ADMIN — FUROSEMIDE 40 MG: 40 TABLET ORAL at 09:09

## 2023-09-28 RX ADMIN — PANTOPRAZOLE SODIUM 40 MG: 40 TABLET, DELAYED RELEASE ORAL at 04:09

## 2023-09-28 RX ADMIN — SENNOSIDES AND DOCUSATE SODIUM 1 TABLET: 50; 8.6 TABLET ORAL at 09:09

## 2023-09-28 RX ADMIN — PANTOPRAZOLE SODIUM 40 MG: 40 TABLET, DELAYED RELEASE ORAL at 05:09

## 2023-09-28 RX ADMIN — ONDANSETRON 8 MG: 8 TABLET, ORALLY DISINTEGRATING ORAL at 02:09

## 2023-09-28 RX ADMIN — SIMETHICONE 80 MG: 80 TABLET, CHEWABLE ORAL at 09:09

## 2023-09-28 NOTE — PROGRESS NOTES
[facility] - Telemetry [facility] Medicine  Progress Note    Patient Name: Litzy Amato  MRN: [id number]  Patient Class: IP- Inpatient   Admission Date: 9/25/2023  Length of Stay: 3 days  Attending Physician: Mellissa Laurent MD  Primary Care Provider: Denise, Primary Doctor        Subjective:     Principal Problem:Decompensated hepatic cirrhosis        HPI:  Litzy Amato is a 38 y.o. female with history of alcohol abuse and recently diagnosed alcoholic cirrhosis who presents today with abdominal distention    She provides a surprisingly thorough history.  She reports that on September night, she was admitted at [facility] where she was diagnosed with alcoholic cirrhosis.  Underwent large volume paracentesis, received albumin, and received 2 units RBC transfusion for anemia.  She was started on antibiotics for SBP She states that she underwent EGD, which showed esophageal varices but were not bleeding.  She was placed on nadolol, Protonix, and Macrobid?, with which she reports compliance.  She was discharged after a week, and outpatient GI appointments were made.  Unfortunately, she states that her abdominal distention quickly recurred, and she was evaluated by her PCP who noted her to be hypotensive and appearing unwell in clinic, therefore she was referred to the ED. currently, she complains of abdominal pain due to distention.  She has recently had hematochezia with intermittent melena with each bowel movement.  She does report generalized weakness and fatigue but denies any fever, nausea, or vomiting.  She is had no hematemesis.  She denies any OTC med use at this time.  She reports that her last drink of alcohol was September 1st.      Overview/Hospital Course:  No notes on file    Interval History: Patient refused therapeutic paracentesis today, states she had a lot of abdominal pain last time and does not want to get another para. Hb dropped to 6.6 with am labs. Patient reports no  active bleeds or melena. However has hx of bleeding hemorrhoids. Ordered 1uPc. Consent obtained.     Review of Systems  Objective:     Vital Signs (Most Recent):  Temp: 98.2 °F (36.8 °C) (09/28/23 1130)  Pulse: 74 (09/28/23 1130)  Resp: 18 (09/28/23 0933)  BP: (!) 102/55 (09/28/23 1130)  SpO2: 97 % (09/28/23 1130) Vital Signs (24h Range):  Temp:  [98 °F (36.7 °C)-98.4 °F (36.9 °C)] 98.2 °F (36.8 °C)  Pulse:  [74-84] 74  Resp:  [17-19] 18  SpO2:  [93 %-97 %] 97 %  BP: ()/(53-64) 102/55     Weight: 128.2 kg (282 lb 10.1 oz)  Body mass index is 40.55 kg/m².    Intake/Output Summary (Last 24 hours) at 9/28/2023 1245  Last data filed at 9/27/2023 2100  Gross per 24 hour   Intake 300 ml   Output 1000 ml   Net -700 ml         Physical Exam  Vitals reviewed.   Constitutional:       General: She is not in acute distress.     Appearance: She is obese. She is not toxic-appearing.   HENT:      Head: Normocephalic and atraumatic.   Eyes:      General: Scleral icterus present.      Extraocular Movements: Extraocular movements intact.   Cardiovascular:      Rate and Rhythm: Normal rate and regular rhythm.      Heart sounds: Normal heart sounds.   Pulmonary:      Effort: No respiratory distress.      Breath sounds: Normal breath sounds.   Abdominal:      General: There is distension.      Tenderness: There is abdominal tenderness. There is no guarding or rebound.   Musculoskeletal:         General: Swelling present.      Cervical back: Neck supple.      Right lower leg: Edema present.      Left lower leg: Edema present.      Comments: Dependent 2-3+ pitting edema abdomen, legs   Skin:     General: Skin is warm and dry.      Coloration: Skin is jaundiced.      Findings: Bruising present.      Comments: Angiomas on the chest   Neurological:      General: No focal deficit present.      Mental Status: She is alert and oriented to person, place, and time.      Comments: No asterixis   Psychiatric:         Mood and Affect: Mood  normal.         Behavior: Behavior normal.         Thought Content: Thought content normal.         Judgment: Judgment normal.               Assessment/Plan:      * Decompensated hepatic cirrhosis  Patient presents with increased ascites and generalized malaise.  Recently diagnosed with likely alcoholic cirrhosis, and had a prolonged admission requiring large volume paracentesis, EGD which showed esophageal varices, and treatment for SBP per patient's report.  MELD-NA 21. We will consult IR for diagnostic and therapeutic paracentesis.  Start ceftriaxone for empiric SBP coverage but also possible UTI reported from outpatient. .  AFP ordered.      Not in tx evaluation due to alcohol relapse      Severe obesity (BMI >= 40)  Body mass index is 40.55 kg/m². Morbid obesity complicates all aspects of disease management from diagnostic modalities to treatment. Weight loss encouraged and health benefits explained to patient.         Alcohol use disorder, severe, dependence  Patient reports heavy alcohol abuse for at least the past 3 years, with her last drink September 1st.  Would advise continued cessation.      Hyponatremia  Mild, and asymptomatic.  Likely secondary to liver disease.  Will monitor.    Hyperbilirubinemia  Likely due to cirrhosis versus infection.  Appreciate GI recommendations.      Acute on chronic blood loss anemia  Patient reports priscilla hematochezia and intermittent melena with each bowel movement over the past couple of weeks.  Recently required blood transfusion at outside hospital. Given reported esophageal varices, started b.i.d. PPI.    Hb noted to be 6.6 on 9/28. Denies obvious bleeds or melena at this time.  Ordered 1 unit pRBC. Consent obtained.   Monitor HnH. Goal for transfusion Hb <7    Thrombocytopenia  Likely secondary to cirrhosis.  Reports active hematochezia, however this is chronic and she is not hemodynamically unstable. Suspect hemorrhoids. We will monitor.    Ascites due to alcoholic  cirrhosis  As above, we will consult IR for diagnostic and therapeutic paracentesis.      VTE Risk Mitigation (From admission, onward)         Ordered     Reason for No Pharmacological VTE Prophylaxis  Once        Question Answer Comment   Reasons: Active Bleeding    Reasons: Risk of Bleeding        09/26/23 0056     IP VTE HIGH RISK PATIENT  Once         09/26/23 0056     Place sequential compression device  Until discontinued         09/26/23 0056                Discharge Planning   MUKUND: 9/29/2023     Code Status: Full Code   Is the patient medically ready for discharge?:     Reason for patient still in hospital (select all that apply): Patient trending condition  Discharge Plan A: Home with family                  Mellissa Laurent MD  Department of Hospital Medicine   [facility] - Telemetry Stepdown

## 2023-09-28 NOTE — SUBJECTIVE & OBJECTIVE
Interval History: Patient refused therapeutic paracentesis today, states she had a lot of abdominal pain last time and does not want to get another para. Hb dropped to 6.6 with am labs. Patient reports no active bleeds or melena. However has hx of bleeding hemorrhoids. [facility] obtained.     Review of Systems  Objective:     Vital Signs (Most Recent):  Temp: 98.2 °F (36.8 °C) (09/28/23 1130)  Pulse: 74 (09/28/23 1130)  Resp: 18 (09/28/23 0933)  BP: (!) 102/55 (09/28/23 1130)  SpO2: 97 % (09/28/23 1130) Vital Signs (24h Range):  Temp:  [98 °F (36.7 °C)-98.4 °F (36.9 °C)] 98.2 °F (36.8 °C)  Pulse:  [74-84] 74  Resp:  [17-19] 18  SpO2:  [93 %-97 %] 97 %  BP: ()/(53-64) 102/55     Weight: 128.2 kg (282 lb 10.1 oz)  Body mass index is 40.55 kg/m².    Intake/Output Summary (Last 24 hours) at 9/28/2023 1245  Last data filed at 9/27/2023 2100  Gross per 24 hour   Intake 300 ml   Output 1000 ml   Net -700 ml         Physical Exam  Vitals reviewed.   Constitutional:       General: She is not in acute distress.     Appearance: She is obese. She is not toxic-appearing.   HENT:      Head: Normocephalic and atraumatic.   Eyes:      General: Scleral icterus present.      Extraocular Movements: Extraocular movements intact.   Cardiovascular:      Rate and Rhythm: Normal rate and regular rhythm.      Heart sounds: Normal heart sounds.   Pulmonary:      Effort: No respiratory distress.      Breath sounds: Normal breath sounds.   Abdominal:      General: There is distension.      Tenderness: There is abdominal tenderness. There is no guarding or rebound.   Musculoskeletal:         General: Swelling present.      Cervical back: Neck supple.      Right lower leg: Edema present.      Left lower leg: Edema present.      Comments: Dependent 2-3+ pitting edema abdomen, legs   Skin:     General: Skin is warm and dry.      Coloration: Skin is jaundiced.      Findings: Bruising present.      Comments: Angiomas on the chest    Neurological:      General: No focal deficit present.      Mental Status: She is alert and oriented to person, place, and time.      Comments: No asterixis   Psychiatric:         Mood and Affect: Mood normal.         Behavior: Behavior normal.         Thought Content: Thought content normal.         Judgment: Judgment normal.

## 2023-09-28 NOTE — PLAN OF CARE
Problem: Adult Inpatient Plan of Care  Goal: Plan of Care Review  Outcome: Ongoing, Progressing  Goal: Patient-Specific Goal (Individualized)  Outcome: Ongoing, Progressing  Goal: Absence of Hospital-Acquired Illness or Injury  Outcome: Ongoing, Progressing  Goal: Optimal Comfort and Wellbeing  Outcome: Ongoing, Progressing  Patient vitals stable. AOX4.  24hour urine collection test started at 8.30pm 9/27. Patient complain abdominal pain. Tramadol given. Safety measures ensured. Call light within reach.

## 2023-09-28 NOTE — TREATMENT PLAN
Hepatology Treatment Plan    Litzy Amato is a 38 y.o. female admitted to hospital 9/25/2023 (Hospital Day: 4) due to Decompensated hepatic cirrhosis.     Interval History  Ms. Amato was evaluated by addiction psychology yesterday and was deemed high risk for transplant. AMANDA titer positive 1:320 with profile and serologies pending. Urine culture resulted as e.coli. Meld today is 25. Hgb 6.6, patient previously reported constipation with bright red blood per rectum wit history of hemorrhoids. She completed an EGD earlier this month with non-bleeding esophageal varices.     Objective  Temp:  [97.9 °F (36.6 °C)-98.4 °F (36.9 °C)] 98 °F (36.7 °C) (09/28 0440)  Pulse:  [72-84] 80 (09/28 0440)  BP: ()/(53-64) 102/64 (09/28 0440)  Resp:  [17-19] 17 (09/28 0440)  SpO2:  [93 %-98 %] 93 % (09/28 0440)      Laboratory    Lab Results   Component Value Date    WBC 1.99 (LL) 09/28/2023    HGB 6.6 (L) 09/28/2023    HCT 20.8 (L) 09/28/2023     (H) 09/28/2023    PLT 62 (L) 09/28/2023       Lab Results   Component Value Date     (L) 09/28/2023    K 4.0 09/28/2023     09/28/2023    CO2 24 09/28/2023    BUN 11 09/28/2023    CREATININE 1.0 09/28/2023    CALCIUM 7.9 (L) 09/28/2023       Lab Results   Component Value Date    ALBUMIN 2.3 (L) 09/28/2023    ALT 12 09/28/2023    AST 44 (H) 09/28/2023    ALKPHOS 35 (L) 09/28/2023    BILITOT 3.2 (H) 09/28/2023       Lab Results   Component Value Date    INR 2.5 (H) 09/28/2023    INR 2.2 (H) 09/27/2023    INR 2.2 (H) 09/26/2023       MELD 3.0: 25 at 9/28/2023  5:40 AM  MELD-Na: 23 at 9/28/2023  5:40 AM  Calculated from:  Serum Creatinine: 1.0 mg/dL at 9/28/2023  5:40 AM  Serum Sodium: 134 mmol/L at 9/28/2023  5:40 AM  Total Bilirubin: 3.2 mg/dL at 9/28/2023  5:40 AM  Serum Albumin: 2.3 g/dL at 9/28/2023  5:40 AM  INR(ratio): 2.5 at 9/28/2023  5:40 AM  Age at listing (hypothetical): 38 years  Sex: Female at 9/28/2023  5:40 AM      Assessment    Plan  -  Therapeutic paracentesis today  - 1u prbcs, likely bleeding due to hemorrhoids   - Follow up 24 hour copper results  - Pending AMANDA profile and serologies   - Final dose of ceftriaxone today for E. Coli UTI  - Continue lasix/buddy  - Please obtain daily CBC, BMP, LFT, INR  - Plan of care was discussed with primary team  - Will follow up outpatient with hepatology with future consideration for transplant evaluation pending abstinence from alcohol use    Thank you for involving us in the care of Litzy Amato. Please call with any additional concerns or questions.    Ankita Sosa MD, PGY-1  [facility]

## 2023-09-28 NOTE — ASSESSMENT & PLAN NOTE
Patient reports priscilla hematochezia and intermittent melena with each bowel movement over the past couple of weeks.  Recently required blood transfusion at outside hospital. Given reported esophageal varices, started b.i.d. PPI.    Hb noted to be 6.6 on 9/28. Denies obvious bleeds or melena at this time.  Ordered 1 unit pRBC. Consent obtained.   Monitor HnH. Goal for transfusion Hb <7

## 2023-09-29 LAB
ALBUMIN SERPL BCP-MCNC: 2.5 G/DL (ref 3.5–5.2)
ALP SERPL-CCNC: 42 U/L (ref 55–135)
ALT SERPL W/O P-5'-P-CCNC: 15 U/L (ref 10–44)
ANION GAP SERPL CALC-SCNC: 6 MMOL/L (ref 8–16)
ANTI SM ANTIBODY: 0.08 RATIO (ref 0–0.99)
ANTI SM/RNP ANTIBODY: 0.12 RATIO (ref 0–0.99)
ANTI-SM INTERPRETATION: NEGATIVE
ANTI-SM/RNP INTERPRETATION: NEGATIVE
ANTI-SSA ANTIBODY: 0.1 RATIO (ref 0–0.99)
ANTI-SSA INTERPRETATION: NEGATIVE
ANTI-SSB ANTIBODY: 0.1 RATIO (ref 0–0.99)
ANTI-SSB INTERPRETATION: NEGATIVE
AST SERPL-CCNC: 54 U/L (ref 10–40)
BACTERIA SPEC AEROBE CULT: NO GROWTH
BASOPHILS # BLD AUTO: 0.02 K/UL (ref 0–0.2)
BASOPHILS NFR BLD: 0.8 % (ref 0–1.9)
BILIRUB SERPL-MCNC: 5.6 MG/DL (ref 0.1–1)
BUN SERPL-MCNC: 8 MG/DL (ref 6–20)
CALCIUM SERPL-MCNC: 8.5 MG/DL (ref 8.7–10.5)
CHLORIDE SERPL-SCNC: 106 MMOL/L (ref 95–110)
CO2 SERPL-SCNC: 25 MMOL/L (ref 23–29)
CREAT SERPL-MCNC: 0.8 MG/DL (ref 0.5–1.4)
DIFFERENTIAL METHOD: ABNORMAL
DSDNA AB SER-ACNC: NORMAL [IU]/ML
EOSINOPHIL # BLD AUTO: 0.1 K/UL (ref 0–0.5)
EOSINOPHIL NFR BLD: 5.3 % (ref 0–8)
ERYTHROCYTE [DISTWIDTH] IN BLOOD BY AUTOMATED COUNT: 19.3 % (ref 11.5–14.5)
EST. GFR  (NO RACE VARIABLE): >60 ML/MIN/1.73 M^2
GLUCOSE SERPL-MCNC: 116 MG/DL (ref 70–110)
HCT VFR BLD AUTO: 21.7 % (ref 37–48.5)
HGB BLD-MCNC: 7.4 G/DL (ref 12–16)
IMM GRANULOCYTES # BLD AUTO: 0.01 K/UL (ref 0–0.04)
IMM GRANULOCYTES NFR BLD AUTO: 0.4 % (ref 0–0.5)
INR PPP: 2.2 (ref 0.8–1.2)
LYMPHOCYTES # BLD AUTO: 1 K/UL (ref 1–4.8)
LYMPHOCYTES NFR BLD: 38.5 % (ref 18–48)
MCH RBC QN AUTO: 34.9 PG (ref 27–31)
MCHC RBC AUTO-ENTMCNC: 34.1 G/DL (ref 32–36)
MCV RBC AUTO: 102 FL (ref 82–98)
MONOCYTES # BLD AUTO: 0.3 K/UL (ref 0.3–1)
MONOCYTES NFR BLD: 13 % (ref 4–15)
NEUTROPHILS # BLD AUTO: 1 K/UL (ref 1.8–7.7)
NEUTROPHILS NFR BLD: 42 % (ref 38–73)
NRBC BLD-RTO: 0 /100 WBC
PLATELET # BLD AUTO: 70 K/UL (ref 150–450)
PMV BLD AUTO: 10.6 FL (ref 9.2–12.9)
POTASSIUM SERPL-SCNC: 3.9 MMOL/L (ref 3.5–5.1)
PROT SERPL-MCNC: 7.1 G/DL (ref 6–8.4)
PROTHROMBIN TIME: 22.3 SEC (ref 9–12.5)
RBC # BLD AUTO: 2.12 M/UL (ref 4–5.4)
SODIUM SERPL-SCNC: 137 MMOL/L (ref 136–145)
WBC # BLD AUTO: 2.47 K/UL (ref 3.9–12.7)

## 2023-09-29 PROCEDURE — 25000003 PHARM REV CODE 250: Performed by: STUDENT IN AN ORGANIZED HEALTH CARE EDUCATION/TRAINING PROGRAM

## 2023-09-29 PROCEDURE — 36415 COLL VENOUS BLD VENIPUNCTURE: CPT | Performed by: STUDENT IN AN ORGANIZED HEALTH CARE EDUCATION/TRAINING PROGRAM

## 2023-09-29 PROCEDURE — 25500020 PHARM REV CODE 255: Performed by: STUDENT IN AN ORGANIZED HEALTH CARE EDUCATION/TRAINING PROGRAM

## 2023-09-29 PROCEDURE — 99233 SBSQ HOSP IP/OBS HIGH 50: CPT | Mod: ,,, | Performed by: STUDENT IN AN ORGANIZED HEALTH CARE EDUCATION/TRAINING PROGRAM

## 2023-09-29 PROCEDURE — 85025 COMPLETE CBC W/AUTO DIFF WBC: CPT | Performed by: HOSPITALIST

## 2023-09-29 PROCEDURE — 99233 SBSQ HOSP IP/OBS HIGH 50: CPT | Mod: ,,, | Performed by: INTERNAL MEDICINE

## 2023-09-29 PROCEDURE — 80053 COMPREHEN METABOLIC PANEL: CPT | Performed by: STUDENT IN AN ORGANIZED HEALTH CARE EDUCATION/TRAINING PROGRAM

## 2023-09-29 PROCEDURE — 99233 PR SUBSEQUENT HOSPITAL CARE,LEVL III: ICD-10-PCS | Mod: ,,, | Performed by: INTERNAL MEDICINE

## 2023-09-29 PROCEDURE — 25000003 PHARM REV CODE 250: Performed by: HOSPITALIST

## 2023-09-29 PROCEDURE — 85610 PROTHROMBIN TIME: CPT | Performed by: STUDENT IN AN ORGANIZED HEALTH CARE EDUCATION/TRAINING PROGRAM

## 2023-09-29 PROCEDURE — 20600001 HC STEP DOWN PRIVATE ROOM

## 2023-09-29 PROCEDURE — 25000003 PHARM REV CODE 250: Performed by: INTERNAL MEDICINE

## 2023-09-29 PROCEDURE — 99233 PR SUBSEQUENT HOSPITAL CARE,LEVL III: ICD-10-PCS | Mod: ,,, | Performed by: STUDENT IN AN ORGANIZED HEALTH CARE EDUCATION/TRAINING PROGRAM

## 2023-09-29 RX ORDER — POLYETHYLENE GLYCOL 3350 17 G/17G
17 POWDER, FOR SOLUTION ORAL DAILY
Status: DISCONTINUED | OUTPATIENT
Start: 2023-09-29 | End: 2023-10-02

## 2023-09-29 RX ADMIN — POLYETHYLENE GLYCOL 3350 17 G: 17 POWDER, FOR SOLUTION ORAL at 01:09

## 2023-09-29 RX ADMIN — TRAMADOL HYDROCHLORIDE 50 MG: 50 TABLET, COATED ORAL at 09:09

## 2023-09-29 RX ADMIN — FUROSEMIDE 40 MG: 40 TABLET ORAL at 08:09

## 2023-09-29 RX ADMIN — IOHEXOL 15 ML: 350 INJECTION, SOLUTION INTRAVENOUS at 01:09

## 2023-09-29 RX ADMIN — SPIRONOLACTONE 100 MG: 100 TABLET ORAL at 08:09

## 2023-09-29 RX ADMIN — LACTULOSE 15 G: 20 SOLUTION ORAL at 08:09

## 2023-09-29 RX ADMIN — SENNOSIDES AND DOCUSATE SODIUM 1 TABLET: 50; 8.6 TABLET ORAL at 08:09

## 2023-09-29 RX ADMIN — SENNOSIDES AND DOCUSATE SODIUM 1 TABLET: 50; 8.6 TABLET ORAL at 09:09

## 2023-09-29 RX ADMIN — PANTOPRAZOLE SODIUM 40 MG: 40 TABLET, DELAYED RELEASE ORAL at 03:09

## 2023-09-29 RX ADMIN — IOHEXOL 100 ML: 350 INJECTION, SOLUTION INTRAVENOUS at 06:09

## 2023-09-29 RX ADMIN — LACTULOSE 15 G: 20 SOLUTION ORAL at 09:09

## 2023-09-29 RX ADMIN — PANTOPRAZOLE SODIUM 40 MG: 40 TABLET, DELAYED RELEASE ORAL at 05:09

## 2023-09-29 RX ADMIN — LACTULOSE 15 G: 20 SOLUTION ORAL at 03:09

## 2023-09-29 NOTE — PROGRESS NOTES
[facility] - Telemetry [facility] Medicine  Progress Note    Patient Name: Litzy Amato  MRN: [id number]  Patient Class: IP- Inpatient   Admission Date: 9/25/2023  Length of Stay: 4 days  Attending Physician: Mellissa Laurent MD  Primary Care Provider: Denise, Primary Doctor        Subjective:     Principal Problem:Decompensated hepatic cirrhosis        HPI:  Litzy Amato is a 38 y.o. female with history of alcohol abuse and recently diagnosed alcoholic cirrhosis who presents today with abdominal distention    She provides a surprisingly thorough history.  She reports that on September night, she was admitted at [facility] where she was diagnosed with alcoholic cirrhosis.  Underwent large volume paracentesis, received albumin, and received 2 units RBC transfusion for anemia.  She was started on antibiotics for SBP She states that she underwent EGD, which showed esophageal varices but were not bleeding.  She was placed on nadolol, Protonix, and Macrobid?, with which she reports compliance.  She was discharged after a week, and outpatient GI appointments were made.  Unfortunately, she states that her abdominal distention quickly recurred, and she was evaluated by her PCP who noted her to be hypotensive and appearing unwell in clinic, therefore she was referred to the ED. currently, she complains of abdominal pain due to distention.  She has recently had hematochezia with intermittent melena with each bowel movement.  She does report generalized weakness and fatigue but denies any fever, nausea, or vomiting.  She is had no hematemesis.  She denies any OTC med use at this time.  She reports that her last drink of alcohol was September 1st.      Overview/Hospital Course:  No notes on file    Interval History:   Patient continues to reports LUQ and LLQ abd pain. Hb 8.8 s/p 1 unit pRBC, dropped to 7.4 with am labs. Will obtain a CT A/P to r/o retroperitoneal bleeds. Hepatology following.       Objective:     Vital Signs (Most Recent):  Temp: 98.3 °F (36.8 °C) (09/29/23 1215)  Pulse: 79 (09/29/23 1215)  Resp: 18 (09/29/23 1215)  BP: 123/73 (09/29/23 1215)  SpO2: 99 % (09/29/23 1215) Vital Signs (24h Range):  Temp:  [97.8 °F (36.6 °C)-99 °F (37.2 °C)] 98.3 °F (36.8 °C)  Pulse:  [79-89] 79  Resp:  [18-20] 18  SpO2:  [91 %-99 %] 99 %  BP: (101-133)/(55-73) 123/73     Weight: 128.2 kg (282 lb 10.1 oz)  Body mass index is 40.55 kg/m².    Intake/Output Summary (Last 24 hours) at 9/29/2023 1414  Last data filed at 9/28/2023 1836  Gross per 24 hour   Intake --   Output 1800 ml   Net -1800 ml           Physical Exam  Vitals reviewed.   Constitutional:       General: She is not in acute distress.     Appearance: She is obese. She is not toxic-appearing.   HENT:      Head: Normocephalic and atraumatic.   Eyes:      General: Scleral icterus present.      Extraocular Movements: Extraocular movements intact.   Cardiovascular:      Rate and Rhythm: Normal rate and regular rhythm.      Heart sounds: Normal heart sounds.   Pulmonary:      Effort: No respiratory distress.      Breath sounds: Normal breath sounds.   Abdominal:      General: There is distension.      Tenderness: There is abdominal tenderness (LUQ and LLQ). There is no guarding or rebound.   Musculoskeletal:         General: Swelling present.      Cervical back: Neck supple.      Right lower leg: Edema present.      Left lower leg: Edema present.      Comments: Dependent 2-3+ pitting edema abdomen, legs   Skin:     General: Skin is warm and dry.      Coloration: Skin is jaundiced.      Findings: Bruising present.      Comments: Angiomas on the chest   Neurological:      General: No focal deficit present.      Mental Status: She is alert and oriented to person, place, and time.      Comments: No asterixis   Psychiatric:         Mood and Affect: Mood normal.         Behavior: Behavior normal.         Thought Content: Thought content normal.         Judgment:  Judgment normal.               Assessment/Plan:      * Decompensated hepatic cirrhosis  Patient presents with increased ascites and generalized malaise.  Recently diagnosed with likely alcoholic cirrhosis, and had a prolonged admission requiring large volume paracentesis, EGD which showed esophageal varices, and treatment for SBP per patient's report.  MELD-NA 21. We will consult IR for diagnostic and therapeutic paracentesis.   Not in tx evaluation due to alcohol relapse  D/c CTX as neg for SBP  Continue lasix, Spironolactone, lactulose and miralax  Follow up 24 hour urine copper due to low ceruloplasmin   Patient will need follow up outpatient with hepatology at discharge    Severe obesity (BMI >= 40)  Body mass index is 40.55 kg/m². Morbid obesity complicates all aspects of disease management from diagnostic modalities to treatment. Weight loss encouraged and health benefits explained to patient.         Alcohol use disorder, severe, dependence  Patient reports heavy alcohol abuse for at least the past 3 years, with her last drink September 1st.  Would advise continued cessation.      Hyponatremia  Mild, and asymptomatic.  Likely secondary to liver disease.  Will monitor.    Hyperbilirubinemia  Likely due to cirrhosis versus infection.  Appreciate GI recommendations.      Acute on chronic blood loss anemia  Patient reports priscilla hematochezia and intermittent melena with each bowel movement over the past couple of weeks.  Recently required blood transfusion at outside hospital. Given reported esophageal varices, started b.i.d. PPI.    Hb noted to be 6.6 on 9/28. Denies obvious bleeds or melena at this time.  s/p 1 unit pRBC on 9/28. Consent obtained.   Obtain CT A/P to r/o retroperitoneal bleed  Monitor HnH. Goal for transfusion Hb <7    Thrombocytopenia  Likely secondary to cirrhosis.  Reports active hematochezia, however this is chronic and she is not hemodynamically unstable. Suspect hemorrhoids. We will  monitor.    Ascites due to alcoholic cirrhosis  As above, we will consult IR for diagnostic and therapeutic paracentesis.      VTE Risk Mitigation (From admission, onward)         Ordered     Reason for No Pharmacological VTE Prophylaxis  Once        Question Answer Comment   Reasons: Active Bleeding    Reasons: Risk of Bleeding        09/26/23 0056     IP VTE HIGH RISK PATIENT  Once         09/26/23 0056     Place sequential compression device  Until discontinued         09/26/23 0056                Discharge Planning   MUKUND: 10/2/2023     Code Status: Full Code   Is the patient medically ready for discharge?:     Reason for patient still in hospital (select all that apply): Patient trending condition  Discharge Plan A: Home with family                  Mellissa Laurent MD  Department of Hospital Medicine   [facility] - Telemetry Stepdown

## 2023-09-29 NOTE — PLAN OF CARE
Problem: Adult Inpatient Plan of Care  Goal: Plan of Care Review  Outcome: Ongoing, Progressing  Goal: Patient-Specific Goal (Individualized)  Outcome: Ongoing, Progressing  Goal: Absence of Hospital-Acquired Illness or Injury  Outcome: Ongoing, Progressing  Goal: Optimal Comfort and Wellbeing  Outcome: Ongoing, Progressing   Vitals signs stable. AOX4. 24hour  urine collection sample sent to laboratory. 1 unit RBC transfused on 09/28.  Tramadol given for abdominal pain.  Safety measures ensured. Call light within reach.

## 2023-09-29 NOTE — SUBJECTIVE & OBJECTIVE
Interval History:   Patient continues to reports LUQ and LLQ abd pain. Hb 8.8 s/p 1 unit pRBC, dropped to 7.4 with am labs. Will obtain a CT A/P to r/o retroperitoneal bleeds. Hepatology following.      Objective:     Vital Signs (Most Recent):  Temp: 98.3 °F (36.8 °C) (09/29/23 1215)  Pulse: 79 (09/29/23 1215)  Resp: 18 (09/29/23 1215)  BP: 123/73 (09/29/23 1215)  SpO2: 99 % (09/29/23 1215) Vital Signs (24h Range):  Temp:  [97.8 °F (36.6 °C)-99 °F (37.2 °C)] 98.3 °F (36.8 °C)  Pulse:  [79-89] 79  Resp:  [18-20] 18  SpO2:  [91 %-99 %] 99 %  BP: (101-133)/(55-73) 123/73     Weight: 128.2 kg (282 lb 10.1 oz)  Body mass index is 40.55 kg/m².    Intake/Output Summary (Last 24 hours) at 9/29/2023 1414  Last data filed at 9/28/2023 1836  Gross per 24 hour   Intake --   Output 1800 ml   Net -1800 ml           Physical Exam  Vitals reviewed.   Constitutional:       General: She is not in acute distress.     Appearance: She is obese. She is not toxic-appearing.   HENT:      Head: Normocephalic and atraumatic.   Eyes:      General: Scleral icterus present.      Extraocular Movements: Extraocular movements intact.   Cardiovascular:      Rate and Rhythm: Normal rate and regular rhythm.      Heart sounds: Normal heart sounds.   Pulmonary:      Effort: No respiratory distress.      Breath sounds: Normal breath sounds.   Abdominal:      General: There is distension.      Tenderness: There is abdominal tenderness (LUQ and LLQ). There is no guarding or rebound.   Musculoskeletal:         General: Swelling present.      Cervical back: Neck supple.      Right lower leg: Edema present.      Left lower leg: Edema present.      Comments: Dependent 2-3+ pitting edema abdomen, legs   Skin:     General: Skin is warm and dry.      Coloration: Skin is jaundiced.      Findings: Bruising present.      Comments: Angiomas on the chest   Neurological:      General: No focal deficit present.      Mental Status: She is alert and oriented to person,  place, and time.      Comments: No asterixis   Psychiatric:         Mood and Affect: Mood normal.         Behavior: Behavior normal.         Thought Content: Thought content normal.         Judgment: Judgment normal.

## 2023-09-29 NOTE — ASSESSMENT & PLAN NOTE
Patient presents with increased ascites and generalized malaise.  Recently diagnosed with likely alcoholic cirrhosis, and had a prolonged admission requiring large volume paracentesis, EGD which showed esophageal varices, and treatment for SBP per patient's report.  MELD-NA 21. We will consult IR for diagnostic and therapeutic paracentesis.   Not in tx evaluation due to alcohol relapse  D/c CTX as neg for SBP  Continue lasix, Spironolactone, lactulose and miralax  Follow up 24 hour urine copper due to low ceruloplasmin   Patient will need follow up outpatient with hepatology at discharge

## 2023-09-29 NOTE — SUBJECTIVE & OBJECTIVE
Interval History: Patient continues to report abdominal pain after paracentesis two days ago. Hgb also decreased s/p 1u prbcs. Primary team with plans for abdominal CT today. PT/INR slightly improved from yesterday. AMANDA and IgG positive, other serologies negative.     Current Facility-Administered Medications   Medication    aluminum-magnesium hydroxide-simethicone 200-200-20 mg/5 mL suspension 30 mL    bisacodyL suppository 10 mg    diphenhydrAMINE-zinc acetate 2-0.1% cream    furosemide tablet 40 mg    hydrOXYzine HCL tablet 25 mg    lactulose 20 gram/30 mL solution Soln 15 g    melatonin tablet 6 mg    methocarbamoL tablet 500 mg    naloxone 0.4 mg/mL injection 0.02 mg    ondansetron disintegrating tablet 8 mg    pantoprazole EC tablet 40 mg    polyethylene glycol packet 17 g    senna-docusate 8.6-50 mg per tablet 1 tablet    simethicone chewable tablet 80 mg    sodium chloride 0.9% flush 1-10 mL    spironolactone tablet 100 mg    traMADoL tablet 50 mg       Objective:     Vital Signs (Most Recent):  Temp: 97.8 °F (36.6 °C) (09/29/23 0753)  Pulse: 82 (09/29/23 0753)  Resp: 18 (09/29/23 0753)  BP: 133/65 (09/29/23 0753)  SpO2: (!) 91 % (09/29/23 0753) Vital Signs (24h Range):  Temp:  [97.8 °F (36.6 °C)-99 °F (37.2 °C)] 97.8 °F (36.6 °C)  Pulse:  [81-89] 82  Resp:  [18-20] 18  SpO2:  [91 %-98 %] 91 %  BP: (101-133)/(55-71) 133/65     Weight: 128.2 kg (282 lb 10.1 oz) (09/26/23 0026)  Body mass index is 40.55 kg/m².       Physical Exam  Vitals reviewed.   Constitutional:       General: She is not in acute distress.     Appearance: She is obese. She is not toxic-appearing.   Cardiovascular:      Heart sounds: Normal heart sounds.   Pulmonary:      Effort: Pulmonary effort is normal. No respiratory distress.   Abdominal:      General: There is distension.      Tenderness: There is abdominal tenderness. There is no guarding or rebound.      Comments: Pitting edema in dependent portions   Musculoskeletal:          General: Swelling present.      Cervical back: Neck supple.      Right lower leg: Edema (right > left) present.      Left lower leg: Edema present.      Comments: Dependent 2-3+ pitting edema abdomen, legs   Skin:     General: Skin is warm and dry.      Coloration: Skin is jaundiced.      Findings: Bruising present.      Comments: Angiomas on the chest   Neurological:      General: No focal deficit present.      Mental Status: She is alert and oriented to person, place, and time.      Comments: No asterixis   Psychiatric:         Mood and Affect: Mood normal.         Behavior: Behavior normal.         Thought Content: Thought content normal.         Judgment: Judgment normal.            MELD 3.0: 24 at 9/29/2023  8:26 AM  MELD-Na: 22 at 9/29/2023  8:26 AM  Calculated from:  Serum Creatinine: 1.0 mg/dL at 9/28/2023  5:40 AM  Serum Sodium: 134 mmol/L at 9/28/2023  5:40 AM  Total Bilirubin: 3.2 mg/dL at 9/28/2023  5:40 AM  Serum Albumin: 2.3 g/dL at 9/28/2023  5:40 AM  INR(ratio): 2.2 at 9/29/2023  8:26 AM  Age at listing (hypothetical): 38 years  Sex: Female at 9/29/2023  8:26 AM      Significant Labs:  CBC:   Recent Labs   Lab 09/29/23  0447   WBC 2.47*   RBC 2.12*   HGB 7.4*   HCT 21.7*   PLT 70*     CMP:   Recent Labs   Lab 09/28/23  0540   GLU 87   CALCIUM 7.9*   ALBUMIN 2.3*   PROT 5.7*   *   K 4.0   CO2 24      BUN 11   CREATININE 1.0   ALKPHOS 35*   ALT 12   AST 44*   BILITOT 3.2*     Coagulation:   Recent Labs   Lab 09/29/23  0826   INR 2.2*       Significant Imaging:  Labs: Reviewed  X-Ray: Reviewed  US: Reviewed

## 2023-09-29 NOTE — PROGRESS NOTES
[facility] - Telemetry Stepdown  Hepatology  Progress Note    Patient Name: Litzy Amato  MRN: [id number]  Admission Date: 9/25/2023  Hospital Length of Stay: 4 days  Attending Provider: Mellissa Laurent MD   Primary Care Physician: Denise, Primary Doctor  Principal Problem:Decompensated hepatic cirrhosis    Subjective:       HPI: Ms. Litzy Amato is a 38 year old female for whom hepatology is consulted for management of decompensated cirrhosis. She has a PMH significant for ETOH cirrhosis (diagnosed in approximately 2021 although she was not told she has cirrhosis until this year; associated with ascites and esophageal varices). She has a PSH significant for obesity (status post gastric sleeve in 2013). No other abdominal procedures in the past. She currently vapes, previous cigarette use was only while intoxicated. No other drug use.    Her heavy drinking started in 2019/2020 due to stress from work associated with traveling during covid with minimal exposure to friends/family. She would drink two pints of vodka per day. She checked herself into rehab in May of 2022 for 30 days and was sober until November of that year. She has had 6+ close friends/family deaths in the last few years including her  and AA sponsor which have pushed her back into alcoholism. Her last drink was 9/9. She currently lives with her parents who are both pastors. She is unemployed at this time and has no children.    On chart review, patient noted to have history of ETOH cirrhosis since at least 01/2021 with documented admission at [facility] in [place] for GI bleeding (hematemesis) attributed to portal hypertensive gastropathy seen on EGD with cirrhotic appearing liver and splenomegaly seen on US. During that admission, patient reported history of consuming vodka heavily since approximately 2020 with associated 80 pounds of unintentional weight loss; reported to be counseled on need for ETOH cessation during that  admission. She was discharged with referral to hepatology in [facility]; however unclear if this was scheduled. She is noted to have ED evaluation in 04/2022 for RUQ pain associated with recurrent binge drinking; she was given IVF and PPI.     She was recently admitted to [facility] from 9/9/2023 to 9/16/2023 for evaluation of progressive abdominal distension; records from this admission not available for review. She reports undergoing paracentesis and being given antibiotics for SBP. She was noted to have anemia and reports undergoing and EGD on 9/12 and being told she had non-bleeding esophageal varices. She was discharged on Nadolol with PCP follow-up. Following discharge, she reports recurrent abdominal distension prompting a visit with her PCP who suggested she come here for evaluation on 9/25.     She has gained 70 pounds of water weight over the last 9 months causing significant limitations in mobility and back pain.     Hospital Course:   On arrival, vital signs normal on room air. Labs notable for macrocytic anemia (Hgb 8.5) with normal B12/folate, thrombocytopenia (109), elevated INR (1.9), normal Cr, and hyperbilirubinemia (5). She was admitted to hospital medicine and hepatology consulted.        Interval History: Patient continues to report abdominal pain after paracentesis two days ago. Hgb also decreased s/p 1u prbcs. Primary team with plans for abdominal CT today. PT/INR slightly improved from yesterday. AMANDA and IgG positive, other serologies negative.     Current Facility-Administered Medications   Medication    aluminum-magnesium hydroxide-simethicone 200-200-20 mg/5 mL suspension 30 mL    bisacodyL suppository 10 mg    diphenhydrAMINE-zinc acetate 2-0.1% cream    furosemide tablet 40 mg    hydrOXYzine HCL tablet 25 mg    lactulose 20 gram/30 mL solution Soln 15 g    melatonin tablet 6 mg    methocarbamoL tablet 500 mg    naloxone 0.4 mg/mL injection 0.02 mg     ondansetron disintegrating tablet 8 mg    pantoprazole EC tablet 40 mg    polyethylene glycol packet 17 g    senna-docusate 8.6-50 mg per tablet 1 tablet    simethicone chewable tablet 80 mg    sodium chloride 0.9% flush 1-10 mL    spironolactone tablet 100 mg    traMADoL tablet 50 mg       Objective:     Vital Signs (Most Recent):  Temp: 97.8 °F (36.6 °C) (09/29/23 0753)  Pulse: 82 (09/29/23 0753)  Resp: 18 (09/29/23 0753)  BP: 133/65 (09/29/23 0753)  SpO2: (!) 91 % (09/29/23 0753) Vital Signs (24h Range):  Temp:  [97.8 °F (36.6 °C)-99 °F (37.2 °C)] 97.8 °F (36.6 °C)  Pulse:  [81-89] 82  Resp:  [18-20] 18  SpO2:  [91 %-98 %] 91 %  BP: (101-133)/(55-71) 133/65     Weight: 128.2 kg (282 lb 10.1 oz) (09/26/23 0026)  Body mass index is 40.55 kg/m².       Physical Exam  Vitals reviewed.   Constitutional:       General: She is not in acute distress.     Appearance: She is obese. She is not toxic-appearing.   Cardiovascular:      Heart sounds: Normal heart sounds.   Pulmonary:      Effort: Pulmonary effort is normal. No respiratory distress.   Abdominal:      General: There is distension.      Tenderness: There is abdominal tenderness. There is no guarding or rebound.      Comments: Pitting edema in dependent portions   Musculoskeletal:         General: Swelling present.      Cervical back: Neck supple.      Right lower leg: Edema (right > left) present.      Left lower leg: Edema present.      Comments: Dependent 2-3+ pitting edema abdomen, legs   Skin:     General: Skin is warm and dry.      Coloration: Skin is jaundiced.      Findings: Bruising present.      Comments: Angiomas on the chest   Neurological:      General: No focal deficit present.      Mental Status: She is alert and oriented to person, place, and time.      Comments: No asterixis   Psychiatric:         Mood and Affect: Mood normal.         Behavior: Behavior normal.         Thought Content: Thought content normal.         Judgment: Judgment  normal.            MELD 3.0: 24 at 9/29/2023  8:26 AM  MELD-Na: 22 at 9/29/2023  8:26 AM  Calculated from:  Serum Creatinine: 1.0 mg/dL at 9/28/2023  5:40 AM  Serum Sodium: 134 mmol/L at 9/28/2023  5:40 AM  Total Bilirubin: 3.2 mg/dL at 9/28/2023  5:40 AM  Serum Albumin: 2.3 g/dL at 9/28/2023  5:40 AM  INR(ratio): 2.2 at 9/29/2023  8:26 AM  Age at listing (hypothetical): 38 years  Sex: Female at 9/29/2023  8:26 AM      Significant Labs:  CBC:   Recent Labs   Lab 09/29/23  0447   WBC 2.47*   RBC 2.12*   HGB 7.4*   HCT 21.7*   PLT 70*     CMP:   Recent Labs   Lab 09/28/23  0540   GLU 87   CALCIUM 7.9*   ALBUMIN 2.3*   PROT 5.7*   *   K 4.0   CO2 24      BUN 11   CREATININE 1.0   ALKPHOS 35*   ALT 12   AST 44*   BILITOT 3.2*     Coagulation:   Recent Labs   Lab 09/29/23  0826   INR 2.2*       Significant Imaging:  Labs: Reviewed  X-Ray: Reviewed  US: Reviewed    Assessment/Plan:     GI  * Decompensated hepatic cirrhosis  38 year old woman with decompensated alcoholic cirrhosis due to ascites. She has known esophageal varices and significant psychosocial history associated with her alcohol use. Good support system at home. Meld 3.0 of 25 on admission. Anemia workup with macrocytic anemia, normal iron, B9, B12. Only previous abdominal surgery is gastric sleeve in 2013. Has attended rehab and been associated with AA in the past. She has good insight to her current situation. Liver doppler with proper directional flow. CXR negative for infection with b/l pleural effusions and bibasilar atelectasis. Ceruloplasmin 14, IgG 2274, AMANDA 1:320, negative serologies. No SBP s/p paracentesis with 3.3L removed. BC NGTD. Addiction psych with high risk assessment.    Recommendations:  - CT abdomen/pelvis today for abdominal pain  - Right lower extremity doppler  - Start miralax  - Continue lasix, Spironolactone, lactulose  - Follow up 24 hour urine copper due to low ceruloplasmin   - Patient will need follow up outpatient  with hepatology at discharge      Thank you for your consult. I will follow-up with patient. Please contact us if you have any additional questions.    Ankita Sosa MD  Hepatology  [facility] - Telemetry Stepdown

## 2023-09-29 NOTE — ASSESSMENT & PLAN NOTE
38 year old woman with decompensated alcoholic cirrhosis due to ascites. She has known esophageal varices and significant psychosocial history associated with her alcohol use. Good support system at home. Meld 3.0 of 25 on admission. Anemia workup with macrocytic anemia, normal iron, B9, B12. Only previous abdominal surgery is gastric sleeve in 2013. Has attended rehab and been associated with AA in the past. She has good insight to her current situation. Liver doppler with proper directional flow. CXR negative for infection with b/l pleural effusions and bibasilar atelectasis. Ceruloplasmin 14, IgG 2274, AMANDA 1:320, negative serologies. No SBP s/p paracentesis with 3.3L removed. BC NGTD. Addiction psych with high risk assessment.    Recommendations:  - CT abdomen/pelvis today for abdominal pain  - Right lower extremity doppler  - Start miralax  - Continue lasix, Spironolactone, lactulose  - Follow up 24 hour urine copper due to low ceruloplasmin   - Patient will need follow up outpatient with hepatology at discharge

## 2023-09-29 NOTE — ASSESSMENT & PLAN NOTE
Patient reports priscilla hematochezia and intermittent melena with each bowel movement over the past couple of weeks.  Recently required blood transfusion at outside hospital. Given reported esophageal varices, started b.i.d. PPI.    Hb noted to be 6.6 on 9/28. Denies obvious bleeds or melena at this time.  s/p 1 unit pRBC on 9/28. Consent obtained.   Obtain CT A/P to r/o retroperitoneal bleed  Monitor HnH. Goal for transfusion Hb <7

## 2023-09-30 LAB
ALBUMIN SERPL BCP-MCNC: 2.1 G/DL (ref 3.5–5.2)
ALP SERPL-CCNC: 39 U/L (ref 55–135)
ALT SERPL W/O P-5'-P-CCNC: 14 U/L (ref 10–44)
ANION GAP SERPL CALC-SCNC: 4 MMOL/L (ref 8–16)
AST SERPL-CCNC: 47 U/L (ref 10–40)
BASOPHILS # BLD AUTO: 0.01 K/UL (ref 0–0.2)
BASOPHILS NFR BLD: 0.4 % (ref 0–1.9)
BILIRUB SERPL-MCNC: 3.6 MG/DL (ref 0.1–1)
BUN SERPL-MCNC: 8 MG/DL (ref 6–20)
CALCIUM SERPL-MCNC: 7.9 MG/DL (ref 8.7–10.5)
CHLORIDE SERPL-SCNC: 104 MMOL/L (ref 95–110)
CLINICAL BIOCHEMIST REVIEW: NORMAL
CO2 SERPL-SCNC: 25 MMOL/L (ref 23–29)
CREAT SERPL-MCNC: 0.7 MG/DL (ref 0.5–1.4)
DIFFERENTIAL METHOD: ABNORMAL
EOSINOPHIL # BLD AUTO: 0.1 K/UL (ref 0–0.5)
EOSINOPHIL NFR BLD: 5.4 % (ref 0–8)
ERYTHROCYTE [DISTWIDTH] IN BLOOD BY AUTOMATED COUNT: 19.2 % (ref 11.5–14.5)
EST. GFR  (NO RACE VARIABLE): >60 ML/MIN/1.73 M^2
GLUCOSE SERPL-MCNC: 94 MG/DL (ref 70–110)
HCT VFR BLD AUTO: 22.5 % (ref 37–48.5)
HGB BLD-MCNC: 7.8 G/DL (ref 12–16)
IMM GRANULOCYTES # BLD AUTO: 0 K/UL (ref 0–0.04)
IMM GRANULOCYTES NFR BLD AUTO: 0 % (ref 0–0.5)
INR PPP: 2.3 (ref 0.8–1.2)
LYMPHOCYTES # BLD AUTO: 0.8 K/UL (ref 1–4.8)
LYMPHOCYTES NFR BLD: 36.2 % (ref 18–48)
MCH RBC QN AUTO: 35.3 PG (ref 27–31)
MCHC RBC AUTO-ENTMCNC: 34.7 G/DL (ref 32–36)
MCV RBC AUTO: 102 FL (ref 82–98)
MONOCYTES # BLD AUTO: 0.4 K/UL (ref 0.3–1)
MONOCYTES NFR BLD: 15.6 % (ref 4–15)
NEUTROPHILS # BLD AUTO: 1 K/UL (ref 1.8–7.7)
NEUTROPHILS NFR BLD: 42.4 % (ref 38–73)
NRBC BLD-RTO: 0 /100 WBC
PLATELET # BLD AUTO: 69 K/UL (ref 150–450)
PLPETH BLD-MCNC: 12 NG/ML
PMV BLD AUTO: 10.2 FL (ref 9.2–12.9)
POPETH BLD-MCNC: NORMAL NG/ML
POTASSIUM SERPL-SCNC: 3.7 MMOL/L (ref 3.5–5.1)
PROT SERPL-MCNC: 5.8 G/DL (ref 6–8.4)
PROTHROMBIN TIME: 23.4 SEC (ref 9–12.5)
RBC # BLD AUTO: 2.21 M/UL (ref 4–5.4)
SODIUM SERPL-SCNC: 133 MMOL/L (ref 136–145)
WBC # BLD AUTO: 2.24 K/UL (ref 3.9–12.7)

## 2023-09-30 PROCEDURE — 99232 SBSQ HOSP IP/OBS MODERATE 35: CPT | Mod: ,,, | Performed by: STUDENT IN AN ORGANIZED HEALTH CARE EDUCATION/TRAINING PROGRAM

## 2023-09-30 PROCEDURE — 99232 PR SUBSEQUENT HOSPITAL CARE,LEVL II: ICD-10-PCS | Mod: ,,, | Performed by: STUDENT IN AN ORGANIZED HEALTH CARE EDUCATION/TRAINING PROGRAM

## 2023-09-30 PROCEDURE — 85610 PROTHROMBIN TIME: CPT | Performed by: STUDENT IN AN ORGANIZED HEALTH CARE EDUCATION/TRAINING PROGRAM

## 2023-09-30 PROCEDURE — 25000003 PHARM REV CODE 250: Performed by: HOSPITALIST

## 2023-09-30 PROCEDURE — 36415 COLL VENOUS BLD VENIPUNCTURE: CPT | Performed by: STUDENT IN AN ORGANIZED HEALTH CARE EDUCATION/TRAINING PROGRAM

## 2023-09-30 PROCEDURE — 85025 COMPLETE CBC W/AUTO DIFF WBC: CPT | Performed by: HOSPITALIST

## 2023-09-30 PROCEDURE — 25000003 PHARM REV CODE 250: Performed by: INTERNAL MEDICINE

## 2023-09-30 PROCEDURE — 20600001 HC STEP DOWN PRIVATE ROOM

## 2023-09-30 PROCEDURE — 25000003 PHARM REV CODE 250: Performed by: STUDENT IN AN ORGANIZED HEALTH CARE EDUCATION/TRAINING PROGRAM

## 2023-09-30 PROCEDURE — 80053 COMPREHEN METABOLIC PANEL: CPT | Performed by: STUDENT IN AN ORGANIZED HEALTH CARE EDUCATION/TRAINING PROGRAM

## 2023-09-30 RX ADMIN — SPIRONOLACTONE 100 MG: 100 TABLET ORAL at 10:09

## 2023-09-30 RX ADMIN — LACTULOSE 15 G: 20 SOLUTION ORAL at 08:09

## 2023-09-30 RX ADMIN — POLYETHYLENE GLYCOL 3350 17 G: 17 POWDER, FOR SOLUTION ORAL at 10:09

## 2023-09-30 RX ADMIN — LACTULOSE 15 G: 20 SOLUTION ORAL at 10:09

## 2023-09-30 RX ADMIN — SENNOSIDES AND DOCUSATE SODIUM 1 TABLET: 50; 8.6 TABLET ORAL at 08:09

## 2023-09-30 RX ADMIN — SIMETHICONE 80 MG: 80 TABLET, CHEWABLE ORAL at 08:09

## 2023-09-30 RX ADMIN — TRAMADOL HYDROCHLORIDE 50 MG: 50 TABLET, COATED ORAL at 08:09

## 2023-09-30 RX ADMIN — PANTOPRAZOLE SODIUM 40 MG: 40 TABLET, DELAYED RELEASE ORAL at 06:09

## 2023-09-30 RX ADMIN — TRAMADOL HYDROCHLORIDE 50 MG: 50 TABLET, COATED ORAL at 07:09

## 2023-09-30 RX ADMIN — PANTOPRAZOLE SODIUM 40 MG: 40 TABLET, DELAYED RELEASE ORAL at 04:09

## 2023-09-30 RX ADMIN — FUROSEMIDE 40 MG: 40 TABLET ORAL at 10:09

## 2023-09-30 RX ADMIN — SENNOSIDES AND DOCUSATE SODIUM 1 TABLET: 50; 8.6 TABLET ORAL at 10:09

## 2023-09-30 RX ADMIN — LACTULOSE 15 G: 20 SOLUTION ORAL at 04:09

## 2023-09-30 NOTE — ASSESSMENT & PLAN NOTE
Patient presents with increased ascites and generalized malaise.  Recently diagnosed with likely alcoholic cirrhosis, and had a prolonged admission requiring large volume paracentesis, EGD which showed esophageal varices, and treatment for SBP per patient's report.  MELD-NA 21. We will consult IR for diagnostic and therapeutic paracentesis.   Not in tx evaluation due to alcohol relapse  D/c CTX as neg for SBP  Continue lasix, Spironolactone, lactulose and miralax  Follow up 24 hour urine copper due to low ceruloplasmin   Patient will need follow up outpatient with hepatology at discharge    CT  A/P findings suggestive portal hypertension in setting of cirrhosis, including moderate volume ascites, nodular contour liver, splenomegaly, and multiple upper abdominal collateral vessels.  Anasarca with edematous changes of the left upper abdominal wall soft tissues and underlying musculature without organized fluid collection. No acute findings.

## 2023-09-30 NOTE — ASSESSMENT & PLAN NOTE
Patient reports priscilla hematochezia and intermittent melena with each bowel movement over the past couple of weeks.  Recently required blood transfusion at outside hospital. Given reported esophageal varices, started b.i.d. PPI.    Hb noted to be 6.6 on 9/28. Denies obvious bleeds or melena at this time.  s/p 1 unit pRBC on 9/28. Consent obtained.    CT A/P w/o e/o retroperitoneal bleed  Monitor HnH. Goal for transfusion Hb <7

## 2023-09-30 NOTE — PROGRESS NOTES
[facility] - Telemetry [facility] Medicine  Progress Note    Patient Name: Litzy Amato  MRN: [id number]  Patient Class: IP- Inpatient   Admission Date: 9/25/2023  Length of Stay: 5 days  Attending Physician: Mellissa Laurent MD  Primary Care Provider: Denise, Primary Doctor        Subjective:     Principal Problem:Decompensated hepatic cirrhosis        HPI:  Litzy Amato is a 38 y.o. female with history of alcohol abuse and recently diagnosed alcoholic cirrhosis who presents today with abdominal distention    She provides a surprisingly thorough history.  She reports that on September night, she was admitted at [facility] where she was diagnosed with alcoholic cirrhosis.  Underwent large volume paracentesis, received albumin, and received 2 units RBC transfusion for anemia.  She was started on antibiotics for SBP She states that she underwent EGD, which showed esophageal varices but were not bleeding.  She was placed on nadolol, Protonix, and Macrobid?, with which she reports compliance.  She was discharged after a week, and outpatient GI appointments were made.  Unfortunately, she states that her abdominal distention quickly recurred, and she was evaluated by her PCP who noted her to be hypotensive and appearing unwell in clinic, therefore she was referred to the ED. currently, she complains of abdominal pain due to distention.  She has recently had hematochezia with intermittent melena with each bowel movement.  She does report generalized weakness and fatigue but denies any fever, nausea, or vomiting.  She is had no hematemesis.  She denies any OTC med use at this time.  She reports that her last drink of alcohol was September 1st.      Overview/Hospital Course:  No notes on file    Interval History: Hb stable at 7.8. CT A/P w/o e/o retroperitoneal hemorrhage.      Review of Systems  Objective:     Vital Signs (Most Recent):  Temp: 98.9 °F (37.2 °C) (09/30/23 1114)  Pulse: 89  (09/30/23 1114)  Resp: 18 (09/30/23 1114)  BP: (!) 108/59 (09/30/23 1114)  SpO2: (!) 94 % (09/30/23 1114) Vital Signs (24h Range):  Temp:  [96.2 °F (35.7 °C)-98.9 °F (37.2 °C)] 98.9 °F (37.2 °C)  Pulse:  [] 89  Resp:  [16-20] 18  SpO2:  [94 %-99 %] 94 %  BP: (108-126)/(56-73) 108/59     Weight: 128.2 kg (282 lb 10.1 oz)  Body mass index is 40.55 kg/m².  No intake or output data in the 24 hours ending 09/30/23 1159      Physical Exam  Vitals reviewed.   Constitutional:       General: She is not in acute distress.     Appearance: She is obese. She is not toxic-appearing.   HENT:      Head: Normocephalic and atraumatic.   Eyes:      General: Scleral icterus present.      Extraocular Movements: Extraocular movements intact.   Cardiovascular:      Rate and Rhythm: Normal rate and regular rhythm.      Heart sounds: Normal heart sounds.   Pulmonary:      Effort: No respiratory distress.      Breath sounds: Normal breath sounds.   Abdominal:      General: There is distension.      Tenderness: There is abdominal tenderness (LUQ and LLQ). There is no guarding or rebound.   Musculoskeletal:         General: Swelling present.      Cervical back: Neck supple.      Right lower leg: Edema present.      Left lower leg: Edema present.      Comments: Dependent 2-3+ pitting edema abdomen, legs   Skin:     General: Skin is warm and dry.      Coloration: Skin is jaundiced.      Findings: Bruising present.      Comments: Angiomas on the chest   Neurological:      General: No focal deficit present.      Mental Status: She is alert and oriented to person, place, and time.      Comments: No asterixis   Psychiatric:         Mood and Affect: Mood normal.         Behavior: Behavior normal.         Thought Content: Thought content normal.         Judgment: Judgment normal.               Assessment/Plan:      * Decompensated hepatic cirrhosis  Patient presents with increased ascites and generalized malaise.  Recently diagnosed with likely  alcoholic cirrhosis, and had a prolonged admission requiring large volume paracentesis, EGD which showed esophageal varices, and treatment for SBP per patient's report.  MELD-NA 21. We will consult IR for diagnostic and therapeutic paracentesis.   Not in tx evaluation due to alcohol relapse  D/c CTX as neg for SBP  Continue lasix, Spironolactone, lactulose and miralax  Follow up 24 hour urine copper due to low ceruloplasmin   Patient will need follow up outpatient with hepatology at discharge    CT  A/P findings suggestive portal hypertension in setting of cirrhosis, including moderate volume ascites, nodular contour liver, splenomegaly, and multiple upper abdominal collateral vessels.  Anasarca with edematous changes of the left upper abdominal wall soft tissues and underlying musculature without organized fluid collection. No acute findings.          Severe obesity (BMI >= 40)  Body mass index is 40.55 kg/m². Morbid obesity complicates all aspects of disease management from diagnostic modalities to treatment. Weight loss encouraged and health benefits explained to patient.         Alcohol use disorder, severe, dependence  Patient reports heavy alcohol abuse for at least the past 3 years, with her last drink September 1st.  Would advise continued cessation.      Hyponatremia  Mild, and asymptomatic.  Likely secondary to liver disease.  Will monitor.    Hyperbilirubinemia  Likely due to cirrhosis versus infection.  Appreciate GI recommendations.      Acute on chronic blood loss anemia  Patient reports priscilla hematochezia and intermittent melena with each bowel movement over the past couple of weeks.  Recently required blood transfusion at outside hospital. Given reported esophageal varices, started b.i.d. PPI.    Hb noted to be 6.6 on 9/28. Denies obvious bleeds or melena at this time.  s/p 1 unit pRBC on 9/28. Consent obtained.    CT A/P w/o e/o retroperitoneal bleed  Monitor HnH. Goal for transfusion Hb  <7    Thrombocytopenia  Likely secondary to cirrhosis.  Reports active hematochezia, however this is chronic and she is not hemodynamically unstable. Suspect hemorrhoids. We will monitor.    Ascites due to alcoholic cirrhosis  As above, we will consult IR for diagnostic and therapeutic paracentesis.      VTE Risk Mitigation (From admission, onward)         Ordered     Reason for No Pharmacological VTE Prophylaxis  Once        Question Answer Comment   Reasons: Active Bleeding    Reasons: Risk of Bleeding        09/26/23 0056     IP VTE HIGH RISK PATIENT  Once         09/26/23 0056     Place sequential compression device  Until discontinued         09/26/23 0056                Discharge Planning   MUKUND: 10/2/2023     Code Status: Full Code   Is the patient medically ready for discharge?:     Reason for patient still in hospital (select all that apply): Patient trending condition  Discharge Plan A: Home with family                  Mellissa Laurent MD  Department of Hospital Medicine   [facility] - Telemetry Stepdown

## 2023-09-30 NOTE — SUBJECTIVE & OBJECTIVE
Interval History: Hb stable at 7.8. CT A/P w/o e/o retroperitoneal hemorrhage.      Review of Systems  Objective:     Vital Signs (Most Recent):  Temp: 98.9 °F (37.2 °C) (09/30/23 1114)  Pulse: 89 (09/30/23 1114)  Resp: 18 (09/30/23 1114)  BP: (!) 108/59 (09/30/23 1114)  SpO2: (!) 94 % (09/30/23 1114) Vital Signs (24h Range):  Temp:  [96.2 °F (35.7 °C)-98.9 °F (37.2 °C)] 98.9 °F (37.2 °C)  Pulse:  [] 89  Resp:  [16-20] 18  SpO2:  [94 %-99 %] 94 %  BP: (108-126)/(56-73) 108/59     Weight: 128.2 kg (282 lb 10.1 oz)  Body mass index is 40.55 kg/m².  No intake or output data in the 24 hours ending 09/30/23 1159      Physical Exam  Vitals reviewed.   Constitutional:       General: She is not in acute distress.     Appearance: She is obese. She is not toxic-appearing.   HENT:      Head: Normocephalic and atraumatic.   Eyes:      General: Scleral icterus present.      Extraocular Movements: Extraocular movements intact.   Cardiovascular:      Rate and Rhythm: Normal rate and regular rhythm.      Heart sounds: Normal heart sounds.   Pulmonary:      Effort: No respiratory distress.      Breath sounds: Normal breath sounds.   Abdominal:      General: There is distension.      Tenderness: There is abdominal tenderness (LUQ and LLQ). There is no guarding or rebound.   Musculoskeletal:         General: Swelling present.      Cervical back: Neck supple.      Right lower leg: Edema present.      Left lower leg: Edema present.      Comments: Dependent 2-3+ pitting edema abdomen, legs   Skin:     General: Skin is warm and dry.      Coloration: Skin is jaundiced.      Findings: Bruising present.      Comments: Angiomas on the chest   Neurological:      General: No focal deficit present.      Mental Status: She is alert and oriented to person, place, and time.      Comments: No asterixis   Psychiatric:         Mood and Affect: Mood normal.         Behavior: Behavior normal.         Thought Content: Thought content normal.          Judgment: Judgment normal.

## 2023-10-01 LAB
ALBUMIN SERPL BCP-MCNC: 2 G/DL (ref 3.5–5.2)
ALP SERPL-CCNC: 42 U/L (ref 55–135)
ALT SERPL W/O P-5'-P-CCNC: 15 U/L (ref 10–44)
ANION GAP SERPL CALC-SCNC: 3 MMOL/L (ref 8–16)
ANISOCYTOSIS BLD QL SMEAR: SLIGHT
AST SERPL-CCNC: 50 U/L (ref 10–40)
BACTERIA BLD CULT: NORMAL
BACTERIA BLD CULT: NORMAL
BASOPHILS # BLD AUTO: 0.02 K/UL (ref 0–0.2)
BASOPHILS NFR BLD: 0.9 % (ref 0–1.9)
BILIRUB SERPL-MCNC: 3.5 MG/DL (ref 0.1–1)
BUN SERPL-MCNC: 7 MG/DL (ref 6–20)
CALCIUM SERPL-MCNC: 7.9 MG/DL (ref 8.7–10.5)
CHLORIDE SERPL-SCNC: 105 MMOL/L (ref 95–110)
CO2 SERPL-SCNC: 25 MMOL/L (ref 23–29)
CREAT SERPL-MCNC: 0.7 MG/DL (ref 0.5–1.4)
DIFFERENTIAL METHOD: ABNORMAL
EOSINOPHIL # BLD AUTO: 0.1 K/UL (ref 0–0.5)
EOSINOPHIL NFR BLD: 5.7 % (ref 0–8)
ERYTHROCYTE [DISTWIDTH] IN BLOOD BY AUTOMATED COUNT: 18.9 % (ref 11.5–14.5)
EST. GFR  (NO RACE VARIABLE): >60 ML/MIN/1.73 M^2
GLUCOSE SERPL-MCNC: 85 MG/DL (ref 70–110)
HCT VFR BLD AUTO: 22.1 % (ref 37–48.5)
HGB BLD-MCNC: 7.6 G/DL (ref 12–16)
IMM GRANULOCYTES # BLD AUTO: 0 K/UL (ref 0–0.04)
IMM GRANULOCYTES NFR BLD AUTO: 0 % (ref 0–0.5)
INR PPP: 2.2 (ref 0.8–1.2)
LYMPHOCYTES # BLD AUTO: 0.9 K/UL (ref 1–4.8)
LYMPHOCYTES NFR BLD: 38.7 % (ref 18–48)
MCH RBC QN AUTO: 34.7 PG (ref 27–31)
MCHC RBC AUTO-ENTMCNC: 34.4 G/DL (ref 32–36)
MCV RBC AUTO: 101 FL (ref 82–98)
MONOCYTES # BLD AUTO: 0.3 K/UL (ref 0.3–1)
MONOCYTES NFR BLD: 14.8 % (ref 4–15)
NEUTROPHILS # BLD AUTO: 0.9 K/UL (ref 1.8–7.7)
NEUTROPHILS NFR BLD: 39.9 % (ref 38–73)
NRBC BLD-RTO: 0 /100 WBC
OVALOCYTES BLD QL SMEAR: ABNORMAL
PLATELET # BLD AUTO: 71 K/UL (ref 150–450)
PLATELET BLD QL SMEAR: ABNORMAL
PMV BLD AUTO: 10.4 FL (ref 9.2–12.9)
POIKILOCYTOSIS BLD QL SMEAR: SLIGHT
POTASSIUM SERPL-SCNC: 3.8 MMOL/L (ref 3.5–5.1)
PROT SERPL-MCNC: 5.9 G/DL (ref 6–8.4)
PROTHROMBIN TIME: 22.9 SEC (ref 9–12.5)
RBC # BLD AUTO: 2.19 M/UL (ref 4–5.4)
SCHISTOCYTES BLD QL SMEAR: ABNORMAL
SODIUM SERPL-SCNC: 133 MMOL/L (ref 136–145)
WBC # BLD AUTO: 2.3 K/UL (ref 3.9–12.7)

## 2023-10-01 PROCEDURE — 25000003 PHARM REV CODE 250: Performed by: INTERNAL MEDICINE

## 2023-10-01 PROCEDURE — 99233 SBSQ HOSP IP/OBS HIGH 50: CPT | Mod: ,,, | Performed by: STUDENT IN AN ORGANIZED HEALTH CARE EDUCATION/TRAINING PROGRAM

## 2023-10-01 PROCEDURE — 25000003 PHARM REV CODE 250: Performed by: HOSPITALIST

## 2023-10-01 PROCEDURE — 36415 COLL VENOUS BLD VENIPUNCTURE: CPT | Performed by: STUDENT IN AN ORGANIZED HEALTH CARE EDUCATION/TRAINING PROGRAM

## 2023-10-01 PROCEDURE — 85610 PROTHROMBIN TIME: CPT | Performed by: STUDENT IN AN ORGANIZED HEALTH CARE EDUCATION/TRAINING PROGRAM

## 2023-10-01 PROCEDURE — 99233 SBSQ HOSP IP/OBS HIGH 50: CPT | Mod: ,,, | Performed by: INTERNAL MEDICINE

## 2023-10-01 PROCEDURE — 25000003 PHARM REV CODE 250: Performed by: STUDENT IN AN ORGANIZED HEALTH CARE EDUCATION/TRAINING PROGRAM

## 2023-10-01 PROCEDURE — 20600001 HC STEP DOWN PRIVATE ROOM

## 2023-10-01 PROCEDURE — 80053 COMPREHEN METABOLIC PANEL: CPT | Performed by: STUDENT IN AN ORGANIZED HEALTH CARE EDUCATION/TRAINING PROGRAM

## 2023-10-01 PROCEDURE — 99233 PR SUBSEQUENT HOSPITAL CARE,LEVL III: ICD-10-PCS | Mod: ,,, | Performed by: STUDENT IN AN ORGANIZED HEALTH CARE EDUCATION/TRAINING PROGRAM

## 2023-10-01 PROCEDURE — 85025 COMPLETE CBC W/AUTO DIFF WBC: CPT | Performed by: HOSPITALIST

## 2023-10-01 PROCEDURE — 99233 PR SUBSEQUENT HOSPITAL CARE,LEVL III: ICD-10-PCS | Mod: ,,, | Performed by: INTERNAL MEDICINE

## 2023-10-01 RX ADMIN — SENNOSIDES AND DOCUSATE SODIUM 1 TABLET: 50; 8.6 TABLET ORAL at 09:10

## 2023-10-01 RX ADMIN — TRAMADOL HYDROCHLORIDE 50 MG: 50 TABLET, COATED ORAL at 11:10

## 2023-10-01 RX ADMIN — TRAMADOL HYDROCHLORIDE 50 MG: 50 TABLET, COATED ORAL at 04:10

## 2023-10-01 RX ADMIN — LACTULOSE 15 G: 20 SOLUTION ORAL at 09:10

## 2023-10-01 RX ADMIN — SENNOSIDES AND DOCUSATE SODIUM 1 TABLET: 50; 8.6 TABLET ORAL at 08:10

## 2023-10-01 RX ADMIN — PANTOPRAZOLE SODIUM 40 MG: 40 TABLET, DELAYED RELEASE ORAL at 06:10

## 2023-10-01 RX ADMIN — FUROSEMIDE 40 MG: 40 TABLET ORAL at 08:10

## 2023-10-01 RX ADMIN — LACTULOSE 15 G: 20 SOLUTION ORAL at 05:10

## 2023-10-01 RX ADMIN — PANTOPRAZOLE SODIUM 40 MG: 40 TABLET, DELAYED RELEASE ORAL at 05:10

## 2023-10-01 RX ADMIN — LACTULOSE 15 G: 20 SOLUTION ORAL at 08:10

## 2023-10-01 RX ADMIN — SPIRONOLACTONE 100 MG: 100 TABLET ORAL at 08:10

## 2023-10-01 NOTE — PROGRESS NOTES
[facility] - Telemetry [facility] Medicine  Progress Note    Patient Name: Litzy Amato  MRN: [id number]  Patient Class: IP- Inpatient   Admission Date: 9/25/2023  Length of Stay: 6 days  Attending Physician: Mellissa Laurent MD  Primary Care Provider: Denise, Primary Doctor        Subjective:     Principal Problem:Decompensated hepatic cirrhosis        HPI:  Litzy Amato is a 38 y.o. female with history of alcohol abuse and recently diagnosed alcoholic cirrhosis who presents today with abdominal distention    She provides a surprisingly thorough history.  She reports that on September night, she was admitted at [facility] where she was diagnosed with alcoholic cirrhosis.  Underwent large volume paracentesis, received albumin, and received 2 units RBC transfusion for anemia.  She was started on antibiotics for SBP She states that she underwent EGD, which showed esophageal varices but were not bleeding.  She was placed on nadolol, Protonix, and Macrobid?, with which she reports compliance.  She was discharged after a week, and outpatient GI appointments were made.  Unfortunately, she states that her abdominal distention quickly recurred, and she was evaluated by her PCP who noted her to be hypotensive and appearing unwell in clinic, therefore she was referred to the ED. currently, she complains of abdominal pain due to distention.  She has recently had hematochezia with intermittent melena with each bowel movement.  She does report generalized weakness and fatigue but denies any fever, nausea, or vomiting.  She is had no hematemesis.  She denies any OTC med use at this time.  She reports that her last drink of alcohol was September 1st.      Overview/Hospital Course:  No notes on file    Interval History: Awaiting RLE USG to r/o DVT.  Hb stable. CT A/P w/o e/o retroperitoneal hemorrhage.      Review of Systems  Objective:     Vital Signs (Most Recent):  Temp: 97.9 °F (36.6 °C) (10/01/23  1115)  Pulse: 79 (10/01/23 1115)  Resp: 19 (10/01/23 1115)  BP: (!) 161/75 (10/01/23 1115)  SpO2: 96 % (10/01/23 1115) Vital Signs (24h Range):  Temp:  [97.9 °F (36.6 °C)-98.7 °F (37.1 °C)] 97.9 °F (36.6 °C)  Pulse:  [] 79  Resp:  [16-19] 19  SpO2:  [94 %-97 %] 96 %  BP: (106-161)/(56-75) 161/75     Weight: 128.2 kg (282 lb 10.1 oz)  Body mass index is 40.55 kg/m².    Intake/Output Summary (Last 24 hours) at 10/1/2023 1411  Last data filed at 9/30/2023 1825  Gross per 24 hour   Intake --   Output 800 ml   Net -800 ml         Physical Exam  Vitals reviewed.   Constitutional:       General: She is not in acute distress.     Appearance: She is obese. She is not toxic-appearing.   HENT:      Head: Normocephalic and atraumatic.   Eyes:      General: Scleral icterus present.      Extraocular Movements: Extraocular movements intact.   Cardiovascular:      Rate and Rhythm: Normal rate and regular rhythm.      Heart sounds: Normal heart sounds.   Pulmonary:      Effort: No respiratory distress.      Breath sounds: Normal breath sounds.   Abdominal:      General: There is distension.      Tenderness: There is abdominal tenderness (LUQ and LLQ). There is no guarding or rebound.   Musculoskeletal:         General: Swelling present.      Cervical back: Neck supple.      Right lower leg: Edema present.      Left lower leg: Edema present.      Comments: Dependent 2-3+ pitting edema abdomen, legs   Skin:     General: Skin is warm and dry.      Coloration: Skin is jaundiced.      Findings: Bruising present.      Comments: Angiomas on the chest   Neurological:      General: No focal deficit present.      Mental Status: She is alert and oriented to person, place, and time.      Comments: No asterixis   Psychiatric:         Mood and Affect: Mood normal.         Behavior: Behavior normal.         Thought Content: Thought content normal.         Judgment: Judgment normal.               Assessment/Plan:      * Decompensated hepatic  cirrhosis  Patient presents with increased ascites and generalized malaise.  Recently diagnosed with likely alcoholic cirrhosis, and had a prolonged admission requiring large volume paracentesis, EGD which showed esophageal varices, and treatment for SBP per patient's report.  MELD-NA 21. We will consult IR for diagnostic and therapeutic paracentesis.   Not in tx evaluation due to alcohol relapse  D/c CTX as neg for SBP  Continue lasix, Spironolactone, lactulose and miralax  Follow up 24 hour urine copper due to low ceruloplasmin   Patient will need follow up outpatient with hepatology at discharge    CT  A/P findings suggestive portal hypertension in setting of cirrhosis, including moderate volume ascites, nodular contour liver, splenomegaly, and multiple upper abdominal collateral vessels.  Anasarca with edematous changes of the left upper abdominal wall soft tissues and underlying musculature without organized fluid collection. No acute findings.          Severe obesity (BMI >= 40)  Body mass index is 40.55 kg/m². Morbid obesity complicates all aspects of disease management from diagnostic modalities to treatment. Weight loss encouraged and health benefits explained to patient.         Alcohol use disorder, severe, dependence  Patient reports heavy alcohol abuse for at least the past 3 years, with her last drink September 1st.  Would advise continued cessation.      Hyponatremia  Mild, and asymptomatic.  Likely secondary to liver disease.  Will monitor.    Hyperbilirubinemia  Likely due to cirrhosis versus infection.  Appreciate GI recommendations.      Acute on chronic blood loss anemia  Patient reports priscilla hematochezia and intermittent melena with each bowel movement over the past couple of weeks.  Recently required blood transfusion at outside hospital. Given reported esophageal varices, started b.i.d. PPI.    Hb noted to be 6.6 on 9/28. Denies obvious bleeds or melena at this time.  s/p 1 unit pRBC on 9/28.  Consent obtained.    CT A/P w/o e/o retroperitoneal bleed  Monitor HnH. Goal for transfusion Hb <7    Thrombocytopenia  Likely secondary to cirrhosis.  Reports active hematochezia, however this is chronic and she is not hemodynamically unstable. Suspect hemorrhoids. We will monitor.    Ascites due to alcoholic cirrhosis  As above, we will consult IR for diagnostic and therapeutic paracentesis.      VTE Risk Mitigation (From admission, onward)         Ordered     Reason for No Pharmacological VTE Prophylaxis  Once        Question Answer Comment   Reasons: Active Bleeding    Reasons: Risk of Bleeding        09/26/23 0056     IP VTE HIGH RISK PATIENT  Once         09/26/23 0056     Place sequential compression device  Until discontinued         09/26/23 0056                Discharge Planning   MUKUND: 10/2/2023     Code Status: Full Code   Is the patient medically ready for discharge?:     Reason for patient still in hospital (select all that apply): Patient trending condition  Discharge Plan A: Home with family                  Mellissa Laurent MD  Department of Hospital Medicine   [facility] - Telemetry Stepdown

## 2023-10-01 NOTE — PLAN OF CARE
Problem: Adult Inpatient Plan of Care  Goal: Plan of Care Review  Outcome: Ongoing, Progressing  Goal: Patient-Specific Goal (Individualized)  Outcome: Ongoing, Progressing  Goal: Absence of Hospital-Acquired Illness or Injury  Outcome: Ongoing, Progressing  Goal: Optimal Comfort and Wellbeing  Outcome: Ongoing, Progressing  Goal: Readiness for Transition of Care  Outcome: Ongoing, Progressing     Problem: Bariatric Environmental Safety  Goal: Safety Maintained with Care  Outcome: Ongoing, Progressing     Problem: Impaired Wound Healing  Goal: Optimal Wound Healing  Outcome: Ongoing, Progressing     Problem: Infection  Goal: Absence of Infection Signs and Symptoms  Outcome: Ongoing, Progressing     Pt A&Ox4 VSS. Pt complained of abdominal pain with leakage of fluid from paracentesis puncture site. Frequent dressing changed done to keep clean and dry. Routine and PRN meds given. Pt has started having good BMs after being constipated for days. POC reviewed monitoring ongoing.

## 2023-10-01 NOTE — PROGRESS NOTES
[facility] Progress Note      Attending: Mellissa Laurent MD   Admit Date: 9/25/2023  Today's Date: 10/01/2023    SUBJECTIVE:     Interval History:   Patient reports on-going left sided abdominal pain and leakage from prior paracentesis site. She reports having two bowel movements with use of Lactulose and Miralax. Labs notable for stable macrocytic anemia (Hgb 7-8 since 9/29 with PRBC last given on 9/28), stable INR (2.2 from 2.3), normal Cr, and stable hyperbilirubinemia (3.5 from 3.6). PETH on admission 12. Urine copper still in process.     Infusions:    None    Scheduled Medications:    furosemide  40 mg Oral Daily    lactulose  15 g Oral TID    pantoprazole  40 mg Oral BID AC    polyethylene glycol  17 g Oral Daily    senna-docusate 8.6-50 mg  1 tablet Oral BID    spironolactone  100 mg Oral Daily       PRN Medications:   aluminum-magnesium hydroxide-simethicone, bisacodyL, diphenhydrAMINE-zinc acetate 2-0.1%, hydrOXYzine HCL, iohexol, melatonin, methocarbamoL, naloxone, ondansetron, polyethylene glycol, simethicone, sodium chloride 0.9%, traMADoL    Review of Systems   Constitutional:  Positive for malaise/fatigue. Negative for chills, fever and weight loss.   Gastrointestinal:  Positive for abdominal pain and constipation. Negative for melena, nausea and vomiting.       OBJECTIVE:     Vital Signs Trends/Hx Reviewed  Vitals:    10/01/23 0339 10/01/23 0457 10/01/23 0749 10/01/23 1115   BP: (!) 121/56  115/65 (!) 161/75   BP Location: Right leg  Right arm Right arm   Patient Position: Lying  Lying Lying   Pulse: 88  81 79   Resp:  16 18 19   Temp: 98.1 °F (36.7 °C)  97.9 °F (36.6 °C) 97.9 °F (36.6 °C)   TempSrc: Oral  Oral Oral   SpO2: (!) 94%  95% 96%   Weight:       Height:             Physical Exam  Constitutional:       Appearance: Normal appearance. She is obese.   Eyes:      General: Scleral icterus present.   Cardiovascular:      Rate and Rhythm: Normal rate and regular rhythm.       Pulses: Normal pulses.      Heart sounds: Normal heart sounds.   Pulmonary:      Effort: Pulmonary effort is normal. No respiratory distress.      Breath sounds: Normal breath sounds.   Abdominal:      General: Bowel sounds are normal. There is distension.      Palpations: Abdomen is soft.      Tenderness: There is abdominal tenderness in the left lower quadrant.      Comments: Clean dressing over left lower abdomen.    Musculoskeletal:      Right lower leg: Edema present.      Left lower leg: Edema present.   Skin:     General: Skin is warm and dry.      Coloration: Skin is not jaundiced.   Neurological:      Mental Status: She is alert and oriented to person, place, and time.          Laboratory:  Lab results in last 24 hours reviewed.     MELD 3.0: 25 at 10/1/2023  4:23 AM  MELD-Na: 23 at 10/1/2023  4:23 AM  Calculated from:  Serum Creatinine: 0.7 mg/dL (Using min of 1 mg/dL) at 10/1/2023  4:23 AM  Serum Sodium: 133 mmol/L at 10/1/2023  4:23 AM  Total Bilirubin: 3.5 mg/dL at 10/1/2023  4:23 AM  Serum Albumin: 2.0 g/dL at 10/1/2023  4:23 AM  INR(ratio): 2.2 at 10/1/2023  4:23 AM  Age at listing (hypothetical): 38 years  Sex: Female at 10/1/2023  4:23 AM      ASSESSMENT & RECOMMENDATIONS     This is a 38 year old female with PMH significant for ETOH cirrhosis (diagnosed in approximately 2021 although patient denies knowledge of liver disease until 9/2023; associated with continued ETOH usage as of 9/2023 despite completion of AA/rehab in 5/2022: PETH 12 on admission) and obesity (status post gastric sleeve in 2013) who was admitted to [facility] on 9/25 for management of decompensated cirrhosis (ascites) following reported admission at [facility] from 9/6 to 9/16 for decompensated cirrhosis associated with reported SBP and GI bleeding (hematemesis) with small non-bleeding EV reportedly seen on EGD. MELD 24-25 on admission here. She is status post paracentesis on 9/26 that was negative for SBP. Chronic liver  disease work-up thus far significant for borderline low ceruloplasmin; urine copper assessment in process to rule out Favian's disease. Evaluated by addiction psychiatry on 9/26 and deemed high risk for transplant from their perspective. MELD remains stable at 24-25 since admission. She is noted to have on-going leakage and pain at paracentesis site from 9/26; CT A/P on 9/29 notable for pronounced edema of left abdominal wall that may be contributing.     Problem List:   #1. ETOH cirrhosis  #2. Decompensated hepatic cirrhosis  #3. Obesity    Recommendations:     -Lasix 40 and Spironolactone 100 for ascites management.   -Low sodium diet.   -Lactulose TID and MIRALAX BID for encephalopathy prevention; titrate Lactulose to 3 bowel movements daily.   -Follow-up urine copper.   -CMP and INR daily.   -Avoid use of medications that may precipitate encephalopathy (e.g. opioids and benzo's).   -No plans for initiation of transplant evaluation currently given patient needing to demonstrate commitment to sobriety by participating in structured program. Will arrange follow-up with hepatology clinic.     Thank you for allowing us to participate in the care of this patient. Please call with questions.        Niranjan Corcoran MD, PGY-VI  Gastroenterology Fellow  [facility]

## 2023-10-01 NOTE — SUBJECTIVE & OBJECTIVE
Interval History: Awaiting RLE USG to r/o DVT.  Hb stable. CT A/P w/o e/o retroperitoneal hemorrhage.      Review of Systems  Objective:     Vital Signs (Most Recent):  Temp: 97.9 °F (36.6 °C) (10/01/23 1115)  Pulse: 79 (10/01/23 1115)  Resp: 19 (10/01/23 1115)  BP: (!) 161/75 (10/01/23 1115)  SpO2: 96 % (10/01/23 1115) Vital Signs (24h Range):  Temp:  [97.9 °F (36.6 °C)-98.7 °F (37.1 °C)] 97.9 °F (36.6 °C)  Pulse:  [] 79  Resp:  [16-19] 19  SpO2:  [94 %-97 %] 96 %  BP: (106-161)/(56-75) 161/75     Weight: 128.2 kg (282 lb 10.1 oz)  Body mass index is 40.55 kg/m².    Intake/Output Summary (Last 24 hours) at 10/1/2023 1411  Last data filed at 9/30/2023 1825  Gross per 24 hour   Intake --   Output 800 ml   Net -800 ml         Physical Exam  Vitals reviewed.   Constitutional:       General: She is not in acute distress.     Appearance: She is obese. She is not toxic-appearing.   HENT:      Head: Normocephalic and atraumatic.   Eyes:      General: Scleral icterus present.      Extraocular Movements: Extraocular movements intact.   Cardiovascular:      Rate and Rhythm: Normal rate and regular rhythm.      Heart sounds: Normal heart sounds.   Pulmonary:      Effort: No respiratory distress.      Breath sounds: Normal breath sounds.   Abdominal:      General: There is distension.      Tenderness: There is abdominal tenderness (LUQ and LLQ). There is no guarding or rebound.   Musculoskeletal:         General: Swelling present.      Cervical back: Neck supple.      Right lower leg: Edema present.      Left lower leg: Edema present.      Comments: Dependent 2-3+ pitting edema abdomen, legs   Skin:     General: Skin is warm and dry.      Coloration: Skin is jaundiced.      Findings: Bruising present.      Comments: Angiomas on the chest   Neurological:      General: No focal deficit present.      Mental Status: She is alert and oriented to person, place, and time.      Comments: No asterixis   Psychiatric:         Mood  and Affect: Mood normal.         Behavior: Behavior normal.         Thought Content: Thought content normal.         Judgment: Judgment normal.

## 2023-10-02 ENCOUNTER — HOSPITAL ENCOUNTER (OUTPATIENT)
Dept: CARDIOLOGY | Facility: HOSPITAL | Age: 38
Discharge: HOME | DRG: 433 | End: 2023-10-02
Attending: STUDENT IN AN ORGANIZED HEALTH CARE EDUCATION/TRAINING PROGRAM | Admitting: EMERGENCY MEDICINE
Payer: MEDICAID

## 2023-10-02 VITALS
DIASTOLIC BLOOD PRESSURE: 59 MMHG | SYSTOLIC BLOOD PRESSURE: 107 MMHG | WEIGHT: 282.63 LBS | HEIGHT: 70 IN | OXYGEN SATURATION: 97 % | TEMPERATURE: 99 F | HEART RATE: 86 BPM | RESPIRATION RATE: 19 BRPM | BODY MASS INDEX: 40.46 KG/M2

## 2023-10-02 DIAGNOSIS — K70.30 ALCOHOLIC CIRRHOSIS, UNSPECIFIED WHETHER ASCITES PRESENT: Primary | ICD-10-CM

## 2023-10-02 LAB
ALBUMIN SERPL BCP-MCNC: 2.1 G/DL (ref 3.5–5.2)
ALP SERPL-CCNC: 44 U/L (ref 55–135)
ALT SERPL W/O P-5'-P-CCNC: 15 U/L (ref 10–44)
ANION GAP SERPL CALC-SCNC: 5 MMOL/L (ref 8–16)
AST SERPL-CCNC: 49 U/L (ref 10–40)
BASOPHILS # BLD AUTO: 0.02 K/UL (ref 0–0.2)
BASOPHILS NFR BLD: 0.9 % (ref 0–1.9)
BILIRUB SERPL-MCNC: 3.7 MG/DL (ref 0.1–1)
BUN SERPL-MCNC: 7 MG/DL (ref 6–20)
CALCIUM SERPL-MCNC: 8 MG/DL (ref 8.7–10.5)
CHLORIDE SERPL-SCNC: 102 MMOL/L (ref 95–110)
CO2 SERPL-SCNC: 26 MMOL/L (ref 23–29)
COPPER URINE COLLECTION DURATION: 24 H
COPPER URINE VOLUME: 3220 ML
COPPER, 24 HOUR URINE: 86 MCG/24 H (ref 9–71)
CREAT SERPL-MCNC: 0.8 MG/DL (ref 0.5–1.4)
DIFFERENTIAL METHOD: ABNORMAL
EOSINOPHIL # BLD AUTO: 0.1 K/UL (ref 0–0.5)
EOSINOPHIL NFR BLD: 4.7 % (ref 0–8)
ERYTHROCYTE [DISTWIDTH] IN BLOOD BY AUTOMATED COUNT: 18.6 % (ref 11.5–14.5)
EST. GFR  (NO RACE VARIABLE): >60 ML/MIN/1.73 M^2
GLUCOSE SERPL-MCNC: 82 MG/DL (ref 70–110)
HCT VFR BLD AUTO: 23.4 % (ref 37–48.5)
HGB BLD-MCNC: 7.8 G/DL (ref 12–16)
IMM GRANULOCYTES # BLD AUTO: 0 K/UL (ref 0–0.04)
IMM GRANULOCYTES NFR BLD AUTO: 0 % (ref 0–0.5)
INR PPP: 2.2 (ref 0.8–1.2)
LYMPHOCYTES # BLD AUTO: 0.8 K/UL (ref 1–4.8)
LYMPHOCYTES NFR BLD: 35.8 % (ref 18–48)
MCH RBC QN AUTO: 35.3 PG (ref 27–31)
MCHC RBC AUTO-ENTMCNC: 33.3 G/DL (ref 32–36)
MCV RBC AUTO: 106 FL (ref 82–98)
MONOCYTES # BLD AUTO: 0.3 K/UL (ref 0.3–1)
MONOCYTES NFR BLD: 14.7 % (ref 4–15)
NEUTROPHILS # BLD AUTO: 1 K/UL (ref 1.8–7.7)
NEUTROPHILS NFR BLD: 43.9 % (ref 38–73)
NRBC BLD-RTO: 0 /100 WBC
PLATELET # BLD AUTO: 73 K/UL (ref 150–450)
PMV BLD AUTO: 10.4 FL (ref 9.2–12.9)
POTASSIUM SERPL-SCNC: 3.8 MMOL/L (ref 3.5–5.1)
PROT SERPL-MCNC: 6 G/DL (ref 6–8.4)
PROTHROMBIN TIME: 22.1 SEC (ref 9–12.5)
RBC # BLD AUTO: 2.21 M/UL (ref 4–5.4)
SODIUM SERPL-SCNC: 133 MMOL/L (ref 136–145)
WBC # BLD AUTO: 2.32 K/UL (ref 3.9–12.7)

## 2023-10-02 PROCEDURE — 36415 COLL VENOUS BLD VENIPUNCTURE: CPT | Performed by: STUDENT IN AN ORGANIZED HEALTH CARE EDUCATION/TRAINING PROGRAM

## 2023-10-02 PROCEDURE — 80053 COMPREHEN METABOLIC PANEL: CPT | Performed by: STUDENT IN AN ORGANIZED HEALTH CARE EDUCATION/TRAINING PROGRAM

## 2023-10-02 PROCEDURE — 93971 CV US DOPPLER VENOUS LEG RIGHT (CUPID ONLY): ICD-10-PCS | Mod: 26,RT,, | Performed by: INTERNAL MEDICINE

## 2023-10-02 PROCEDURE — 25000003 PHARM REV CODE 250: Performed by: STUDENT IN AN ORGANIZED HEALTH CARE EDUCATION/TRAINING PROGRAM

## 2023-10-02 PROCEDURE — 93971 EXTREMITY STUDY: CPT | Mod: RT

## 2023-10-02 PROCEDURE — 99239 PR HOSPITAL DISCHARGE DAY,>30 MIN: ICD-10-PCS | Mod: ,,, | Performed by: STUDENT IN AN ORGANIZED HEALTH CARE EDUCATION/TRAINING PROGRAM

## 2023-10-02 PROCEDURE — 93971 EXTREMITY STUDY: CPT | Mod: 26,RT,, | Performed by: INTERNAL MEDICINE

## 2023-10-02 PROCEDURE — 25000003 PHARM REV CODE 250: Performed by: HOSPITALIST

## 2023-10-02 PROCEDURE — 99239 HOSP IP/OBS DSCHRG MGMT >30: CPT | Mod: ,,, | Performed by: STUDENT IN AN ORGANIZED HEALTH CARE EDUCATION/TRAINING PROGRAM

## 2023-10-02 PROCEDURE — 85025 COMPLETE CBC W/AUTO DIFF WBC: CPT | Performed by: HOSPITALIST

## 2023-10-02 PROCEDURE — 85610 PROTHROMBIN TIME: CPT | Performed by: STUDENT IN AN ORGANIZED HEALTH CARE EDUCATION/TRAINING PROGRAM

## 2023-10-02 RX ORDER — POLYETHYLENE GLYCOL 3350 17 G/17G
17 POWDER, FOR SOLUTION ORAL DAILY PRN
Qty: 510 G | Refills: 0 | Status: SHIPPED | OUTPATIENT
Start: 2023-10-02

## 2023-10-02 RX ORDER — POLYETHYLENE GLYCOL 3350 17 G/17G
17 POWDER, FOR SOLUTION ORAL 2 TIMES DAILY
Status: DISCONTINUED | OUTPATIENT
Start: 2023-10-02 | End: 2023-10-02 | Stop reason: HOSPADM

## 2023-10-02 RX ORDER — NADOLOL 20 MG/1
20 TABLET ORAL DAILY
Status: ON HOLD | COMMUNITY
End: 2023-10-02 | Stop reason: HOSPADM

## 2023-10-02 RX ORDER — SPIRONOLACTONE 100 MG/1
100 TABLET, FILM COATED ORAL DAILY
Qty: 30 TABLET | Refills: 1 | Status: SHIPPED | OUTPATIENT
Start: 2023-10-03 | End: 2023-12-28

## 2023-10-02 RX ORDER — LACTULOSE 10 G/15ML
15 SOLUTION ORAL; RECTAL 3 TIMES DAILY
Qty: 300 ML | Refills: 0 | Status: SHIPPED | OUTPATIENT
Start: 2023-10-02

## 2023-10-02 RX ORDER — FUROSEMIDE 40 MG/1
40 TABLET ORAL DAILY
Qty: 30 TABLET | Refills: 1 | Status: SHIPPED | OUTPATIENT
Start: 2023-10-03 | End: 2023-12-28

## 2023-10-02 RX ADMIN — PANTOPRAZOLE SODIUM 40 MG: 40 TABLET, DELAYED RELEASE ORAL at 06:10

## 2023-10-02 RX ADMIN — LACTULOSE 15 G: 20 SOLUTION ORAL at 08:10

## 2023-10-02 RX ADMIN — POLYETHYLENE GLYCOL 3350 17 G: 17 POWDER, FOR SOLUTION ORAL at 11:10

## 2023-10-02 RX ADMIN — FUROSEMIDE 40 MG: 40 TABLET ORAL at 08:10

## 2023-10-02 RX ADMIN — SPIRONOLACTONE 100 MG: 100 TABLET ORAL at 08:10

## 2023-10-02 NOTE — PLAN OF CARE
[facility] - Telemetry Stepdown  Discharge Final Note    Primary Care Provider: No, Primary Doctor    Expected Discharge Date: 10/2/2023      Patient discharged to home with no post acute needs. PCP office will contact patient for follow up appointment.  Final Discharge Note (most recent)       Final Note - 10/02/23 1425          Final Note    Assessment Type Final Discharge Note (P)      Anticipated Discharge Disposition Home or Self Care (P)         Post-Acute Status    Discharge Delays None known at this time (P)                      Important Message from Medicare             Contact Info       Dr. Trav Casey    [address] [address] [phone]       Next Steps: Follow up    Instructions: Pt's primary care office will make contact with pt to schedule her hospital f/u visit.            HUNG Alcantar  Case Management  [phone]'

## 2023-10-02 NOTE — PLAN OF CARE
Problem: Adult Inpatient Plan of Care  Goal: Plan of Care Review  Outcome: Ongoing, Progressing  Goal: Patient-Specific Goal (Individualized)  Outcome: Ongoing, Progressing  Goal: Absence of Hospital-Acquired Illness or Injury  Outcome: Ongoing, Progressing  Goal: Optimal Comfort and Wellbeing  Outcome: Ongoing, Progressing  Goal: Readiness for Transition of Care  Outcome: Ongoing, Progressing     Problem: Bariatric Environmental Safety  Goal: Safety Maintained with Care  Outcome: Ongoing, Progressing     Problem: Impaired Wound Healing  Goal: Optimal Wound Healing  Outcome: Ongoing, Progressing     Problem: Infection  Goal: Absence of Infection Signs and Symptoms  Outcome: Ongoing, Progressing     Problem: Fall Injury Risk  Goal: Absence of Fall and Fall-Related Injury  Outcome: Ongoing, Progressing     Problem: Pain Acute  Goal: Acceptable Pain Control and Functional Ability  Outcome: Ongoing, Progressing   PT alert and oriented x 4. Able to voice all wants and needs. All needs met.  She has remained free of falls and injuries. Safety eduction and plan of care reviewed with PT and she voiced understanding. Bed is low and locked with call light with in easy reach.

## 2023-10-02 NOTE — DISCHARGE SUMMARY
[facility] - Telemetry [facility] Medicine  Discharge Summary      Patient Name: Litzy Amato  MRN: [id number]  JEREMIAS: [id number]  Patient Class: IP- Inpatient  Admission Date: 9/25/2023  Hospital Length of Stay: 7 days  Discharge Date and Time:  10/02/2023 12:34 PM  Attending Physician: Mellissa Laurent MD   Discharging Provider: Mellissa Laurent MD  Primary Care Provider: Denise Primary Doctor  [facility] Medicine Team: [facility] Mellissa Laurent MD  Primary Care Team: [facility] MED     HPI:   Litzy Amato is a 38 y.o. female with history of alcohol abuse and recently diagnosed alcoholic cirrhosis who presents today with abdominal distention    She provides a surprisingly thorough history.  She reports that on September night, she was admitted at [facility] where she was diagnosed with alcoholic cirrhosis.  Underwent large volume paracentesis, received albumin, and received 2 units RBC transfusion for anemia.  She was started on antibiotics for SBP She states that she underwent EGD, which showed esophageal varices but were not bleeding.  She was placed on nadolol, Protonix, and Macrobid?, with which she reports compliance.  She was discharged after a week, and outpatient GI appointments were made.  Unfortunately, she states that her abdominal distention quickly recurred, and she was evaluated by her PCP who noted her to be hypotensive and appearing unwell in clinic, therefore she was referred to the ED. currently, she complains of abdominal pain due to distention.  She has recently had hematochezia with intermittent melena with each bowel movement.  She does report generalized weakness and fatigue but denies any fever, nausea, or vomiting.  She is had no hematemesis.  She denies any OTC med use at this time.  She reports that her last drink of alcohol was September 1st.      * No surgery found *      Hospital Course:   Patient presents with increased ascites and generalized malaise.   Recently diagnosed with likely alcoholic cirrhosis, and had a prolonged admission requiring large volume paracentesis, EGD which showed esophageal varices, and treatment for SBP per patient's report.  MELD-NA 21. Consulted hepatology. S/p diagnostic and therapeutic paracentesis. Neg for SBP. D/c empiric CTX.  Not in tx evaluation due to alcohol relapse. D/c CTX as neg for SBP. Started  lasix, Spironolactone, lactulose and miralax. Stop BB as unknown hx of EV. Obtained 24 hour urine copper due to low ceruloplasmin. Reported LLQ and LUQ abd pain. Hb noted to be 6.6 on 9/28. Denies obvious bleeds or melena at this time.s/p 1 unit pRBC on 9/28. CT A/P findings suggestive portal hypertension in setting of cirrhosis, including moderate volume ascites, nodular contour liver, splenomegaly, and multiple upper abdominal collateral vessels.Anasarca with edematous changes of the left upper abdominal wall soft tissues and underlying musculature without organized fluid collection. No acute findings Patient to follow up outpatient with hepatology at discharge. Patient is currently medically and HDS. She is being d/c home. Plan of care discussed with patient, verbalized understanding. All questions were answered.         Goals of Care Treatment Preferences:  Code Status: Full Code      Consults:   Consults (From admission, onward)        Status Ordering Provider     Inpatient consult to Psychiatry  Once        Provider:  (Not yet assigned)    ZAMZAM Yan     Inpatient consult to Interventional Radiology  Once        Provider:  (Not yet assigned)    VIOLET Santa     Inpatient consult to Hepatology  Once        Provider:  (Not yet assigned)    VIOLET Santa          No new Assessment & Plan notes have been filed under this hospital service since the last note was generated.  Service: Hospital Medicine    Final Active Diagnoses:    Diagnosis Date Noted POA    PRINCIPAL PROBLEM:   Decompensated hepatic cirrhosis [K72.90, K74.60] 09/25/2023 Yes    Alcohol use disorder [F10.90] 09/26/2023 Yes    Severe obesity (BMI >= 40) [E66.01] 09/26/2023 Yes    H/O gastric sleeve [Z90.3] 09/26/2023 Not Applicable    Ascites due to alcoholic cirrhosis [K70.31] 09/25/2023 Yes     Chronic    Thrombocytopenia [D69.6] 09/25/2023 Yes    Acute on chronic blood loss anemia [D62] 09/25/2023 Yes     Chronic    Hyperbilirubinemia [E80.6] 09/25/2023 Yes    Hyponatremia [E87.1] 09/25/2023 Yes    Alcohol use disorder, severe, dependence [F10.20] 09/25/2023 Yes     Chronic      Problems Resolved During this Admission:       Discharged Condition: stable    Disposition: Home or Self Care    Follow Up:   Follow-up Information     Dr. Trav Casey Follow up.    Why: Pt's primary care office will make contact with pt to schedule her hospital f/u visit.  Contact information:  [address] [address] [phone]                     Patient Instructions:   No discharge procedures on file.    Significant Diagnostic Studies: N/A    Pending Diagnostic Studies:     Procedure Component Value Units Date/Time    IR Paracentesis with Imaging [[id number]]     Order Status: Sent Lab Status: No result          Medications:  Reconciled Home Medications:      Medication List      START taking these medications    CONSTULOSE 10 gram/15 mL solution  Generic drug: lactulose  Take 23 mLs (15 g total) by mouth 3 (three) times daily.     furosemide 40 MG tablet  Commonly known as: LASIX  Take 1 tablet (40 mg total) by mouth once daily.  Start taking on: October 3, 2023     GAVILAX 17 gram/dose powder  Generic drug: polyethylene glycol  Use cap to measure out (17 g) mix with a liquid and take by mouth daily as needed (constipation).     spironolactone 100 MG tablet  Commonly known as: ALDACTONE  Take 1 tablet (100 mg total) by mouth once daily.  Start taking on: October 3, 2023        CONTINUE taking these medications     pantoprazole 40 MG tablet  Commonly known as: PROTONIX  Take 1 tablet (40 mg total) by mouth once daily.        STOP taking these medications    nadoloL 20 MG tablet  Commonly known as: CORGARD            Indwelling Lines/Drains at time of discharge:   Lines/Drains/Airways     None                 Time spent on the discharge of patient: 35 minutes         Mellissa Laurent MD  Department of Hospital Medicine  [facility] - Telemetry Stepdown

## 2023-10-02 NOTE — HOSPITAL COURSE
Patient presents with increased ascites and generalized malaise.  Recently diagnosed with likely alcoholic cirrhosis, and had a prolonged admission requiring large volume paracentesis, EGD which showed esophageal varices, and treatment for SBP per patient's report.  MELD-NA 21. Consulted hepatology. S/p diagnostic and therapeutic paracentesis. Neg for SBP. D/c empiric CTX.  Not in tx evaluation due to alcohol relapse. D/c CTX as neg for SBP. Started  lasix, Spironolactone, lactulose and miralax. Stop BB as unknown hx of EV. Obtained 24 hour urine copper due to low ceruloplasmin. Reported LLQ and LUQ abd pain. Hb noted to be 6.6 on 9/28. Denies obvious bleeds or melena at this time.s/p 1 unit pRBC on 9/28. CT A/P findings suggestive portal hypertension in setting of cirrhosis, including moderate volume ascites, nodular contour liver, splenomegaly, and multiple upper abdominal collateral vessels.Anasarca with edematous changes of the left upper abdominal wall soft tissues and underlying musculature without organized fluid collection. No acute findings Patient to follow up outpatient with hepatology at discharge. Patient is currently medically and HDS. She is being d/c home. Plan of care discussed with patient, verbalized understanding. All questions were answered.

## 2023-10-03 LAB — BACTERIA SPEC ANAEROBE CULT: NORMAL

## 2023-10-05 ENCOUNTER — PATIENT OUTREACH (OUTPATIENT)
Dept: ADMINISTRATIVE | Facility: CLINIC | Age: 38
End: 2023-10-05
Payer: MEDICAID

## 2023-10-05 NOTE — PROGRESS NOTES
C3 nurse attempted to contact Litzy Amato for a TCC post hospital discharge follow up call. No answer. The patient does not have a scheduled HOSFU appointment, and the pt does not have an [facility] PCP.

## 2023-10-06 NOTE — PROGRESS NOTES
C3 nurse spoke with Litzy Amato for a TCC post hospital discharge follow up call. The patient has a scheduled HOSFU appointment with Jett Casey on 10/09/23 @ 1000.

## 2023-10-06 NOTE — PROGRESS NOTES
2nd attempt to make TCC Call. Left voicemail. Please call [phone] leave your first and last name and date of birth and ask for Stacie. I will return your call as soon as possible.

## 2023-10-30 ENCOUNTER — NURSE TRIAGE (OUTPATIENT)
Dept: ADMINISTRATIVE | Facility: CLINIC | Age: 38
End: 2023-10-30
Payer: MEDICAID

## 2023-10-30 NOTE — TELEPHONE ENCOUNTER
Pt was calling with no symptoms or complaints, calling to transfer her prescription from a [place] and send to pharmacy in [place].  Pt made aware to call the pharmacy and have them transfer and call receiving pharmacy to look out for the prescription that is being transferred to them.  Care advice is information only.  Patient verbally understands, all questions answered, advised to call back for any worsening symptoms or further needs.       Reason for Disposition   Pharmacy calling with prescription question and triager answers question    Protocols used: Medication Question Call-A-[place]

## 2023-12-19 ENCOUNTER — OFFICE VISIT (OUTPATIENT)
Dept: HEPATOLOGY | Facility: CLINIC | Age: 38
End: 2023-12-19
Payer: MEDICAID

## 2023-12-19 ENCOUNTER — LAB VISIT (OUTPATIENT)
Dept: LAB | Facility: HOSPITAL | Age: 38
End: 2023-12-19
Attending: INTERNAL MEDICINE
Payer: MEDICAID

## 2023-12-19 VITALS
RESPIRATION RATE: 18 BRPM | DIASTOLIC BLOOD PRESSURE: 67 MMHG | HEIGHT: 70 IN | WEIGHT: 213.63 LBS | HEART RATE: 72 BPM | BODY MASS INDEX: 30.58 KG/M2 | OXYGEN SATURATION: 100 % | SYSTOLIC BLOOD PRESSURE: 123 MMHG

## 2023-12-19 DIAGNOSIS — K70.9 ALCOHOLIC LIVER DISEASE: ICD-10-CM

## 2023-12-19 DIAGNOSIS — K70.9 ALCOHOLIC LIVER DISEASE: Primary | ICD-10-CM

## 2023-12-19 LAB
ALBUMIN SERPL BCP-MCNC: 2.3 G/DL (ref 3.5–5.2)
ALP SERPL-CCNC: 58 U/L (ref 55–135)
ALT SERPL W/O P-5'-P-CCNC: 30 U/L (ref 10–44)
ANION GAP SERPL CALC-SCNC: 5 MMOL/L (ref 8–16)
AST SERPL-CCNC: 69 U/L (ref 10–40)
BASOPHILS # BLD AUTO: 0.01 K/UL (ref 0–0.2)
BASOPHILS NFR BLD: 0.5 % (ref 0–1.9)
BILIRUB SERPL-MCNC: 4.3 MG/DL (ref 0.1–1)
BUN SERPL-MCNC: 11 MG/DL (ref 6–20)
CALCIUM SERPL-MCNC: 8.4 MG/DL (ref 8.7–10.5)
CHLORIDE SERPL-SCNC: 109 MMOL/L (ref 95–110)
CO2 SERPL-SCNC: 24 MMOL/L (ref 23–29)
CREAT SERPL-MCNC: 0.7 MG/DL (ref 0.5–1.4)
DIFFERENTIAL METHOD: ABNORMAL
EOSINOPHIL # BLD AUTO: 0.1 K/UL (ref 0–0.5)
EOSINOPHIL NFR BLD: 2.8 % (ref 0–8)
ERYTHROCYTE [DISTWIDTH] IN BLOOD BY AUTOMATED COUNT: 16.2 % (ref 11.5–14.5)
EST. GFR  (NO RACE VARIABLE): >60 ML/MIN/1.73 M^2
GLUCOSE SERPL-MCNC: 91 MG/DL (ref 70–110)
HCT VFR BLD AUTO: 28.4 % (ref 37–48.5)
HGB BLD-MCNC: 9.1 G/DL (ref 12–16)
IMM GRANULOCYTES # BLD AUTO: 0.01 K/UL (ref 0–0.04)
IMM GRANULOCYTES NFR BLD AUTO: 0.5 % (ref 0–0.5)
INR PPP: 1.7 (ref 0.8–1.2)
LYMPHOCYTES # BLD AUTO: 0.8 K/UL (ref 1–4.8)
LYMPHOCYTES NFR BLD: 34.9 % (ref 18–48)
MCH RBC QN AUTO: 31.3 PG (ref 27–31)
MCHC RBC AUTO-ENTMCNC: 32 G/DL (ref 32–36)
MCV RBC AUTO: 98 FL (ref 82–98)
MONOCYTES # BLD AUTO: 0.3 K/UL (ref 0.3–1)
MONOCYTES NFR BLD: 12.6 % (ref 4–15)
NEUTROPHILS # BLD AUTO: 1.1 K/UL (ref 1.8–7.7)
NEUTROPHILS NFR BLD: 48.7 % (ref 38–73)
NRBC BLD-RTO: 0 /100 WBC
PLATELET # BLD AUTO: 73 K/UL (ref 150–450)
PMV BLD AUTO: 9.4 FL (ref 9.2–12.9)
POTASSIUM SERPL-SCNC: 3.7 MMOL/L (ref 3.5–5.1)
PROT SERPL-MCNC: 7.4 G/DL (ref 6–8.4)
PROTHROMBIN TIME: 17.9 SEC (ref 9–12.5)
RBC # BLD AUTO: 2.91 M/UL (ref 4–5.4)
SODIUM SERPL-SCNC: 138 MMOL/L (ref 136–145)
WBC # BLD AUTO: 2.15 K/UL (ref 3.9–12.7)

## 2023-12-19 PROCEDURE — 3078F PR MOST RECENT DIASTOLIC BLOOD PRESSURE < 80 MM HG: ICD-10-PCS | Mod: CPTII,,, | Performed by: INTERNAL MEDICINE

## 2023-12-19 PROCEDURE — 3074F SYST BP LT 130 MM HG: CPT | Mod: CPTII,,, | Performed by: INTERNAL MEDICINE

## 2023-12-19 PROCEDURE — 3008F PR BODY MASS INDEX (BMI) DOCUMENTED: ICD-10-PCS | Mod: CPTII,,, | Performed by: INTERNAL MEDICINE

## 2023-12-19 PROCEDURE — 85610 PROTHROMBIN TIME: CPT | Performed by: INTERNAL MEDICINE

## 2023-12-19 PROCEDURE — 99213 OFFICE O/P EST LOW 20 MIN: CPT | Mod: PBBFAC | Performed by: INTERNAL MEDICINE

## 2023-12-19 PROCEDURE — 80053 COMPREHEN METABOLIC PANEL: CPT | Performed by: INTERNAL MEDICINE

## 2023-12-19 PROCEDURE — 1159F PR MEDICATION LIST DOCUMENTED IN MEDICAL RECORD: ICD-10-PCS | Mod: CPTII,,, | Performed by: INTERNAL MEDICINE

## 2023-12-19 PROCEDURE — 3074F PR MOST RECENT SYSTOLIC BLOOD PRESSURE < 130 MM HG: ICD-10-PCS | Mod: CPTII,,, | Performed by: INTERNAL MEDICINE

## 2023-12-19 PROCEDURE — 3078F DIAST BP <80 MM HG: CPT | Mod: CPTII,,, | Performed by: INTERNAL MEDICINE

## 2023-12-19 PROCEDURE — 1159F MED LIST DOCD IN RCRD: CPT | Mod: CPTII,,, | Performed by: INTERNAL MEDICINE

## 2023-12-19 PROCEDURE — 99999 PR PBB SHADOW E&M-EST. PATIENT-LVL III: ICD-10-PCS | Mod: PBBFAC,,, | Performed by: INTERNAL MEDICINE

## 2023-12-19 PROCEDURE — 99215 PR OFFICE/OUTPT VISIT, EST, LEVL V, 40-54 MIN: ICD-10-PCS | Mod: S$PBB,,, | Performed by: INTERNAL MEDICINE

## 2023-12-19 PROCEDURE — 36415 COLL VENOUS BLD VENIPUNCTURE: CPT | Performed by: INTERNAL MEDICINE

## 2023-12-19 PROCEDURE — 85025 COMPLETE CBC W/AUTO DIFF WBC: CPT | Performed by: INTERNAL MEDICINE

## 2023-12-19 PROCEDURE — 99215 OFFICE O/P EST HI 40 MIN: CPT | Mod: S$PBB,,, | Performed by: INTERNAL MEDICINE

## 2023-12-19 PROCEDURE — 3008F BODY MASS INDEX DOCD: CPT | Mod: CPTII,,, | Performed by: INTERNAL MEDICINE

## 2023-12-19 PROCEDURE — 99999 PR PBB SHADOW E&M-EST. PATIENT-LVL III: CPT | Mod: PBBFAC,,, | Performed by: INTERNAL MEDICINE

## 2023-12-19 RX ORDER — IRON,CARBONYL 45 MG
325 TABLET ORAL
COMMUNITY

## 2023-12-19 RX ORDER — AMOXICILLIN 500 MG
500 CAPSULE ORAL
COMMUNITY

## 2023-12-19 RX ORDER — NITROFURANTOIN 25; 75 MG/1; MG/1
100 CAPSULE ORAL
COMMUNITY

## 2023-12-19 RX ORDER — FOLIC ACID/MULTIVIT,IRON,MINER 0.4MG-18MG
TABLET ORAL
COMMUNITY

## 2023-12-19 NOTE — PROGRESS NOTES
HEPATOLOGY FOLLOW UP    Litzy Amato is here for follow up of No chief complaint on file.      HPI  38 y.o. female with No chief complaint on file.  Patient admitted 9/25/23 and discharged 10/2/23, here for post-hospital discharge  follow-up.     Has a history of Alcohol-related liver disease, gastric sleeve surgery, one episode of SBP, one paracentesis (6 L) with albumin infusion, 2 uPRBCs for anemia, eso varices, nonbleeding.  She was discharged but redeveloped abd distention, had some hematochezia, and intermittent melena, was noted to be hypotensive, needing readmission on 9/25/23.  Para repeated (3 L), no SBP found this time. Hgb 6.6, was given I U PRBCs, CT A/P findings of cirrhosis with portal hypertension, anasarca.    Today 12/19/24:  Lost a lot of wt, 73 lb in 3 months.  Edema in the lower extremities is gone.  She feels abd is still distended, but exam of abdomen reveals thick and wall.      Has not touched alcohol since 9/2/2024.  Prior to that she had quit drinking but after her 's death on 6/5/23, from a heart attack, aneurysm, she started drinking again.  Has no children.   Has a pretty big family, but lost 5 family members in 6 months (April to Sept 2024).  3 of the family members had CA (pancreatic, liver, grandPa CA unknown).    Discharge Meds include diuretics lasix 40 and spironolactone 100 daily, lactulose, pantoprazole, miralax,.            Outpatient Encounter Medications as of 12/19/2023   Medication Sig Dispense Refill    docusate sodium (COLACE ORAL) Take 100 mg by mouth.      ELDERBERRY FRUIT ORAL Take by mouth.      furosemide (LASIX) 40 MG tablet Take 1 tablet (40 mg total) by mouth once daily. 30 tablet 1    iron, carbonyl (FEOSOL) 45 mg Tab Take 325 mg by mouth.      lactulose (CHRONULAC) 10 gram/15 mL solution Take 23 mLs (15 g total) by mouth 3 (three) times daily. 300 mL 0    nitrofurantoin, macrocrystal-monohydrate, (MACROBID) 100 MG capsule Take 100 mg by mouth.       omega-3 fatty acids/fish oil (FISH OIL-OMEGA-3 FATTY ACIDS) 300-1,000 mg capsule Take 500 mg by mouth.      pantoprazole (PROTONIX) 40 MG tablet Take 1 tablet (40 mg total) by mouth once daily. 30 tablet 0    PNV cmb#95-ferrous fumarate-FA (PRENATAL MULTIVITAMINS) 28 mg iron- 800 mcg Tab Take by mouth.      polyethylene glycol (GLYCOLAX) 17 gram/dose powder Use cap to measure out (17 g) mix with a liquid and take by mouth daily as needed (constipation). 510 g 0    spironolactone (ALDACTONE) 100 MG tablet Take 1 tablet (100 mg total) by mouth once daily. 30 tablet 1     No facility-administered encounter medications on file as of 12/19/2023.     Review of patient's allergies indicates:  No Known Allergies  Past Medical History:   Diagnosis Date    Unspecified cirrhosis of liver      No past surgical history on file.  Social History     Socioeconomic History    Marital status:      Social Determinants of Health     Financial Resource Strain: Medium Risk (9/27/2023)    Overall Financial Resource Strain (CARDIA)     Difficulty of Paying Living Expenses: Somewhat hard   Food Insecurity: No Food Insecurity (9/27/2023)    Hunger Vital Sign     Worried About Running Out of Food in the Last Year: Never true     Ran Out of Food in the Last Year: Never true   Transportation Needs: Unmet Transportation Needs (9/27/2023)    PRAPARE - Transportation     Lack of Transportation (Medical): Yes     Lack of Transportation (Non-Medical): Yes   Physical Activity: Inactive (9/27/2023)    Exercise Vital Sign     Days of Exercise per Week: 0 days     Minutes of Exercise per Session: 0 min   Stress: Stress Concern Present (9/27/2023)    [place] [employer] - Occupational Stress Questionnaire     Feeling of Stress : To some extent   Social Connections: Moderately Isolated (9/27/2023)    Social Connection and Isolation Panel [NHANES]     Frequency of Communication with Friends and Family: More than three times a  week     Frequency of Social Gatherings with Friends and Family: More than three times a week     Attends Restorationism Services: More than 4 times per year     Active Member of Clubs or Organizations: No     Attends Club or Organization Meetings: Never     Marital Status:    Housing Stability: Low Risk  (9/27/2023)    Housing Stability Vital Sign     Unable to Pay for Housing in the Last Year: No     Number of Places Lived in the Last Year: 2     Unstable Housing in the Last Year: No       ROS  Gen- no wt loss, gain, fever, chills  HEENT - no congestion, nosebleed, visual or hearing problems  Chest - no cough, shortness of breath, chest pain  Cardiovascular- no chest pain, leg swelling, dyspnea on exertion or at rest, orthopnea  GI- no abdominal pain, nausea, vomiting, constipation, or diarrhea   Musculoskeletal:  no pain or swelling of joints  Neuro - no headaches, dizziness, weakness, tingling numbness in hands or feet,  Skin- no rash, itching  Psych - no depression, anxiety, sleep disturbance, memory loss    Vitals:    12/19/23 0913   BP: 123/67   Pulse: 72   Resp: 18       Physical Exam:  General Appearance: Well appearing in no acute distress  Head:   Normocephalic, without obvious abnormality  Eyes:    No scleral icterus, EOMI  ENT: Neck supple, Lips, mucosa, and tongue normal; teeth and gums normal  Lungs: CTA bilaterally in anterior and posterior fields, no wheezes, no crackles.  Heart:  Regular rate and rhythm, S1, S2 normal, no murmurs heard  Abdomen: Soft, non tender, non distended with positive bowel sounds in all four quadrants. No hepatosplenomegaly, ascites, or mass  Extremities: 2+ pulses, no clubbing, cyanosis or edema  Skin: No rash  Neurologic: CN II-XII intact, alert, oriented x 3, no asterixis.     Lab Results   Component Value Date    GLU 82 10/02/2023    BUN 7 10/02/2023    CREATININE 0.8 10/02/2023    CALCIUM 8.0 (L) 10/02/2023     (L) 10/02/2023    K 3.8 10/02/2023      10/02/2023    PROT 6.0 10/02/2023    CO2 26 10/02/2023    ANIONGAP 5 (L) 10/02/2023    WBC 2.32 (L) 10/02/2023    RBC 2.21 (L) 10/02/2023    HGB 7.8 (L) 10/02/2023    HCT 23.4 (L) 10/02/2023     (H) 10/02/2023    MCH 35.3 (H) 10/02/2023    MCHC 33.3 10/02/2023     Lab Results   Component Value Date    RDW 18.6 (H) 10/02/2023    PLT 73 (L) 10/02/2023    MPV 10.4 10/02/2023    GRAN 1.0 (L) 10/02/2023    GRAN 43.9 10/02/2023    LYMPH 0.8 (L) 10/02/2023    LYMPH 35.8 10/02/2023    MONO 0.3 10/02/2023    MONO 14.7 10/02/2023    EOSINOPHIL 4.7 10/02/2023    BASOPHIL 0.9 10/02/2023    EOS 0.1 10/02/2023    BASO 0.02 10/02/2023    APTT 29.6 09/05/2021    GROUPTRH O NEG 09/28/2023     (H) 09/25/2023    ALBUMIN 2.1 (L) 10/02/2023    BILIDIR 2.4 (H) 09/27/2023    AST 49 (H) 10/02/2023    ALT 15 10/02/2023    ALKPHOS 44 (L) 10/02/2023    MG 1.7 09/26/2023    LABPROT 22.1 (H) 10/02/2023    INR 2.2 (H) 10/02/2023       Diagnostics:     Assessment   No diagnosis found.  Alcoholic cirrhosis, ascites has improved, edema is gone, lost 73 lb in 3 months.  Abstinent since 9/2/23.   Have told her Never to drink alcohol again.      Plan:  -  No More Alcohol, ever ever.  Patient agrees.   -  Labs every 4 weeks x 4, then every 3 month:  CBC, CMP, PT INR  -  Ultrasound of abdomen and AFP every 6 months, next due in March 2024  -  Low salt in the diet, avoid canned, bottled and processed foods.  -  Continue current meds  -  Avoid alcohol, smoking, sedatives and meds with codeine.  -  Avoid high intake of Tylenol (more than 4 extra-strength pills in one day)  -  Call us if any bleeding, fevers, confusion, disorientation occur  -  Endoscopy: past EGD at [facility]. - follow up with same doctor.  -  Transplant option discussed, will evaluate if liver shows no improvement, labs today.   -  Return in 2 months.     Magalis Min MD  Hepatology. [facility]

## 2023-12-19 NOTE — PATIENT INSTRUCTIONS
Plan:  -  No More Alcohol, ever ever. Patient agrees.   -  Labs every 4 weeks x 4, then every 3 month:  CBC, CMP, PT INR  -  Ultrasound of abdomen and AFP every 6 months, next due in March 2024  -  Low salt in the diet, avoid canned, bottled and processed foods.  -  Continue current meds  -  Avoid alcohol, smoking, sedatives and meds with codeine.  -  Avoid high intake of Tylenol (more than 4 extra-strength pills in one day)  -  Call us if any bleeding, fevers, confusion, disorientation occur  -  Endoscopy: past EGD at [facility] - follow up with same doctor.  -  Transplant option discussed, will evaluate if liver shows no improvement, labs today.   -  Return in 2 months.

## 2023-12-19 NOTE — Clinical Note
Plan: -  No More Alcohol, ever ever. Patient agrees.  -  Labs every 4 weeks x 4, then every 3 month:  CBC, CMP, PT INR -  Ultrasound of abdomen and AFP every 6 months, next due in March 2024 -  Low salt in the diet, avoid canned, bottled and processed foods. -  Continue current meds -  Avoid alcohol, smoking, sedatives and meds with codeine. -  Avoid high intake of Tylenol (more than 4 extra-strength pills in one day) -  Call us if any bleeding, fevers, confusion, disorientation occur -  Endoscopy: past EGD at [facility]. - follow up with same doctor. -  Transplant option discussed, will evaluate if liver shows no improvement, labs today.  -  Return in 2 months.

## 2023-12-26 ENCOUNTER — APPOINTMENT (OUTPATIENT)
Dept: RADIOLOGY | Facility: HOSPITAL | Age: 38
End: 2023-12-26
Attending: INTERNAL MEDICINE
Payer: MEDICAID

## 2023-12-26 DIAGNOSIS — K70.9 ALCOHOLIC LIVER DISEASE: ICD-10-CM

## 2023-12-26 PROCEDURE — 76705 ECHO EXAM OF ABDOMEN: CPT | Mod: TC,PO

## 2023-12-26 PROCEDURE — 76705 US ABDOMEN LIMITED: ICD-10-PCS | Mod: 26,,, | Performed by: RADIOLOGY

## 2023-12-26 PROCEDURE — 76705 ECHO EXAM OF ABDOMEN: CPT | Mod: 26,,, | Performed by: RADIOLOGY

## 2023-12-27 ENCOUNTER — PATIENT MESSAGE (OUTPATIENT)
Dept: HEPATOLOGY | Facility: CLINIC | Age: 38
End: 2023-12-27
Payer: MEDICAID

## 2023-12-27 ENCOUNTER — HOSPITAL ENCOUNTER (OUTPATIENT)
Facility: HOSPITAL | Age: 38
Discharge: HOME | End: 2023-12-28
Attending: EMERGENCY MEDICINE | Admitting: EMERGENCY MEDICINE
Payer: MEDICAID

## 2023-12-27 DIAGNOSIS — R07.9 CHEST PAIN: ICD-10-CM

## 2023-12-27 DIAGNOSIS — K70.31 ALCOHOLIC CIRRHOSIS OF LIVER WITH ASCITES: ICD-10-CM

## 2023-12-27 DIAGNOSIS — R18.8 ASCITES: Primary | ICD-10-CM

## 2023-12-27 LAB
ALBUMIN SERPL BCP-MCNC: 2.4 G/DL (ref 3.5–5.2)
ALP SERPL-CCNC: 78 U/L (ref 55–135)
ALT SERPL W/O P-5'-P-CCNC: 22 U/L (ref 10–44)
AMMONIA PLAS-SCNC: 44 UMOL/L (ref 10–50)
ANION GAP SERPL CALC-SCNC: 6 MMOL/L (ref 8–16)
AST SERPL-CCNC: 58 U/L (ref 10–40)
BASOPHILS # BLD AUTO: 0.02 K/UL (ref 0–0.2)
BASOPHILS NFR BLD: 0.8 % (ref 0–1.9)
BILIRUB SERPL-MCNC: 3.7 MG/DL (ref 0.1–1)
BUN SERPL-MCNC: 10 MG/DL (ref 6–20)
CALCIUM SERPL-MCNC: 8 MG/DL (ref 8.7–10.5)
CHLORIDE SERPL-SCNC: 106 MMOL/L (ref 95–110)
CO2 SERPL-SCNC: 23 MMOL/L (ref 23–29)
CREAT SERPL-MCNC: 0.8 MG/DL (ref 0.5–1.4)
DIFFERENTIAL METHOD BLD: ABNORMAL
EOSINOPHIL # BLD AUTO: 0.1 K/UL (ref 0–0.5)
EOSINOPHIL NFR BLD: 2 % (ref 0–8)
ERYTHROCYTE [DISTWIDTH] IN BLOOD BY AUTOMATED COUNT: 17.1 % (ref 11.5–14.5)
EST. GFR  (NO RACE VARIABLE): >60 ML/MIN/1.73 M^2
GLUCOSE SERPL-MCNC: 118 MG/DL (ref 70–110)
HCT VFR BLD AUTO: 29.6 % (ref 37–48.5)
HGB BLD-MCNC: 9.5 G/DL (ref 12–16)
IMM GRANULOCYTES # BLD AUTO: 0.01 K/UL (ref 0–0.04)
IMM GRANULOCYTES NFR BLD AUTO: 0.4 % (ref 0–0.5)
INR PPP: 1.6 (ref 0.8–1.2)
LACTATE SERPL-SCNC: 1.4 MMOL/L (ref 0.5–2.2)
LIPASE SERPL-CCNC: 47 U/L (ref 4–60)
LYMPHOCYTES # BLD AUTO: 0.9 K/UL (ref 1–4.8)
LYMPHOCYTES NFR BLD: 36.8 % (ref 18–48)
MAGNESIUM SERPL-MCNC: 1.7 MG/DL (ref 1.6–2.6)
MCH RBC QN AUTO: 30.9 PG (ref 27–31)
MCHC RBC AUTO-ENTMCNC: 32.1 G/DL (ref 32–36)
MCV RBC AUTO: 96 FL (ref 82–98)
MONOCYTES # BLD AUTO: 0.3 K/UL (ref 0.3–1)
MONOCYTES NFR BLD: 10.4 % (ref 4–15)
NEUTROPHILS # BLD AUTO: 1.2 K/UL (ref 1.8–7.7)
NEUTROPHILS NFR BLD: 49.6 % (ref 38–73)
NRBC BLD-RTO: 0 /100 WBC
PHOSPHATE SERPL-MCNC: 2.7 MG/DL (ref 2.7–4.5)
PLATELET # BLD AUTO: 91 K/UL (ref 150–450)
PMV BLD AUTO: 11.5 FL (ref 9.2–12.9)
POTASSIUM SERPL-SCNC: 3.5 MMOL/L (ref 3.5–5.1)
PROCALCITONIN SERPL IA-MCNC: 0.04 NG/ML
PROT SERPL-MCNC: 7.4 G/DL (ref 6–8.4)
PROTHROMBIN TIME: 17.1 SEC (ref 9–12.5)
RBC # BLD AUTO: 3.07 M/UL (ref 4–5.4)
SODIUM SERPL-SCNC: 135 MMOL/L (ref 136–145)
WBC # BLD AUTO: 2.5 K/UL (ref 3.9–12.7)

## 2023-12-27 PROCEDURE — 82140 ASSAY OF AMMONIA: CPT

## 2023-12-27 PROCEDURE — 99285 EMERGENCY DEPT VISIT HI MDM: CPT | Mod: 25

## 2023-12-27 PROCEDURE — 84145 PROCALCITONIN (PCT): CPT | Performed by: PHYSICIAN ASSISTANT

## 2023-12-27 PROCEDURE — 63600175 PHARM REV CODE 636 W HCPCS

## 2023-12-27 PROCEDURE — 96374 THER/PROPH/DIAG INJ IV PUSH: CPT

## 2023-12-27 PROCEDURE — 83735 ASSAY OF MAGNESIUM: CPT | Performed by: PHYSICIAN ASSISTANT

## 2023-12-27 PROCEDURE — 85025 COMPLETE CBC W/AUTO DIFF WBC: CPT | Performed by: PHYSICIAN ASSISTANT

## 2023-12-27 PROCEDURE — 85610 PROTHROMBIN TIME: CPT | Performed by: PHYSICIAN ASSISTANT

## 2023-12-27 PROCEDURE — 83605 ASSAY OF LACTIC ACID: CPT | Performed by: PHYSICIAN ASSISTANT

## 2023-12-27 PROCEDURE — 80053 COMPREHEN METABOLIC PANEL: CPT | Performed by: PHYSICIAN ASSISTANT

## 2023-12-27 PROCEDURE — 84100 ASSAY OF PHOSPHORUS: CPT | Performed by: PHYSICIAN ASSISTANT

## 2023-12-27 PROCEDURE — 81003 URINALYSIS AUTO W/O SCOPE: CPT | Performed by: PHYSICIAN ASSISTANT

## 2023-12-27 PROCEDURE — 83690 ASSAY OF LIPASE: CPT | Performed by: PHYSICIAN ASSISTANT

## 2023-12-27 RX ORDER — FUROSEMIDE 10 MG/ML
80 INJECTION INTRAMUSCULAR; INTRAVENOUS
Status: COMPLETED | OUTPATIENT
Start: 2023-12-27 | End: 2023-12-27

## 2023-12-27 RX ADMIN — FUROSEMIDE 80 MG: 10 INJECTION, SOLUTION INTRAVENOUS at 11:12

## 2023-12-27 NOTE — Clinical Note
Diagnosis: Ascites [259683]   Future Attending Provider: ISMA METZGER [[id number]]   Is the patient being sent to ED Observation?: Yes   Admitting Provider:: ISMA METZGER [[id number]]

## 2023-12-28 VITALS
BODY MASS INDEX: 32.64 KG/M2 | SYSTOLIC BLOOD PRESSURE: 110 MMHG | HEIGHT: 70 IN | HEART RATE: 75 BPM | WEIGHT: 228 LBS | DIASTOLIC BLOOD PRESSURE: 65 MMHG | TEMPERATURE: 98 F | OXYGEN SATURATION: 99 % | RESPIRATION RATE: 16 BRPM

## 2023-12-28 LAB
ANION GAP SERPL CALC-SCNC: 9 MMOL/L (ref 8–16)
BASOPHILS # BLD AUTO: 0.01 K/UL (ref 0–0.2)
BASOPHILS NFR BLD: 0.4 % (ref 0–1.9)
BILIRUB UR QL STRIP: NEGATIVE
BUN SERPL-MCNC: 11 MG/DL (ref 6–20)
CALCIUM SERPL-MCNC: 8.1 MG/DL (ref 8.7–10.5)
CHLORIDE SERPL-SCNC: 105 MMOL/L (ref 95–110)
CLARITY UR REFRACT.AUTO: CLEAR
CO2 SERPL-SCNC: 22 MMOL/L (ref 23–29)
COLOR UR AUTO: YELLOW
CREAT SERPL-MCNC: 0.7 MG/DL (ref 0.5–1.4)
DIFFERENTIAL METHOD BLD: ABNORMAL
EOSINOPHIL # BLD AUTO: 0.1 K/UL (ref 0–0.5)
EOSINOPHIL NFR BLD: 2.6 % (ref 0–8)
ERYTHROCYTE [DISTWIDTH] IN BLOOD BY AUTOMATED COUNT: 17 % (ref 11.5–14.5)
EST. GFR  (NO RACE VARIABLE): >60 ML/MIN/1.73 M^2
GLUCOSE SERPL-MCNC: 87 MG/DL (ref 70–110)
GLUCOSE UR QL STRIP: NEGATIVE
HCT VFR BLD AUTO: 27.3 % (ref 37–48.5)
HGB BLD-MCNC: 9 G/DL (ref 12–16)
HGB UR QL STRIP: NEGATIVE
IMM GRANULOCYTES # BLD AUTO: 0 K/UL (ref 0–0.04)
IMM GRANULOCYTES NFR BLD AUTO: 0 % (ref 0–0.5)
KETONES UR QL STRIP: NEGATIVE
LEUKOCYTE ESTERASE UR QL STRIP: NEGATIVE
LYMPHOCYTES # BLD AUTO: 1 K/UL (ref 1–4.8)
LYMPHOCYTES NFR BLD: 43.6 % (ref 18–48)
MCH RBC QN AUTO: 31.6 PG (ref 27–31)
MCHC RBC AUTO-ENTMCNC: 33 G/DL (ref 32–36)
MCV RBC AUTO: 96 FL (ref 82–98)
MONOCYTES # BLD AUTO: 0.3 K/UL (ref 0.3–1)
MONOCYTES NFR BLD: 11.1 % (ref 4–15)
NEUTROPHILS # BLD AUTO: 1 K/UL (ref 1.8–7.7)
NEUTROPHILS NFR BLD: 42.3 % (ref 38–73)
NITRITE UR QL STRIP: NEGATIVE
NRBC BLD-RTO: 0 /100 WBC
PH UR STRIP: 6 [PH] (ref 5–8)
PLATELET # BLD AUTO: 78 K/UL (ref 150–450)
PMV BLD AUTO: 9.9 FL (ref 9.2–12.9)
POTASSIUM SERPL-SCNC: 2.9 MMOL/L (ref 3.5–5.1)
PROT UR QL STRIP: NEGATIVE
RBC # BLD AUTO: 2.85 M/UL (ref 4–5.4)
SODIUM SERPL-SCNC: 136 MMOL/L (ref 136–145)
SP GR UR STRIP: 1.02 (ref 1–1.03)
URN SPEC COLLECT METH UR: NORMAL
WBC # BLD AUTO: 2.34 K/UL (ref 3.9–12.7)

## 2023-12-28 PROCEDURE — G0378 HOSPITAL OBSERVATION PER HR: HCPCS

## 2023-12-28 PROCEDURE — 80048 BASIC METABOLIC PNL TOTAL CA: CPT | Performed by: PHYSICIAN ASSISTANT

## 2023-12-28 PROCEDURE — 63600175 PHARM REV CODE 636 W HCPCS: Mod: JZ,JG | Performed by: PHYSICIAN ASSISTANT

## 2023-12-28 PROCEDURE — 25000003 PHARM REV CODE 250: Performed by: PHYSICIAN ASSISTANT

## 2023-12-28 PROCEDURE — 85025 COMPLETE CBC W/AUTO DIFF WBC: CPT | Performed by: PHYSICIAN ASSISTANT

## 2023-12-28 PROCEDURE — P9047 ALBUMIN (HUMAN), 25%, 50ML: HCPCS | Mod: JZ,JG | Performed by: PHYSICIAN ASSISTANT

## 2023-12-28 RX ORDER — IPRATROPIUM BROMIDE AND ALBUTEROL SULFATE 2.5; .5 MG/3ML; MG/3ML
3 SOLUTION RESPIRATORY (INHALATION) EVERY 6 HOURS PRN
Status: DISCONTINUED | OUTPATIENT
Start: 2023-12-28 | End: 2023-12-28 | Stop reason: HOSPADM

## 2023-12-28 RX ORDER — POLYETHYLENE GLYCOL 3350 17 G/17G
17 POWDER, FOR SOLUTION ORAL DAILY PRN
Status: DISCONTINUED | OUTPATIENT
Start: 2023-12-28 | End: 2023-12-28 | Stop reason: HOSPADM

## 2023-12-28 RX ORDER — PANTOPRAZOLE SODIUM 40 MG/1
40 TABLET, DELAYED RELEASE ORAL DAILY
Status: DISCONTINUED | OUTPATIENT
Start: 2023-12-28 | End: 2023-12-28 | Stop reason: HOSPADM

## 2023-12-28 RX ORDER — SPIRONOLACTONE 50 MG/1
100 TABLET, FILM COATED ORAL DAILY
Status: DISCONTINUED | OUTPATIENT
Start: 2023-12-28 | End: 2023-12-28 | Stop reason: HOSPADM

## 2023-12-28 RX ORDER — ALBUMIN HUMAN 250 G/1000ML
SOLUTION INTRAVENOUS
Status: COMPLETED
Start: 2023-12-28 | End: 2023-12-28

## 2023-12-28 RX ORDER — MAG HYDROX/ALUMINUM HYD/SIMETH 200-200-20
30 SUSPENSION, ORAL (FINAL DOSE FORM) ORAL 4 TIMES DAILY PRN
Status: DISCONTINUED | OUTPATIENT
Start: 2023-12-28 | End: 2023-12-28 | Stop reason: HOSPADM

## 2023-12-28 RX ORDER — GLUCAGON 1 MG
1 KIT INJECTION
Status: DISCONTINUED | OUTPATIENT
Start: 2023-12-28 | End: 2023-12-28 | Stop reason: HOSPADM

## 2023-12-28 RX ORDER — FUROSEMIDE 40 MG/1
40 TABLET ORAL DAILY
Qty: 30 TABLET | Refills: 0 | Status: SHIPPED | OUTPATIENT
Start: 2023-12-28 | End: 2024-02-12 | Stop reason: SDUPTHER

## 2023-12-28 RX ORDER — ENOXAPARIN SODIUM 100 MG/ML
40 INJECTION SUBCUTANEOUS EVERY 24 HOURS
Status: DISCONTINUED | OUTPATIENT
Start: 2023-12-28 | End: 2023-12-28 | Stop reason: HOSPADM

## 2023-12-28 RX ORDER — DIPHENHYDRAMINE HCL 25 MG
25 CAPSULE ORAL EVERY 6 HOURS PRN
Status: DISCONTINUED | OUTPATIENT
Start: 2023-12-28 | End: 2023-12-28 | Stop reason: HOSPADM

## 2023-12-28 RX ORDER — SIMETHICONE 80 MG
1 TABLET,CHEWABLE ORAL 4 TIMES DAILY PRN
Status: DISCONTINUED | OUTPATIENT
Start: 2023-12-28 | End: 2023-12-28 | Stop reason: HOSPADM

## 2023-12-28 RX ORDER — IBUPROFEN 600 MG/1
600 TABLET ORAL
Status: COMPLETED | OUTPATIENT
Start: 2023-12-28 | End: 2023-12-28

## 2023-12-28 RX ORDER — IBUPROFEN 200 MG
16 TABLET ORAL
Status: DISCONTINUED | OUTPATIENT
Start: 2023-12-28 | End: 2023-12-28 | Stop reason: HOSPADM

## 2023-12-28 RX ORDER — ONDANSETRON 8 MG/1
8 TABLET, ORALLY DISINTEGRATING ORAL EVERY 8 HOURS PRN
Status: DISCONTINUED | OUTPATIENT
Start: 2023-12-28 | End: 2023-12-28 | Stop reason: HOSPADM

## 2023-12-28 RX ORDER — ONDANSETRON 4 MG/1
4 TABLET, ORALLY DISINTEGRATING ORAL EVERY 6 HOURS PRN
Status: DISCONTINUED | OUTPATIENT
Start: 2023-12-28 | End: 2023-12-28

## 2023-12-28 RX ORDER — TALC
6 POWDER (GRAM) TOPICAL NIGHTLY PRN
Status: DISCONTINUED | OUTPATIENT
Start: 2023-12-28 | End: 2023-12-28 | Stop reason: HOSPADM

## 2023-12-28 RX ORDER — POTASSIUM CHLORIDE 20 MEQ/1
40 TABLET, EXTENDED RELEASE ORAL EVERY 4 HOURS
Status: COMPLETED | OUTPATIENT
Start: 2023-12-28 | End: 2023-12-28

## 2023-12-28 RX ORDER — NALOXONE HCL 0.4 MG/ML
0.02 VIAL (ML) INJECTION
Status: DISCONTINUED | OUTPATIENT
Start: 2023-12-28 | End: 2023-12-28 | Stop reason: HOSPADM

## 2023-12-28 RX ORDER — SODIUM CHLORIDE 0.9 % (FLUSH) 0.9 %
10 SYRINGE (ML) INJECTION EVERY 8 HOURS
Status: DISCONTINUED | OUTPATIENT
Start: 2023-12-28 | End: 2023-12-28 | Stop reason: HOSPADM

## 2023-12-28 RX ORDER — FUROSEMIDE 40 MG/1
40 TABLET ORAL DAILY
Status: DISCONTINUED | OUTPATIENT
Start: 2023-12-28 | End: 2023-12-28 | Stop reason: HOSPADM

## 2023-12-28 RX ORDER — POTASSIUM CHLORIDE 7.45 MG/ML
20 INJECTION INTRAVENOUS ONCE
Status: COMPLETED | OUTPATIENT
Start: 2023-12-28 | End: 2023-12-28

## 2023-12-28 RX ORDER — ALBUMIN HUMAN 250 G/1000ML
SOLUTION INTRAVENOUS
Status: DISCONTINUED | OUTPATIENT
Start: 2023-12-28 | End: 2023-12-28 | Stop reason: HOSPADM

## 2023-12-28 RX ORDER — LIDOCAINE HYDROCHLORIDE 10 MG/ML
INJECTION INFILTRATION; PERINEURAL
Status: DISCONTINUED | OUTPATIENT
Start: 2023-12-28 | End: 2023-12-28 | Stop reason: HOSPADM

## 2023-12-28 RX ORDER — SPIRONOLACTONE 100 MG/1
100 TABLET, FILM COATED ORAL DAILY
Qty: 30 TABLET | Refills: 0 | Status: SHIPPED | OUTPATIENT
Start: 2023-12-28 | End: 2024-02-12 | Stop reason: SDUPTHER

## 2023-12-28 RX ORDER — ACETAMINOPHEN 325 MG/1
650 TABLET ORAL EVERY 4 HOURS PRN
Status: DISCONTINUED | OUTPATIENT
Start: 2023-12-28 | End: 2023-12-28 | Stop reason: HOSPADM

## 2023-12-28 RX ORDER — LACTULOSE 10 G/15ML
15 SOLUTION ORAL 3 TIMES DAILY
Status: DISCONTINUED | OUTPATIENT
Start: 2023-12-28 | End: 2023-12-28 | Stop reason: HOSPADM

## 2023-12-28 RX ORDER — PROCHLORPERAZINE EDISYLATE 5 MG/ML
5 INJECTION INTRAMUSCULAR; INTRAVENOUS EVERY 6 HOURS PRN
Status: DISCONTINUED | OUTPATIENT
Start: 2023-12-28 | End: 2023-12-28 | Stop reason: HOSPADM

## 2023-12-28 RX ORDER — IBUPROFEN 200 MG
24 TABLET ORAL
Status: DISCONTINUED | OUTPATIENT
Start: 2023-12-28 | End: 2023-12-28 | Stop reason: HOSPADM

## 2023-12-28 RX ADMIN — ALBUMIN (HUMAN) 62.5 G: 25 SOLUTION INTRAVENOUS at 03:12

## 2023-12-28 RX ADMIN — POTASSIUM CHLORIDE 40 MEQ: 1500 TABLET, EXTENDED RELEASE ORAL at 09:12

## 2023-12-28 RX ADMIN — SPIRONOLACTONE 100 MG: 50 TABLET ORAL at 04:12

## 2023-12-28 RX ADMIN — ONDANSETRON 8 MG: 8 TABLET, ORALLY DISINTEGRATING ORAL at 04:12

## 2023-12-28 RX ADMIN — FUROSEMIDE 40 MG: 40 TABLET ORAL at 04:12

## 2023-12-28 RX ADMIN — POTASSIUM CHLORIDE 40 MEQ: 1500 TABLET, EXTENDED RELEASE ORAL at 04:12

## 2023-12-28 RX ADMIN — IBUPROFEN 600 MG: 600 TABLET ORAL at 09:12

## 2023-12-28 RX ADMIN — POTASSIUM CHLORIDE 20 MEQ: 7.46 INJECTION, SOLUTION INTRAVENOUS at 10:12

## 2023-12-28 RX ADMIN — LACTULOSE 15 G: 20 SOLUTION ORAL at 09:12

## 2023-12-28 RX ADMIN — PANTOPRAZOLE SODIUM 40 MG: 40 TABLET, DELAYED RELEASE ORAL at 09:12

## 2023-12-28 RX ADMIN — LIDOCAINE HYDROCHLORIDE 10 ML: 10 INJECTION, SOLUTION INFILTRATION; PERINEURAL at 02:12

## 2023-12-28 NOTE — ED NOTES
Care assumed at this time. Pt is A&Ox4, observed resting in no acute distress. V/S stable, call bell within reach, bed in low locked position. Care assumed at this time.

## 2023-12-28 NOTE — PROGRESS NOTES
ED Observation Unit  Progress Note      HPI   Litzy Amato is a 38 y.o. female with medical history of ETOH Cirrhosis, Esophageal Varices, Portal HTN, Gastric Sleeve presenting to the ED with the chief complaint of abdominal distention.      Reports significant weight gain with abdominal distention over the last month. Reports 30 pound weight gain over the last 3 weeks and 13 pound weight gain over the last week. She had an abdominal US done 2 days ago that showed significant abdominal fluid and advised to come to the ED for evaluation. Reports running out of her Lasix earlier this month and it was not refilled for an unclear reason. Last Drink was in September. Denies confusion. Drove herself to the ED. No melena, hematochezia, melena, hemoptysis, hematemesis. F/B Dr. Min in Hepatology.      In the ED, chronic leukopenia 2.5, H/H 9.5/29.6 (baseline), Tbil 3.7, Alk phos 78, AST 58, ALT 22, Lipase 47. Lactate normal 1.4. Procal 0.04. MELD-Na 19. CXR with no large pleural effusion or consolidation. Received Lasix 80mg IV x1.      I reviewed the ED Provider Note dated 12/28/23 prior to my evaluation of this patient.  I reviewed all labs and imaging performed in the Main ED, prior to patient being placed in Observation. Patient was placed in the ED Observation Unit for ascites.    Interval History   NAEON. Pt seen and evaluated at bedside this am by me. Pt is doing well, AFVSS. Continues to have some abdominal discomfort 2/2 swelling but unchanged from previous. Orders for therapeutic paracentesis place. Per hepatology, patient is to continue lasix as prescribed at discharge.     PMHx   Past Medical History:   Diagnosis Date    Unspecified cirrhosis of liver       No past surgical history on file.     Family Hx   No family history on file.     Social Hx   Social History     Socioeconomic History    Marital status:      Social Determinants of Health     Financial Resource Strain: Medium Risk  (9/27/2023)    Overall Financial Resource Strain (CARDIA)     Difficulty of Paying Living Expenses: Somewhat hard   Food Insecurity: No Food Insecurity (9/27/2023)    Hunger Vital Sign     Worried About Running Out of Food in the Last Year: Never true     Ran Out of Food in the Last Year: Never true   Transportation Needs: Unmet Transportation Needs (9/27/2023)    PRAPARE - Transportation     Lack of Transportation (Medical): Yes     Lack of Transportation (Non-Medical): Yes   Physical Activity: Inactive (9/27/2023)    Exercise Vital Sign     Days of Exercise per Week: 0 days     Minutes of Exercise per Session: 0 min   Stress: Stress Concern Present (9/27/2023)    [place] citizen [place] of Occupational Health - Occupational Stress Questionnaire     Feeling of Stress : To some extent   Social Connections: Moderately Isolated (9/27/2023)    Social Connection and Isolation Panel [NHANES]     Frequency of Communication with Friends and Family: More than three times a week     Frequency of Social Gatherings with Friends and Family: More than three times a week     Attends Yazidism Services: More than 4 times per year     Active Member of Clubs or Organizations: No     Attends Club or Organization Meetings: Never     Marital Status:    Housing Stability: Low Risk  (9/27/2023)    Housing Stability Vital Sign     Unable to Pay for Housing in the Last Year: No     Number of Places Lived in the Last Year: 2     Unstable Housing in the Last Year: No        Vital Signs   Vitals:    12/28/23 0628 12/28/23 0730 12/28/23 0838 12/28/23 1213   BP: (!) 122/57 (!) 106/58 114/60 111/65   BP Location:  Left forearm Left arm Left arm   Patient Position:  Lying Lying Lying   Pulse: 64 85 91 72   Resp: 15 20 16 18   Temp: 98.5 °F (36.9 °C) 98.1 °F (36.7 °C) 98 °F (36.7 °C) 98 °F (36.7 °C)   TempSrc: Oral Oral Oral Oral   SpO2: 100% 96% 95% 99%   Weight:       Height:            Review of Systems  Review of Systems   Constitutional:   Negative for chills and fever.   HENT:  Negative for congestion.    Respiratory:  Negative for cough.    Cardiovascular:  Negative for chest pain and palpitations.   Gastrointestinal:  Negative for nausea and vomiting.        +Abdominal distention   Genitourinary:  Negative for dysuria.   Neurological:  Positive for headaches.   Psychiatric/Behavioral:  The patient is not nervous/anxious.    All other systems reviewed and are negative.      Brief Physical Exam/Reassessment   Physical Exam  Constitutional:       General: She is not in acute distress.     Appearance: Normal appearance. She is not toxic-appearing or diaphoretic.   HENT:      Head: Normocephalic and atraumatic.      Mouth/Throat:      Mouth: Mucous membranes are moist.   Eyes:      General: No scleral icterus.     Extraocular Movements: Extraocular movements intact.      Conjunctiva/sclera: Conjunctivae normal.      Pupils: Pupils are equal, round, and reactive to light.   Cardiovascular:      Rate and Rhythm: Normal rate and regular rhythm.      Pulses: Normal pulses.      Heart sounds: Normal heart sounds.   Pulmonary:      Effort: Pulmonary effort is normal. No respiratory distress.      Breath sounds: Normal breath sounds. No wheezing or rales.   Abdominal:      General: Abdomen is flat. There is distension.      Palpations: Abdomen is soft.      Tenderness: There is no abdominal tenderness.   Musculoskeletal:         General: No swelling or tenderness. Normal range of motion.      Cervical back: Normal range of motion and neck supple. No rigidity or tenderness.   Skin:     General: Skin is warm and dry.   Neurological:      Mental Status: She is alert and oriented to person, place, and time. Mental status is at baseline.      Sensory: No sensory deficit.      Motor: No weakness.         Labs/Imaging   Labs Reviewed   CBC W/ AUTO DIFFERENTIAL - Abnormal; Notable for the following components:       Result Value    WBC 2.50 (*)     RBC 3.07 (*)      Hemoglobin 9.5 (*)     Hematocrit 29.6 (*)     RDW 17.1 (*)     Platelets 91 (*)     Gran # (ANC) 1.2 (*)     Lymph # 0.9 (*)     All other components within normal limits   COMPREHENSIVE METABOLIC PANEL - Abnormal; Notable for the following components:    Sodium 135 (*)     Glucose 118 (*)     Calcium 8.0 (*)     Albumin 2.4 (*)     Total Bilirubin 3.7 (*)     AST 58 (*)     Anion Gap 6 (*)     All other components within normal limits   PROTIME-INR - Abnormal; Notable for the following components:    Prothrombin Time 17.1 (*)     INR 1.6 (*)     All other components within normal limits   BASIC METABOLIC PANEL - Abnormal; Notable for the following components:    Potassium 2.9 (*)     CO2 22 (*)     Calcium 8.1 (*)     All other components within normal limits   CBC W/ AUTO DIFFERENTIAL - Abnormal; Notable for the following components:    WBC 2.34 (*)     RBC 2.85 (*)     Hemoglobin 9.0 (*)     Hematocrit 27.3 (*)     MCH 31.6 (*)     RDW 17.0 (*)     Platelets 78 (*)     Gran # (ANC) 1.0 (*)     All other components within normal limits   LIPASE   URINALYSIS, REFLEX TO URINE CULTURE    Narrative:     Specimen Source->Urine   LACTIC ACID, PLASMA   PROCALCITONIN   MAGNESIUM   PHOSPHORUS   AMMONIA      Imaging Results              X-Ray Chest AP Portable (Final result)  Result time 12/27/23 23:22:46      Final result by Harvey Lisa MD (12/27/23 23:22:46)                   Impression:      Radiographic findings as above.      Electronically signed by: Harvey Lisa  Date:    12/27/2023  Time:    23:22               Narrative:    EXAMINATION:  XR CHEST AP PORTABLE    CLINICAL HISTORY:  Other ascites    TECHNIQUE:  Single frontal view of the chest was performed.    COMPARISON:  Chest radiograph September 26, 2023    FINDINGS:  Single portable chest view is submitted.  There is appearance likely relating to bilateral nipple piercing jewelry.  There is diminished depth of inspiration, there is mild elevation of  the left hemidiaphragm.  When accounting for position and technique and depth of inspiration, the appearance of the cardiomediastinal silhouette is stable.    Accentuation of pulmonary bronchovascular markings consistent with diminished depth of inspiration noted.  Additional atelectatic changes are noted at the lung bases, left greater than right.  There is appearance of may relate to mild superimposed basilar infiltrate.    There is no large pleural effusion, the possibility of minimal pleural fluid at the right costophrenic angle is considered.  There is no pneumothorax.    The osseous structures appear intact with mild chronic change noted.                                       I reviewed all labs, imaging, EKGs.     Plan   Alcoholic Liver Cirrhosis  -Referred to the ED by her hepatologist (Dr. Min) for evaluation of abdominal ascites  -ED work-up shows stable labs from previous. MELD-Na 21 which is similar to prior.   -Afebrile. Normal lactate and procal. Lower suspicion for SBP. Holding ABx.   -IR consulted for therapeutic paracentesis  -Continue Protonix BID for history of esophageal varices. No suspicion for acute GI bleed. H/H stable.  -ETOH abstinence encouraged  -Outpatient follow-up with hepatology      I have discussed this case with Gray Costa PA-C.

## 2023-12-28 NOTE — ED TRIAGE NOTES
Litzy Amato, a 38 y.o. female presents to the ED w/ complaint of liver failure. Seen yesterday by her primary and suggested her to come to the ED for 13 lbs weight gain within last week. Pt was taken off her fluid pill December 5 and since then has gain 30 lbs. Pt endorsing R upper quadrant pain, abdominal swelling. Hx cirrhosis.    Triage note:  Chief Complaint   Patient presents with    Liver Failure     13 lbs of fluid, abd swelling, hx cirrhosis     Review of patient's allergies indicates:  No Known Allergies  Past Medical History:   Diagnosis Date    Unspecified cirrhosis of liver

## 2023-12-28 NOTE — PROCEDURES
Radiology Post-Procedure Note    Pre Op Diagnosis: Ascites  Post Op Diagnosis: Same    Procedure: Ultrasound Guided Paracentesis    Procedure performed by: Lakshmi Spence PA-C    Written Informed Consent Obtained: Yes  Specimen Removed: YES (yellow, clear)  Estimated Blood Loss: Minimal    Findings:   Successful paracentesis.  Albumin administered PRN per protocol.    Patient tolerated procedure well.    Lakshmi Spence PA-C  Interventional Radiology  [phone]

## 2023-12-28 NOTE — PLAN OF CARE
Patient tolerated paracentesis well. VSS. 9L serous ascites drained. No specimens ordered. Albumin 62.5g administered per protocol. Report called to CHAD Vargas.

## 2023-12-28 NOTE — H&P
ED Observation Unit  History and Physical      I assumed care of this patient from the Main ED at onset of observation time, 12:49am on 12/28/2023.       History of Present Illness:    Litzy Amato is a 38 y.o. female with medical history of ETOH Cirrhosis, Esophageal Varices, Portal HTN, Gastric Sleeve presenting to the ED with the chief complaint of abdominal distention.     Reports significant weight gain with abdominal distention over the last month. Reports 30 pound weight gain over the last 3 weeks and 13 pound weight gain over the last week. She had an abdominal US done 2 days ago that showed significant abdominal fluid and advised to come to the ED for evaluation. Reports running out of her Lasix earlier this month and it was not refilled for an unclear reason. Last Drink was in September. Denies confusion. Drove herself to the ED. No melena, hematochezia, melena, hemoptysis, hematemesis. F/B Dr. Min in Hepatology.     In the ED, chronic leukopenia 2.5, H/H 9.5/29.6 (baseline), Tbil 3.7, Alk phos 78, AST 58, ALT 22, Lipase 47. Lactate normal 1.4. Procal 0.04. MELD-Na 19. CXR with no large pleural effusion or consolidation. Received Lasix 80mg IV x1.     I reviewed the ED Provider Note dated 12/28/23 prior to my evaluation of this patient.  I reviewed all labs and imaging performed in the Main ED, prior to patient being placed in Observation. Patient was placed in the ED Observation Unit for <principal problem not specified>.    PMHx   Past Medical History:   Diagnosis Date    Unspecified cirrhosis of liver       No past surgical history on file.     Family Hx   No family history on file.     Social Hx   Social History     Socioeconomic History    Marital status:      Social Determinants of Health     Financial Resource Strain: Medium Risk (9/27/2023)    Overall Financial Resource Strain (CARDIA)     Difficulty of Paying Living Expenses: Somewhat hard   Food Insecurity: No Food Insecurity  (9/27/2023)    Hunger Vital Sign     Worried About Running Out of Food in the Last Year: Never true     Ran Out of Food in the Last Year: Never true   Transportation Needs: Unmet Transportation Needs (9/27/2023)    PRAPARE - Transportation     Lack of Transportation (Medical): Yes     Lack of Transportation (Non-Medical): Yes   Physical Activity: Inactive (9/27/2023)    Exercise Vital Sign     Days of Exercise per Week: 0 days     Minutes of Exercise per Session: 0 min   Stress: Stress Concern Present (9/27/2023)    [employer] [place] [employer] - Occupational Stress Questionnaire     Feeling of Stress : To some extent   Social Connections: Moderately Isolated (9/27/2023)    Social Connection and Isolation Panel [NHANES]     Frequency of Communication with Friends and Family: More than three times a week     Frequency of Social Gatherings with Friends and Family: More than three times a week     Attends [employer] Services: More than 4 times per year     Active Member of Clubs or Organizations: No     Attends Club or Organization Meetings: Never     Marital Status:    Housing Stability: Low Risk  (9/27/2023)    Housing Stability Vital Sign     Unable to Pay for Housing in the Last Year: No     Number of Places Lived in the Last Year: 2     Unstable Housing in the Last Year: No        Vital Signs   Vitals:    12/27/23 2151 12/27/23 2201 12/27/23 2301 12/28/23 0002   BP: 133/81 129/75 117/71 130/71   Pulse: 84 82 92 84   Resp:       Temp:       TempSrc:       SpO2: 100% 100% 97% 100%   Weight:       Height:            Review of Systems  Review of Systems   Gastrointestinal:         +Abdominal distention       Physical Exam  Physical Exam  Constitutional:       General: She is not in acute distress.     Appearance: Normal appearance. She is not toxic-appearing or diaphoretic.   HENT:      Head: Normocephalic and atraumatic.      Mouth/Throat:      Mouth: Mucous membranes are moist.   Eyes:       General: No scleral icterus.     Extraocular Movements: Extraocular movements intact.      Conjunctiva/sclera: Conjunctivae normal.      Pupils: Pupils are equal, round, and reactive to light.   Cardiovascular:      Rate and Rhythm: Normal rate and regular rhythm.      Pulses: Normal pulses.      Heart sounds: Normal heart sounds.   Pulmonary:      Effort: Pulmonary effort is normal. No respiratory distress.      Breath sounds: Normal breath sounds. No wheezing or rales.   Abdominal:      General: Abdomen is flat. There is distension.      Palpations: Abdomen is soft.      Tenderness: There is no abdominal tenderness.   Musculoskeletal:         General: No swelling or tenderness. Normal range of motion.      Cervical back: Normal range of motion and neck supple. No rigidity or tenderness.   Skin:     General: Skin is warm and dry.   Neurological:      Mental Status: She is alert and oriented to person, place, and time. Mental status is at baseline.      Sensory: No sensory deficit.      Motor: No weakness.         Medications:   Scheduled Meds:  Continuous Infusions:  PRN Meds:.    Assessment/Plan:  Alcoholic Liver Cirrhosis  -Referred to the ED by her hepatologist (Dr. Min) for evaluation of abdominal ascites  -ED work-up shows stable labs from previous. MELD-Na 21 which is similar to prior.   -Afebrile. Normal lactate and procal. Lower suspicion for SBP. Holding ABx.   -IR consulted for therapeutic paracentesis  -Continue Protonix BID for history of esophageal varices. No suspicion for acute GI bleed. H/H stable.  -ETOH abstinence encouraged  -Outpatient follow-up with hepatology     Case was discussed with the ED provider, Dr. Tom Robertson and Dr. Goode

## 2023-12-28 NOTE — NURSING
Patient presents for US-guided paracentesis. Patient is awake and alert. See VS Flowsheet. Awaiting evaluation by provider and procedure consent.  Patient declines urine pregnancy test. States not sexually active at this time.

## 2023-12-28 NOTE — DISCHARGE INSTRUCTIONS
Please call with any questions or concerns.      Monday through Friday 8:00 am - 4:30 pm    Interventional Radiology clinic (to schedule a paracentesis)  [phone] or [phone]    After Hours    Ask the  for the Radiology Resident on call  [phone]

## 2023-12-28 NOTE — CONSULTS
Radiology Consult    For IR paracentesis please place order for image guided paracentesis.     Toya Mckenzie

## 2023-12-28 NOTE — ED PROVIDER NOTES
Encounter Date: 12/27/2023       History     Chief Complaint   Patient presents with    Liver Failure     13 lbs of fluid, abd swelling, hx cirrhosis     Litzy Amato is a 38 y.o. female with history of alcohol abuse and recently diagnosed alcoholic cirrhosis who presents today with abdominal distention.  Patient reports that she has recently stopped her Lasix for a trial.  She is new taking furosemide her spironolactone at this time.  She notes that over the last 3 weeks she is gained 30 lb.  She reports a 13 lb weight gain over the past week and contacted her hepatologist who had a repeat ultrasound performed which showed significantly large amount of ascites.  She was instructed to come to the emergency department for evaluation.  Patient denies any nausea, vomiting, fevers, mental status changes, shortness of breath.  She does report mild orthopnea secondary to enlarging abdomen.  She notes abdominal distention with discomfort but denies severe abdominal pain.  No other complaints at this time.    The history is provided by the patient.     Review of patient's allergies indicates:  No Known Allergies  Past Medical History:   Diagnosis Date    Unspecified cirrhosis of liver      No past surgical history on file.  No family history on file.         Physical Exam     Initial Vitals [12/27/23 1851]   BP Pulse Resp Temp SpO2   139/69 98 18 98.8 °F (37.1 °C) 96 %      MAP       --         Physical Exam    Nursing note and vitals reviewed.  Constitutional: She appears ill.   Eyes: Scleral icterus is present.   Cardiovascular:  Normal rate.           Pulmonary/Chest: Breath sounds normal.   Abdominal: She exhibits distension. There is no abdominal tenderness. There is no rebound.     Neurological: She is alert and oriented to person, place, and time.   Skin: Skin is warm and dry. Capillary refill takes less than 2 seconds.         ED Course   Procedures  Labs Reviewed   CBC W/ AUTO DIFFERENTIAL - Abnormal;  Notable for the following components:       Result Value    WBC 2.50 (*)     RBC 3.07 (*)     Hemoglobin 9.5 (*)     Hematocrit 29.6 (*)     RDW 17.1 (*)     Platelets 91 (*)     Gran # (ANC) 1.2 (*)     Lymph # 0.9 (*)     All other components within normal limits   COMPREHENSIVE METABOLIC PANEL - Abnormal; Notable for the following components:    Sodium 135 (*)     Glucose 118 (*)     Calcium 8.0 (*)     Albumin 2.4 (*)     Total Bilirubin 3.7 (*)     AST 58 (*)     Anion Gap 6 (*)     All other components within normal limits   PROTIME-INR - Abnormal; Notable for the following components:    Prothrombin Time 17.1 (*)     INR 1.6 (*)     All other components within normal limits   LIPASE   URINALYSIS, REFLEX TO URINE CULTURE    Narrative:     Specimen Source->Urine   LACTIC ACID, PLASMA   PROCALCITONIN   MAGNESIUM   PHOSPHORUS   AMMONIA          Imaging Results              X-Ray Chest AP Portable (Final result)  Result time 12/27/23 23:22:46      Final result by Harvey Lisa MD (12/27/23 23:22:46)                   Impression:      Radiographic findings as above.      Electronically signed by: Harvey Lisa  Date:    12/27/2023  Time:    23:22               Narrative:    EXAMINATION:  XR CHEST AP PORTABLE    CLINICAL HISTORY:  Other ascites    TECHNIQUE:  Single frontal view of the chest was performed.    COMPARISON:  Chest radiograph September 26, 2023    FINDINGS:  Single portable chest view is submitted.  There is appearance likely relating to bilateral nipple piercing jewelry.  There is diminished depth of inspiration, there is mild elevation of the left hemidiaphragm.  When accounting for position and technique and depth of inspiration, the appearance of the cardiomediastinal silhouette is stable.    Accentuation of pulmonary bronchovascular markings consistent with diminished depth of inspiration noted.  Additional atelectatic changes are noted at the lung bases, left greater than right.  There is  appearance of may relate to mild superimposed basilar infiltrate.    There is no large pleural effusion, the possibility of minimal pleural fluid at the right costophrenic angle is considered.  There is no pneumothorax.    The osseous structures appear intact with mild chronic change noted.                                       Medications   furosemide injection 80 mg (80 mg Intravenous Given 12/27/23 8233)     Medical Decision Making  38-year-old female history of ascites coming in with worsening abdominal distention sent in for evaluation.  On examination patient is alert oriented in no acute distress.  Patient is afebrile, vitals within normal limits.  Patient has a% on room air.  Patient has significant ascites causing abdominal discomfort.  No significant tenderness with low suspicion for SBP.  Labs obtained hyperbilirubinemia stable, elevated INR stable from prior.  No gross derangements.  At this time discussed with patient and feel that patient is appropriate for a therapeutic paracentesis.  At this time will admit to ED observation unit with plan for therapeutic paracentesis.     Amount and/or Complexity of Data Reviewed  Radiology: ordered.    Risk  Prescription drug management.                                      Clinical Impression:  Final diagnoses:  [R18.8] Ascites          ED Disposition Condition    Observation                 Tom Robertson MD  Resident  12/28/23 2801

## 2023-12-28 NOTE — FIRST PROVIDER EVALUATION
Emergency Department TeleTriage Encounter Note      CHIEF COMPLAINT    Chief Complaint   Patient presents with    Liver Failure     13 lbs of fluid, abd swelling, hx cirrhosis       VITAL SIGNS   Initial Vitals [12/27/23 1851]   BP Pulse Resp Temp SpO2   139/69 98 18 98.8 °F (37.1 °C) 96 %      MAP       --            ALLERGIES    Review of patient's allergies indicates:  No Known Allergies    PROVIDER TRIAGE NOTE  37 yo F with Cirrhosis to the ED with abd pain and weight gain. Appears unwell on exam. Had labs and an US done recently and was referred to the ED.       ORDERS  Labs Reviewed - No data to display    ED Orders (720h ago, onward)      None              Virtual Visit Note: The provider triage portion of this emergency department evaluation and documentation was performed via [employer], a HIPAA-compliant telemedicine application, in concert with a tele-presenter in the room. A face to face patient evaluation with one of my colleagues will occur once the patient is placed in an emergency department room.      DISCLAIMER: This note was prepared with [employer] voice recognition transcription software. Garbled syntax, mangled pronouns, and other bizarre constructions may be attributed to that software system.

## 2023-12-29 NOTE — DISCHARGE SUMMARY
ED Observation Unit  Discharge Summary        History of Present Illness:    38 y.o. female with medical history of ETOH Cirrhosis, Esophageal Varices, Portal HTN, Gastric Sleeve presenting to the ED with the chief complaint of abdominal distention.      Reports significant weight gain with abdominal distention over the last month. Reports 30 pound weight gain over the last 3 weeks and 13 pound weight gain over the last week. She had an abdominal US done 2 days ago that showed significant abdominal fluid and advised to come to the ED for evaluation. Reports running out of her Lasix earlier this month and it was not refilled for an unclear reason. Last Drink was in September. Denies confusion. Drove herself to the ED. No melena, hematochezia, melena, hemoptysis, hematemesis. F/B Dr. Min in Hepatology.         Observation Course:     Received 1 dose of IV lasix in the ED.  IR consulted and they performed therapeutic paracentesis, 9L removed. She tolerated well.  Repeat abdominal exam, soft, nontender. She is tolerating diet.   Stable for discharge home.    Consultants:    Interventional Radiology    Final Diagnosis:  Ascites, Alcoholic Liver Cirrhosis    Discharge Condition: Good    Disposition: Home or Self Care     Time spent on the discharge of the patient including review of hospital course with the patient. reviewing discharge medications and arranging follow-up care 35 minutes.  Patient was seen and examined on the date of discharge and determined to be suitable for discharge.    Follow Up:  Future Appointments   Date Time Provider Department Center   1/16/2024 12:00 PM LABORATORY, [facility] [place]   2/12/2024  3:00 PM LAB, APPOINTMENT [facility] LAB P [facility]   2/12/2024  3:30 PM Magalis Min MD [facility] HEPAT [facility]   3/11/2024  7:45 AM [facility] [place]   3/11/2024 12:00 PM LABORATORY, [facility] [place]   4/8/2024 12:00 PM LABORATORY, [facility] [place]      She was discharged with prescriptions for spironolactone and lasix (refills of prescriptions she was previously on).  She will follow up with her hepatologist, Dr Min.  Strict ED return precautions given.  All of the patient's questions were answered.

## 2023-12-29 NOTE — PROGRESS NOTES
ED Observation Unit  Progress Note      HPI   Litzy Amato is a 38 y.o. female with medical history of ETOH Cirrhosis, Esophageal Varices, Portal HTN, Gastric Sleeve presenting to the ED with the chief complaint of abdominal distention.      Reports significant weight gain with abdominal distention over the last month. Reports 30 pound weight gain over the last 3 weeks and 13 pound weight gain over the last week. She had an abdominal US done 2 days ago that showed significant abdominal fluid and advised to come to the ED for evaluation. Reports running out of her Lasix earlier this month and it was not refilled for an unclear reason. Last Drink was in September. Denies confusion. Drove herself to the ED. No melena, hematochezia, melena, hemoptysis, hematemesis. F/B Dr. Min in Hepatology.      Interval History   Paracentesis performed by IR and 9L fluid removed. She tolerated well and is feeling much better. Tolerating a diet. Abdomen soft, nontender.   Stable for discharge home.     PMHx   Past Medical History:   Diagnosis Date    Unspecified cirrhosis of liver       No past surgical history on file.     Family Hx   No family history on file.     Social Hx   Social History     Socioeconomic History    Marital status:      Social Determinants of Health     Financial Resource Strain: Medium Risk (9/27/2023)    Overall Financial Resource Strain (CARDIA)     Difficulty of Paying Living Expenses: Somewhat hard   Food Insecurity: No Food Insecurity (9/27/2023)    Hunger Vital Sign     Worried About Running Out of Food in the Last Year: Never true     Ran Out of Food in the Last Year: Never true   Transportation Needs: Unmet Transportation Needs (9/27/2023)    PRAPARE - Transportation     Lack of Transportation (Medical): Yes     Lack of Transportation (Non-Medical): Yes   Physical Activity: Inactive (9/27/2023)    Exercise Vital Sign     Days of Exercise per Week: 0 days     Minutes of Exercise per  Session: 0 min   Stress: Stress Concern Present (9/27/2023)    [employer] - Occupational Stress Questionnaire     Feeling of Stress : To some extent   Social Connections: Moderately Isolated (9/27/2023)    Social Connection and Isolation Panel [NHANES]     Frequency of Communication with Friends and Family: More than three times a week     Frequency of Social Gatherings with Friends and Family: More than three times a week     Attends Yarsanism Services: More than 4 times per year     Active Member of Clubs or Organizations: No     Attends Club or Organization Meetings: Never     Marital Status:    Housing Stability: Low Risk  (9/27/2023)    Housing Stability Vital Sign     Unable to Pay for Housing in the Last Year: No     Number of Places Lived in the Last Year: 2     Unstable Housing in the Last Year: No        Vital Signs   Vitals:    12/28/23 1213 12/28/23 1426 12/28/23 1530 12/28/23 1755   BP: 111/65 108/60 109/61 110/65   BP Location: Left arm   Right arm   Patient Position: Lying   Lying   Pulse: 72 72 75 75   Resp: 18 18 16 16   Temp: 98 °F (36.7 °C)   98.1 °F (36.7 °C)   TempSrc: Oral   Oral   SpO2: 99% 98% 99% 99%   Weight:       Height:            Review of Systems  ROS    Brief Physical Exam/Reassessment   Physical Exam  Vitals and nursing note reviewed.   HENT:      Head: Normocephalic and atraumatic.   Cardiovascular:      Rate and Rhythm: Normal rate.   Abdominal:      General: Abdomen is flat.      Palpations: Abdomen is soft.      Tenderness: There is no abdominal tenderness. There is no guarding or rebound.   Skin:     General: Skin is warm and dry.   Neurological:      Mental Status: She is alert and oriented to person, place, and time.   Psychiatric:         Mood and Affect: Mood normal.         Labs/Imaging   Labs Reviewed   CBC W/ AUTO DIFFERENTIAL - Abnormal; Notable for the following components:       Result Value    WBC 2.50 (*)     RBC 3.07 (*)      Hemoglobin 9.5 (*)     Hematocrit 29.6 (*)     RDW 17.1 (*)     Platelets 91 (*)     Gran # (ANC) 1.2 (*)     Lymph # 0.9 (*)     All other components within normal limits   COMPREHENSIVE METABOLIC PANEL - Abnormal; Notable for the following components:    Sodium 135 (*)     Glucose 118 (*)     Calcium 8.0 (*)     Albumin 2.4 (*)     Total Bilirubin 3.7 (*)     AST 58 (*)     Anion Gap 6 (*)     All other components within normal limits   PROTIME-INR - Abnormal; Notable for the following components:    Prothrombin Time 17.1 (*)     INR 1.6 (*)     All other components within normal limits   BASIC METABOLIC PANEL - Abnormal; Notable for the following components:    Potassium 2.9 (*)     CO2 22 (*)     Calcium 8.1 (*)     All other components within normal limits   CBC W/ AUTO DIFFERENTIAL - Abnormal; Notable for the following components:    WBC 2.34 (*)     RBC 2.85 (*)     Hemoglobin 9.0 (*)     Hematocrit 27.3 (*)     MCH 31.6 (*)     RDW 17.0 (*)     Platelets 78 (*)     Gran # (ANC) 1.0 (*)     All other components within normal limits   LIPASE   URINALYSIS, REFLEX TO URINE CULTURE    Narrative:     Specimen Source->Urine   LACTIC ACID, PLASMA   PROCALCITONIN   MAGNESIUM   PHOSPHORUS   AMMONIA      Imaging Results              IR Paracentesis with Imaging (Preliminary result)  Result time 12/28/23 15:48:12      Preliminary result by Lakshmi Spence PA-C (12/28/23 15:48:12)                   Impression:      Ultrasound-guided paracentesis with drainage of 9000 mL of serous fluid.    _______________________________________________________________    Electronically signed by resident: Lakshmi Spence  Date:    12/28/2023  Time:    15:46                 Narrative:    EXAMINATION:  Ultrasound-guided paracentesis    Procedural Personnel    Attending physician(s): Toya Mckenzie MD    Fellow physician(s): None    Resident physician(s): None    Advanced practice provider(s): Lakshmi Spence PA-C    Pre-procedure diagnosis:  Ascites    Post-procedure diagnosis: Same    Complications: No immediate complications.    CLINICAL HISTORY:  Recurrent Ascites    TECHNIQUE:  - Ultrasound-guided paracentesis    COMPARISON:  Paracentesis 9/26/2023    FINDINGS:  Pre-procedure    Consent: Informed consent for the procedure was obtained and time-out was performed prior to the procedure.    Preparation: The site was prepared and draped using maximal sterile barrier technique including cutaneous antisepsis.    Anesthesia/sedation    Level of anesthesia/sedation: No sedation    Anesthesia/sedation administered by: Not applicable    Total intra-service sedation time (minutes): 0    Limited abdominal ultrasound    Limited abdominal ultrasound was performed.    Large ascites. A safe window for paracentesis was identified.    Paracentesis    Local anesthesia was administered. The peritoneal cavity was accessed on the left lower quadrant and fluid return confirmed position. Ascites was drained.    Paracentesis access technique: Real-time ultrasound guidance    Catheter placed: 5F one step    Closure    The catheter was removed. A sterile bandage was applied.    Post-drainage ultrasound: Not performed    Additional Details    Additional description of procedure: None    Equipment details: None    Specimens removed: Abdominal fluid    Estimated blood loss (mL): Less than 10    Standardized report: [email or web address]_Paracentesis_[email or web address]    Attestation    Signer name:    I attest that I reviewed the stored images and agree with the report as written.                                       X-Ray Chest AP Portable (Final result)  Result time 12/27/23 23:22:46      Final result by Harvey Lisa MD (12/27/23 23:22:46)                   Impression:      Radiographic findings as above.      Electronically signed by: Harvey Lisa  Date:    12/27/2023  Time:    23:22               Narrative:    EXAMINATION:  XR CHEST AP PORTABLE    CLINICAL HISTORY:  Other  ascites    TECHNIQUE:  Single frontal view of the chest was performed.    COMPARISON:  Chest radiograph September 26, 2023    FINDINGS:  Single portable chest view is submitted.  There is appearance likely relating to bilateral nipple piercing jewelry.  There is diminished depth of inspiration, there is mild elevation of the left hemidiaphragm.  When accounting for position and technique and depth of inspiration, the appearance of the cardiomediastinal silhouette is stable.    Accentuation of pulmonary bronchovascular markings consistent with diminished depth of inspiration noted.  Additional atelectatic changes are noted at the lung bases, left greater than right.  There is appearance of may relate to mild superimposed basilar infiltrate.    There is no large pleural effusion, the possibility of minimal pleural fluid at the right costophrenic angle is considered.  There is no pneumothorax.    The osseous structures appear intact with mild chronic change noted.                                       I reviewed all labs, imaging.     Plan   Alcoholic Liver Cirrhosis  -Referred to the ED by her hepatologist (Dr. Min) for evaluation of abdominal ascites  -ED work-up shows stable labs from previous. MELD-Na 21 which is similar to prior.   -Afebrile. Normal lactate and procal. Lower suspicion for SBP. Holding ABx.   -IR consulted for therapeutic paracentesis - 9L removed, tolerated well  -Continue Protonix BID for history of esophageal varices. No suspicion for acute GI bleed. H/H stable.  -ETOH abstinence encouraged  -Outpatient follow-up with hepatology \  -stable for discharge home        I have discussed this case with previous EDOU provider, Marisa Shaw PA-C.

## 2024-01-31 ENCOUNTER — APPOINTMENT (OUTPATIENT)
Dept: RADIOLOGY | Facility: HOSPITAL | Age: 39
End: 2024-01-31
Attending: INTERNAL MEDICINE
Payer: MEDICAID

## 2024-01-31 DIAGNOSIS — K70.9 ALCOHOLIC LIVER DISEASE: ICD-10-CM

## 2024-01-31 PROCEDURE — 76705 ECHO EXAM OF ABDOMEN: CPT | Mod: 26,,, | Performed by: RADIOLOGY

## 2024-01-31 PROCEDURE — 76705 ECHO EXAM OF ABDOMEN: CPT | Mod: TC,PO

## 2024-02-12 ENCOUNTER — TELEPHONE (OUTPATIENT)
Dept: HEPATOLOGY | Facility: CLINIC | Age: 39
End: 2024-02-12
Payer: MEDICAID

## 2024-02-12 ENCOUNTER — OFFICE VISIT (OUTPATIENT)
Dept: HEPATOLOGY | Facility: CLINIC | Age: 39
End: 2024-02-12
Payer: MEDICAID

## 2024-02-12 DIAGNOSIS — R60.0 EDEMA OF BOTH LEGS: Primary | ICD-10-CM

## 2024-02-12 PROCEDURE — 99213 OFFICE O/P EST LOW 20 MIN: CPT | Mod: 95,,, | Performed by: INTERNAL MEDICINE

## 2024-02-12 RX ORDER — SPIRONOLACTONE 100 MG/1
100 TABLET, FILM COATED ORAL DAILY
Qty: 90 TABLET | Refills: 3 | Status: SHIPPED | OUTPATIENT
Start: 2024-02-12 | End: 2024-04-29 | Stop reason: SDUPTHER

## 2024-02-12 RX ORDER — FUROSEMIDE 40 MG/1
40 TABLET ORAL DAILY
Qty: 90 TABLET | Refills: 3 | Status: SHIPPED | OUTPATIENT
Start: 2024-02-12 | End: 2025-02-11

## 2024-02-12 NOTE — TELEPHONE ENCOUNTER
----- Message from Magalis Min MD sent at 2/5/2024  4:20 PM CST -----  Please inform patient:  Ultrasound showed findings of cirrhosis and small amount of fluid in the abdomen.  In the gallbladder there is some thickened bile.  No tumor seen in the liver.    Repeat ultrasound in 6 months with alpha fetoprotein.

## 2024-02-12 NOTE — TELEPHONE ENCOUNTER
Returned call again, she has been without Lasix for 2 weeks and is retaining fluid. She was told to follow up . Was scheduled and then she rescheduled herself because she did not want to come to the city with [place]. I assured her she should always keep appts when meds are needed. She states she is swollen. Recently had labs and u/s  The er gave her lasix to get by but is out  ----- Message from Patrick Cai sent at 2/12/2024 11:44 AM CST -----  Regarding: call back  Pt returning [place] call    Call

## 2024-02-12 NOTE — TELEPHONE ENCOUNTER
Tried calling again went to v/m and left a message asking her to call back----- Message from Mica Lamar sent at 2/12/2024  8:12 AM CST -----  Regarding: Speak to staff/ possible parancentesis  Contact: Regina  Regarding: Speak to staff        Name Of Caller: Litzy Amato          Contact Preference: [phone] (home)          Nature of call:  pt is calling to speak to staff regarding coming to see the doctor ASAP was suppose to come in on today , but rescheduled, now wanting to see if she can because she have gained about 32 pounds  within the last 2 weeks and 8 lbs since Saturday. Pt states she is pretty miserable. Please advise. Requesting a call back.

## 2024-02-12 NOTE — PATIENT INSTRUCTIONS
Plan:  -  No More Alcohol, ever ever.  Patient agrees.   -  Labs every 4 weeks x 3 starting mid March 2024, then every 3 months:  CBC, CMP, PT INR  -  Ultrasound of abdomen and AFP every 6 months, next due in March 2024  -  Low salt in the diet, avoid canned, bottled and processed foods.  -  Continue current meds  -  Avoid alcohol, smoking, sedatives and meds with codeine.  -  Avoid high intake of Tylenol (more than 4 extra-strength pills in one day)  -  Call us if any bleeding, fevers, confusion, disorientation occur  -  Endoscopy: past EGD at [facility]. - follow up with same doctor.  -  Transplant option discussed, will evaluate if liver shows no improvement, labs today.   -  Return in 3 months.

## 2024-02-12 NOTE — H&P (VIEW-ONLY)
HEPATOLOGY FOLLOW UP    Litzy Amato is here for follow up of No chief complaint on file.      HPI  38 y.o. female with No chief complaint on file.  Patient admitted 9/25/23 and discharged 10/2/23, here for post-hospital discharge  follow-up.     Has a history of Alcohol-related liver disease, gastric sleeve surgery, one episode of SBP, one paracentesis (6 L) with albumin infusion, 2 uPRBCs for anemia, eso varices, nonbleeding.  She was discharged but redeveloped abd distention, had some hematochezia, and intermittent melena, was noted to be hypotensive, needing readmission on 9/25/23.  Para repeated (3 L), no SBP found this time. Hgb 6.6, was given I U PRBCs, CT A/P findings of cirrhosis with portal hypertension, anasarca.    Today 12/19/24:  Lost a lot of wt, 73 lb in 3 months.  Edema in the lower extremities is gone.  She feels abd is still distended, but exam of abdomen reveals thick and wall.      Has not touched alcohol since 9/2/2024.  Prior to that she had quit drinking but after her 's death on 6/5/23, from a heart attack, aneurysm, she started drinking again.  Has no children.   Has a pretty big family, but lost 5 family members in 6 months (April to Sept 2024).  3 of the family members had CA (pancreatic, liver, grandPa CA unknown).    Discharge Meds include diuretics lasix 40 and spironolactone 100 daily, lactulose, pantoprazole, miralax,.            Outpatient Encounter Medications as of 2/12/2024   Medication Sig Dispense Refill    docusate sodium (COLACE ORAL) Take 100 mg by mouth.      ELDERBERRY FRUIT ORAL Take by mouth.      furosemide (LASIX) 40 MG tablet Take 1 tablet (40 mg total) by mouth once daily. 30 tablet 0    iron, carbonyl (FEOSOL) 45 mg Tab Take 325 mg by mouth.      lactulose (CHRONULAC) 10 gram/15 mL solution Take 23 mLs (15 g total) by mouth 3 (three) times daily. 300 mL 0    nitrofurantoin, macrocrystal-monohydrate, (MACROBID) 100 MG capsule Take 100 mg by mouth.       omega-3 fatty acids/fish oil (FISH OIL-OMEGA-3 FATTY ACIDS) 300-1,000 mg capsule Take 500 mg by mouth.      pantoprazole (PROTONIX) 40 MG tablet Take 1 tablet (40 mg total) by mouth once daily. 30 tablet 0    PNV cmb#95-ferrous fumarate-FA (PRENATAL MULTIVITAMINS) 28 mg iron- 800 mcg Tab Take by mouth.      polyethylene glycol (GLYCOLAX) 17 gram/dose powder Use cap to measure out (17 g) mix with a liquid and take by mouth daily as needed (constipation). 510 g 0    spironolactone (ALDACTONE) 100 MG tablet Take 1 tablet (100 mg total) by mouth once daily. 30 tablet 0     No facility-administered encounter medications on file as of 2/12/2024.     Review of patient's allergies indicates:  No Known Allergies  Past Medical History:   Diagnosis Date    Unspecified cirrhosis of liver      No past surgical history on file.  Social History     Socioeconomic History    Marital status:      Social Determinants of Health     Financial Resource Strain: Medium Risk (1/14/2024)    Overall Financial Resource Strain (CARDIA)     Difficulty of Paying Living Expenses: Somewhat hard   Food Insecurity: Food Insecurity Present (1/14/2024)    Hunger Vital Sign     Worried About Running Out of Food in the Last Year: Often true     Ran Out of Food in the Last Year: Often true   Transportation Needs: Unmet Transportation Needs (1/14/2024)    PRAPARE - Transportation     Lack of Transportation (Medical): Yes     Lack of Transportation (Non-Medical): Yes   Physical Activity: Inactive (1/14/2024)    Exercise Vital Sign     Days of Exercise per Week: 0 days     Minutes of Exercise per Session: 0 min   Stress: Stress Concern Present (1/14/2024)    [employer] [place] [employer] - Occupational Stress Questionnaire     Feeling of Stress : Rather much   Social Connections: Moderately Integrated (1/14/2024)    Social Connection and Isolation Panel [NHANES]     Frequency of Communication with Friends and Family: More than three  times a week     Frequency of Social Gatherings with Friends and Family: Twice a week     Attends Jehovah's witness Services: More than 4 times per year     Active Member of Clubs or Organizations: Yes     Attends Club or Organization Meetings: More than 4 times per year     Marital Status:    Housing Stability: High Risk (1/14/2024)    Housing Stability Vital Sign     Unable to Pay for Housing in the Last Year: Yes     Number of Places Lived in the Last Year: 3     Unstable Housing in the Last Year: Yes       ROS  Gen- no wt loss, gain, fever, chills  HEENT - no congestion, nosebleed, visual or hearing problems  Chest - no cough, shortness of breath, chest pain  Cardiovascular- no chest pain, leg swelling, dyspnea on exertion or at rest, orthopnea  GI- no abdominal pain, nausea, vomiting, constipation, or diarrhea   Musculoskeletal:  no pain or swelling of joints  Neuro - no headaches, dizziness, weakness, tingling numbness in hands or feet,  Skin- no rash, itching  Psych - no depression, anxiety, sleep disturbance, memory loss    There were no vitals filed for this visit.      Physical Exam:  General Appearance: Well appearing in no acute distress  Head:   Normocephalic, without obvious abnormality  Eyes:    No scleral icterus, EOMI  ENT: Neck supple, Lips, mucosa, and tongue normal; teeth and gums normal  Lungs: CTA bilaterally in anterior and posterior fields, no wheezes, no crackles.  Heart:  Regular rate and rhythm, S1, S2 normal, no murmurs heard  Abdomen: Soft, non tender, non distended with positive bowel sounds in all four quadrants. No hepatosplenomegaly, ascites, or mass  Extremities: 2+ pulses, no clubbing, cyanosis or edema  Skin: No rash  Neurologic: CN II-XII intact, alert, oriented x 3, no asterixis.     Lab Results   Component Value Date    GLU 91 01/31/2024    BUN 12 01/31/2024    CREATININE 0.7 01/31/2024    CALCIUM 8.4 (L) 01/31/2024     (L) 01/31/2024    K 4.0 01/31/2024      01/31/2024    PROT 7.1 01/31/2024    CO2 24 01/31/2024    ANIONGAP 4 (L) 01/31/2024    WBC 2.49 (L) 01/31/2024    RBC 2.68 (L) 01/31/2024    HGB 8.1 (L) 01/31/2024    HCT 25.3 (L) 01/31/2024    MCV 94 01/31/2024    MCH 30.2 01/31/2024    MCHC 32.0 01/31/2024     Lab Results   Component Value Date    RDW 18.0 (H) 01/31/2024    PLT 86 (L) 01/31/2024    MPV SEE COMMENT 01/31/2024    GRAN 1.1 (L) 01/31/2024    GRAN 46.1 01/31/2024    LYMPH 1.0 01/31/2024    LYMPH 39.1 01/31/2024    MONO 0.2 (L) 01/31/2024    MONO 9.9 01/31/2024    EOSINOPHIL 3.7 01/31/2024    BASOPHIL 0.8 01/31/2024    EOS 0.1 01/31/2024    BASO 0.02 01/31/2024    APTT 29.6 09/05/2021    GROUPTRH O NEG 09/28/2023     (H) 09/25/2023    ALBUMIN 2.5 (L) 01/31/2024    BILIDIR 2.4 (H) 09/27/2023    AST 54 (H) 01/31/2024    ALT 21 01/31/2024    ALKPHOS 60 01/31/2024    MG 1.7 12/27/2023    LABPROT 15.6 (H) 01/31/2024    INR 1.5 (H) 01/31/2024       Diagnostics:     Assessment   No diagnosis found.    Alcoholic cirrhosis, ascites has improved, edema was gone, lost 73 lb in 3 months.  Now, ran out of pills, and regained 32 lb in 18 days.    Abstinent since 9/2/23.   Have told her Never to drink alcohol again.        Plan:  -  No More Alcohol, ever ever.  Patient agrees.   -  Labs every 4 weeks x 3 starting mid March 2024, then every 3 months:  CBC, CMP, PT INR  -  Ultrasound of abdomen and AFP every 6 months, next due in March 2024  -  Low salt in the diet, avoid canned, bottled and processed foods.  -  Continue current meds  -  Avoid alcohol, smoking, sedatives and meds with codeine.  -  Avoid high intake of Tylenol (more than 4 extra-strength pills in one day)  -  Call us if any bleeding, fevers, confusion, disorientation occur  -  Endoscopy: past EGD at [facility]. - follow up with same doctor.  -  Transplant option discussed, will evaluate if liver shows no improvement, labs today.   -  Return in 3 months.     Magalis Min,  MD  Hepatology. [facility]

## 2024-02-12 NOTE — Clinical Note
Plan: -  No More Alcohol, ever ever.  Patient agrees.  -  Labs every 4 weeks x 3 starting mid March 2024, then every 3 months:  CBC, CMP, PT INR -  Ultrasound of abdomen and AFP every 6 months, next due in March 2024 -  Low salt in the diet, avoid canned, bottled and processed foods. -  Continue current meds -  Avoid alcohol, smoking, sedatives and meds with codeine. -  Avoid high intake of Tylenol (more than 4 extra-strength pills in one day) -  Call us if any bleeding, fevers, confusion, disorientation occur -  Endoscopy: past EGD at [facility]. - follow up with same doctor. -  Transplant option discussed, will evaluate if liver shows no improvement, labs today.  -  Return in 3 months.

## 2024-02-12 NOTE — PROGRESS NOTES
HEPATOLOGY FOLLOW UP    Litzy Amato is here for follow up of No chief complaint on file.      HPI  38 y.o. female with No chief complaint on file.  Patient admitted 9/25/23 and discharged 10/2/23, here for post-hospital discharge  follow-up.     Has a history of Alcohol-related liver disease, gastric sleeve surgery, one episode of SBP, one paracentesis (6 L) with albumin infusion, 2 uPRBCs for anemia, eso varices, nonbleeding.  She was discharged but redeveloped abd distention, had some hematochezia, and intermittent melena, was noted to be hypotensive, needing readmission on 9/25/23.  Para repeated (3 L), no SBP found this time. Hgb 6.6, was given I U PRBCs, CT A/P findings of cirrhosis with portal hypertension, anasarca.    Today 12/19/24:  Lost a lot of wt, 73 lb in 3 months.  Edema in the lower extremities is gone.  She feels abd is still distended, but exam of abdomen reveals thick and wall.      Has not touched alcohol since 9/2/2024.  Prior to that she had quit drinking but after her 's death on 6/5/23, from a heart attack, aneurysm, she started drinking again.  Has no children.   Has a pretty big family, but lost 5 family members in 6 months (April to Sept 2024).  3 of the family members had CA (pancreatic, liver, grandPa CA unknown).    Discharge Meds include diuretics lasix 40 and spironolactone 100 daily, lactulose, pantoprazole, miralax,.            Outpatient Encounter Medications as of 2/12/2024   Medication Sig Dispense Refill    docusate sodium (COLACE ORAL) Take 100 mg by mouth.      ELDERBERRY FRUIT ORAL Take by mouth.      furosemide (LASIX) 40 MG tablet Take 1 tablet (40 mg total) by mouth once daily. 30 tablet 0    iron, carbonyl (FEOSOL) 45 mg Tab Take 325 mg by mouth.      lactulose (CHRONULAC) 10 gram/15 mL solution Take 23 mLs (15 g total) by mouth 3 (three) times daily. 300 mL 0    nitrofurantoin, macrocrystal-monohydrate, (MACROBID) 100 MG capsule Take 100 mg by mouth.       omega-3 fatty acids/fish oil (FISH OIL-OMEGA-3 FATTY ACIDS) 300-1,000 mg capsule Take 500 mg by mouth.      pantoprazole (PROTONIX) 40 MG tablet Take 1 tablet (40 mg total) by mouth once daily. 30 tablet 0    PNV cmb#95-ferrous fumarate-FA (PRENATAL MULTIVITAMINS) 28 mg iron- 800 mcg Tab Take by mouth.      polyethylene glycol (GLYCOLAX) 17 gram/dose powder Use cap to measure out (17 g) mix with a liquid and take by mouth daily as needed (constipation). 510 g 0    spironolactone (ALDACTONE) 100 MG tablet Take 1 tablet (100 mg total) by mouth once daily. 30 tablet 0     No facility-administered encounter medications on file as of 2/12/2024.     Review of patient's allergies indicates:  No Known Allergies  Past Medical History:   Diagnosis Date    Unspecified cirrhosis of liver      No past surgical history on file.  Social History     Socioeconomic History    Marital status:      Social Determinants of Health     Financial Resource Strain: Medium Risk (1/14/2024)    Overall Financial Resource Strain (CARDIA)     Difficulty of Paying Living Expenses: Somewhat hard   Food Insecurity: Food Insecurity Present (1/14/2024)    Hunger Vital Sign     Worried About Running Out of Food in the Last Year: Often true     Ran Out of Food in the Last Year: Often true   Transportation Needs: Unmet Transportation Needs (1/14/2024)    PRAPARE - Transportation     Lack of Transportation (Medical): Yes     Lack of Transportation (Non-Medical): Yes   Physical Activity: Inactive (1/14/2024)    Exercise Vital Sign     Days of Exercise per Week: 0 days     Minutes of Exercise per Session: 0 min   Stress: Stress Concern Present (1/14/2024)    [employer] - Occupational Stress Questionnaire     Feeling of Stress : Rather much   Social Connections: Moderately Integrated (1/14/2024)    Social Connection and Isolation Panel [NHANES]     Frequency of Communication with Friends and Family: More than three  times a week     Frequency of Social Gatherings with Friends and Family: Twice a week     Attends Sabianism Services: More than 4 times per year     Active Member of Clubs or Organizations: Yes     Attends Club or Organization Meetings: More than 4 times per year     Marital Status:    Housing Stability: High Risk (1/14/2024)    Housing Stability Vital Sign     Unable to Pay for Housing in the Last Year: Yes     Number of Places Lived in the Last Year: 3     Unstable Housing in the Last Year: Yes       ROS  Gen- no wt loss, gain, fever, chills  HEENT - no congestion, nosebleed, visual or hearing problems  Chest - no cough, shortness of breath, chest pain  Cardiovascular- no chest pain, leg swelling, dyspnea on exertion or at rest, orthopnea  GI- no abdominal pain, nausea, vomiting, constipation, or diarrhea   Musculoskeletal:  no pain or swelling of joints  Neuro - no headaches, dizziness, weakness, tingling numbness in hands or feet,  Skin- no rash, itching  Psych - no depression, anxiety, sleep disturbance, memory loss    There were no vitals filed for this visit.      Physical Exam:  General Appearance: Well appearing in no acute distress  Head:   Normocephalic, without obvious abnormality  Eyes:    No scleral icterus, EOMI  ENT: Neck supple, Lips, mucosa, and tongue normal; teeth and gums normal  Lungs: CTA bilaterally in anterior and posterior fields, no wheezes, no crackles.  Heart:  Regular rate and rhythm, S1, S2 normal, no murmurs heard  Abdomen: Soft, non tender, non distended with positive bowel sounds in all four quadrants. No hepatosplenomegaly, ascites, or mass  Extremities: 2+ pulses, no clubbing, cyanosis or edema  Skin: No rash  Neurologic: CN II-XII intact, alert, oriented x 3, no asterixis.     Lab Results   Component Value Date    GLU 91 01/31/2024    BUN 12 01/31/2024    CREATININE 0.7 01/31/2024    CALCIUM 8.4 (L) 01/31/2024     (L) 01/31/2024    K 4.0 01/31/2024      01/31/2024    PROT 7.1 01/31/2024    CO2 24 01/31/2024    ANIONGAP 4 (L) 01/31/2024    WBC 2.49 (L) 01/31/2024    RBC 2.68 (L) 01/31/2024    HGB 8.1 (L) 01/31/2024    HCT 25.3 (L) 01/31/2024    MCV 94 01/31/2024    MCH 30.2 01/31/2024    MCHC 32.0 01/31/2024     Lab Results   Component Value Date    RDW 18.0 (H) 01/31/2024    PLT 86 (L) 01/31/2024    MPV SEE COMMENT 01/31/2024    GRAN 1.1 (L) 01/31/2024    GRAN 46.1 01/31/2024    LYMPH 1.0 01/31/2024    LYMPH 39.1 01/31/2024    MONO 0.2 (L) 01/31/2024    MONO 9.9 01/31/2024    EOSINOPHIL 3.7 01/31/2024    BASOPHIL 0.8 01/31/2024    EOS 0.1 01/31/2024    BASO 0.02 01/31/2024    APTT 29.6 09/05/2021    GROUPTRH O NEG 09/28/2023     (H) 09/25/2023    ALBUMIN 2.5 (L) 01/31/2024    BILIDIR 2.4 (H) 09/27/2023    AST 54 (H) 01/31/2024    ALT 21 01/31/2024    ALKPHOS 60 01/31/2024    MG 1.7 12/27/2023    LABPROT 15.6 (H) 01/31/2024    INR 1.5 (H) 01/31/2024       Diagnostics:     Assessment   No diagnosis found.    Alcoholic cirrhosis, ascites has improved, edema was gone, lost 73 lb in 3 months.  Now, ran out of pills, and regained 32 lb in 18 days.    Abstinent since 9/2/23.   Have told her Never to drink alcohol again.        Plan:  -  No More Alcohol, ever ever.  Patient agrees.   -  Labs every 4 weeks x 3 starting mid March 2024, then every 3 months:  CBC, CMP, PT INR  -  Ultrasound of abdomen and AFP every 6 months, next due in March 2024  -  Low salt in the diet, avoid canned, bottled and processed foods.  -  Continue current meds  -  Avoid alcohol, smoking, sedatives and meds with codeine.  -  Avoid high intake of Tylenol (more than 4 extra-strength pills in one day)  -  Call us if any bleeding, fevers, confusion, disorientation occur  -  Endoscopy: past EGD at [facility]. - follow up with same doctor.  -  Transplant option discussed, will evaluate if liver shows no improvement, labs today.   -  Return in 3 months.     Magalis Min,  MD  Hepatology. [facility]

## 2024-02-14 ENCOUNTER — TELEPHONE (OUTPATIENT)
Dept: HEPATOLOGY | Facility: CLINIC | Age: 39
End: 2024-02-14
Payer: MEDICAID

## 2024-02-14 NOTE — TELEPHONE ENCOUNTER
----- Message from Magalis Min MD sent at 2/12/2024  3:22 PM CST -----  Plan:  -  No More Alcohol, ever ever.  Patient agrees.   -  Labs every 4 weeks x 3 starting mid March 2024, then every 3 months:  CBC, CMP, PT INR  -  Ultrasound of abdomen and AFP every 6 months, next due in March 2024  -  Low salt in the diet, avoid canned, bottled and processed foods.  -  Continue current meds  -  Avoid alcohol, smoking, sedatives and meds with codeine.  -  Avoid high intake of Tylenol (more than 4 extra-strength pills in one day)  -  Call us if any bleeding, fevers, confusion, disorientation occur  -  Endoscopy: past EGD at [facility]. - follow up with same doctor.  -  Transplant option discussed, will evaluate if liver shows no improvement, labs today.   -  Return in 3 months.

## 2024-02-14 NOTE — TELEPHONE ENCOUNTER
Patient labs and US were scheduled by Maria L and Altagracia. Spoke with patient to schedule follow up with Dr. Min in 3 months. Patient scheduled and verbalized understanding. Patient also states a date for the labs was scheduled for her but was not aware and need to cancel and reschedule. Inform patient to send a message in portal with date needing to be changed so we can work on rescheduling. Patient verbalized understanding.

## 2024-02-19 ENCOUNTER — LAB VISIT (OUTPATIENT)
Dept: LAB | Facility: HOSPITAL | Age: 39
End: 2024-02-19
Attending: INTERNAL MEDICINE
Payer: MEDICAID

## 2024-02-19 DIAGNOSIS — K70.31 ASCITES DUE TO ALCOHOLIC CIRRHOSIS: Primary | Chronic | ICD-10-CM

## 2024-02-19 DIAGNOSIS — K70.9 ALCOHOLIC LIVER DISEASE: ICD-10-CM

## 2024-02-19 LAB
ALBUMIN SERPL BCP-MCNC: 2.7 G/DL (ref 3.5–5.2)
ALP SERPL-CCNC: 68 U/L (ref 55–135)
ALT SERPL W/O P-5'-P-CCNC: 16 U/L (ref 10–44)
ANION GAP SERPL CALC-SCNC: 7 MMOL/L (ref 8–16)
AST SERPL-CCNC: 47 U/L (ref 10–40)
BILIRUB SERPL-MCNC: 2.5 MG/DL (ref 0.1–1)
BUN SERPL-MCNC: 10 MG/DL (ref 6–20)
CALCIUM SERPL-MCNC: 8.8 MG/DL (ref 8.7–10.5)
CHLORIDE SERPL-SCNC: 102 MMOL/L (ref 95–110)
CO2 SERPL-SCNC: 26 MMOL/L (ref 23–29)
CREAT SERPL-MCNC: 0.8 MG/DL (ref 0.5–1.4)
EST. GFR  (NO RACE VARIABLE): >60 ML/MIN/1.73 M^2
GLUCOSE SERPL-MCNC: 121 MG/DL (ref 70–110)
POTASSIUM SERPL-SCNC: 3.5 MMOL/L (ref 3.5–5.1)
PROT SERPL-MCNC: 7.7 G/DL (ref 6–8.4)
SODIUM SERPL-SCNC: 135 MMOL/L (ref 136–145)

## 2024-02-19 PROCEDURE — 80053 COMPREHEN METABOLIC PANEL: CPT | Performed by: INTERNAL MEDICINE

## 2024-02-19 PROCEDURE — 36415 COLL VENOUS BLD VENIPUNCTURE: CPT | Mod: PO | Performed by: INTERNAL MEDICINE

## 2024-02-19 NOTE — TELEPHONE ENCOUNTER
Call returned to the patient at [phone]. No Answer. Left VM message to return the call to the [facility] the Office of Dr Min at [phone] regarding your questions. This is Claudia.    Spoke with Dr Min earlier about the message.  Get repeat CMP and schedule an appt for a Paracentesis.      Patient returned the call to the clinic.  Patient stated abd needs to be drained. Looks like I am pregnant.    Patient informed I will arrange an appt for a Paracentesis for you. Where do you go?  I usually come there.  Pt informed Para's are an Out Pt procedure.  This is not usually done in the ER.    I stated I live near . I will call and arrange for an appt at [facility] for a Para.    Also I have been having leg cramps.  Will you be able to get labs done today or sometime this week? Pt stated I can go and get them done today.    Labs scheduled at [facility] for today 2/19/24 at 4:30 pm.    Patient informed that she will receive a call from the Radiology Nurse in  for your appt. Please answer the phone. It will be a 225 area code number.    Patient voiced understanding.

## 2024-02-27 ENCOUNTER — HOSPITAL ENCOUNTER (OUTPATIENT)
Dept: RADIOLOGY | Facility: HOSPITAL | Age: 39
Discharge: HOME | End: 2024-02-27
Attending: INTERNAL MEDICINE
Payer: MEDICAID

## 2024-02-27 VITALS
DIASTOLIC BLOOD PRESSURE: 76 MMHG | HEART RATE: 75 BPM | WEIGHT: 212 LBS | HEIGHT: 70 IN | RESPIRATION RATE: 16 BRPM | BODY MASS INDEX: 30.35 KG/M2 | SYSTOLIC BLOOD PRESSURE: 122 MMHG | OXYGEN SATURATION: 100 %

## 2024-02-27 DIAGNOSIS — K70.31 ASCITES DUE TO ALCOHOLIC CIRRHOSIS: Chronic | ICD-10-CM

## 2024-02-27 LAB
APPEARANCE FLD: NORMAL
BODY FLD TYPE: NORMAL
COLOR FLD: YELLOW
LYMPHOCYTES NFR FLD MANUAL: 53 %
MESOTHL CELL NFR FLD MANUAL: 6 %
MONOS+MACROS NFR FLD MANUAL: 40 %
NEUTROPHILS NFR FLD MANUAL: 1 %
WBC # FLD: 321 /CU MM

## 2024-02-27 PROCEDURE — 89051 BODY FLUID CELL COUNT: CPT | Performed by: INTERNAL MEDICINE

## 2024-02-27 PROCEDURE — 49083 ABD PARACENTESIS W/IMAGING: CPT | Mod: ,,, | Performed by: RADIOLOGY

## 2024-02-27 PROCEDURE — 49083 ABD PARACENTESIS W/IMAGING: CPT

## 2024-02-27 NOTE — DISCHARGE INSTRUCTIONS
Please return to ER if any of these symptoms occur:  Fever over 101 degrees,  Any purulent drainage from site (pus, yellow or has foul odor), or any redness or swelling to site  Bleeding from the puncture site not controlled, If bleeding occurs at site hold pressure for 5 mins.  If bleeding continues go to ER  Pain not controlled with Aleve or Tylenol,     Do not submerge in standing water for 24 hours after biopsy but you may shower.     May change bandage if it becomes soiled and bandage may be removed in 24 hours.     Resume home medications and diet     Please follow up with Dr Min for results and any other questions or concerns that you may have.

## 2024-02-27 NOTE — PLAN OF CARE
4.5L of dark huey drainage removed from abdomen. Patient AAOx4, denies any pain or discomfort related to paracentesis. Catheter removed from left abdominal wall without difficulty, tip intact. Bandage applied, CDI. Vital signs WNL

## 2024-02-27 NOTE — DISCHARGE SUMMARY
[facility] (Hospital)  Discharge Note  Short Stay    US Paracentesis inc Imaging      OUTCOME: Patient tolerated treatment/procedure well without complication and is now ready for discharge.    DISPOSITION: Home or Self Care    FINAL DIAGNOSIS:  <principal problem not specified>    FOLLOWUP: In clinic    DISCHARGE INSTRUCTIONS:  No discharge procedures on file.      Clinical Reference Documents Added to Patient Instructions         Document    ABDOMINAL PARACENTESIS DISCHARGE INSTRUCTIONS (ENGLISH)            TIME SPENT ON DISCHARGE: 15 minutes    Pre Op Diagnosis: ascites     Post Op Diagnosis: same    Procedure:  para     Procedure performed by: Brianna AGARWAL, Barb MEDEIROS     Written Informed Consent Obtained: Yes     Specimen Removed:  yes     Estimated Blood Loss:  minimal     Findings: Local anesthesia and moderate sedation were used.     The patient tolerated the procedure well and there were no complications.      Disposition:  F/U in clinic    Discharge instructions:  Light activity for 24 hours.  Remove band aid in 24 hours.  No baths (showers are appropriate).    F/U with ordering physician    Sterile technique was performed in the lower abdomen, lidocaine was used as a local anesthetic.   4.5 liters of huey fluid removed for therapeutic purposes.  Pt tolerated the procedure well without immediate complications.  Please see radiologist report for details. F/u with PCP and/or ordering physician.

## 2024-02-28 ENCOUNTER — TELEPHONE (OUTPATIENT)
Dept: HEPATOLOGY | Facility: CLINIC | Age: 39
End: 2024-02-28
Payer: MEDICAID

## 2024-02-28 NOTE — TELEPHONE ENCOUNTER
----- Message from Magalis Min MD sent at 2/27/2024  4:52 PM CST -----  Please inform patient results are OK.

## 2024-04-29 ENCOUNTER — LAB VISIT (OUTPATIENT)
Dept: LAB | Facility: HOSPITAL | Age: 39
End: 2024-04-29
Attending: INTERNAL MEDICINE
Payer: MEDICAID

## 2024-04-29 DIAGNOSIS — R60.0 EDEMA OF BOTH LEGS: ICD-10-CM

## 2024-04-29 DIAGNOSIS — K70.9 ALCOHOLIC LIVER DISEASE: ICD-10-CM

## 2024-04-29 DIAGNOSIS — K70.31 ASCITES DUE TO ALCOHOLIC CIRRHOSIS: Chronic | ICD-10-CM

## 2024-04-29 LAB
AFP SERPL-MCNC: 3 NG/ML (ref 0–8.4)
ALBUMIN SERPL BCP-MCNC: 3.1 G/DL (ref 3.5–5.2)
ALP SERPL-CCNC: 64 U/L (ref 55–135)
ALT SERPL W/O P-5'-P-CCNC: 19 U/L (ref 10–44)
ANION GAP SERPL CALC-SCNC: 8 MMOL/L (ref 8–16)
AST SERPL-CCNC: 41 U/L (ref 10–40)
BASOPHILS # BLD AUTO: 0.03 K/UL (ref 0–0.2)
BASOPHILS NFR BLD: 1.1 % (ref 0–1.9)
BILIRUB SERPL-MCNC: 1.5 MG/DL (ref 0.1–1)
BUN SERPL-MCNC: 20 MG/DL (ref 6–20)
CALCIUM SERPL-MCNC: 9 MG/DL (ref 8.7–10.5)
CHLORIDE SERPL-SCNC: 105 MMOL/L (ref 95–110)
CO2 SERPL-SCNC: 23 MMOL/L (ref 23–29)
CREAT SERPL-MCNC: 0.9 MG/DL (ref 0.5–1.4)
DIFFERENTIAL METHOD BLD: ABNORMAL
EOSINOPHIL # BLD AUTO: 0.1 K/UL (ref 0–0.5)
EOSINOPHIL NFR BLD: 2.3 % (ref 0–8)
ERYTHROCYTE [DISTWIDTH] IN BLOOD BY AUTOMATED COUNT: 19.2 % (ref 11.5–14.5)
EST. GFR  (NO RACE VARIABLE): >60 ML/MIN/1.73 M^2
GLUCOSE SERPL-MCNC: 108 MG/DL (ref 70–110)
HCT VFR BLD AUTO: 31.5 % (ref 37–48.5)
HGB BLD-MCNC: 9.3 G/DL (ref 12–16)
IMM GRANULOCYTES # BLD AUTO: 0.01 K/UL (ref 0–0.04)
IMM GRANULOCYTES NFR BLD AUTO: 0.4 % (ref 0–0.5)
INR PPP: 1.3 (ref 0.8–1.2)
LYMPHOCYTES # BLD AUTO: 0.8 K/UL (ref 1–4.8)
LYMPHOCYTES NFR BLD: 29.4 % (ref 18–48)
MCH RBC QN AUTO: 23.4 PG (ref 27–31)
MCHC RBC AUTO-ENTMCNC: 29.5 G/DL (ref 32–36)
MCV RBC AUTO: 79 FL (ref 82–98)
MONOCYTES # BLD AUTO: 0.3 K/UL (ref 0.3–1)
MONOCYTES NFR BLD: 12.6 % (ref 4–15)
NEUTROPHILS # BLD AUTO: 1.4 K/UL (ref 1.8–7.7)
NEUTROPHILS NFR BLD: 54.2 % (ref 38–73)
NRBC BLD-RTO: 0 /100 WBC
PLATELET # BLD AUTO: 71 K/UL (ref 150–450)
PMV BLD AUTO: ABNORMAL FL (ref 9.2–12.9)
POTASSIUM SERPL-SCNC: 3.9 MMOL/L (ref 3.5–5.1)
PROT SERPL-MCNC: 7.8 G/DL (ref 6–8.4)
PROTHROMBIN TIME: 14.4 SEC (ref 9–12.5)
RBC # BLD AUTO: 3.97 M/UL (ref 4–5.4)
SODIUM SERPL-SCNC: 136 MMOL/L (ref 136–145)
WBC # BLD AUTO: 2.62 K/UL (ref 3.9–12.7)

## 2024-04-29 PROCEDURE — 82105 ALPHA-FETOPROTEIN SERUM: CPT | Performed by: INTERNAL MEDICINE

## 2024-04-29 PROCEDURE — 80053 COMPREHEN METABOLIC PANEL: CPT | Performed by: INTERNAL MEDICINE

## 2024-04-29 PROCEDURE — 85610 PROTHROMBIN TIME: CPT | Performed by: INTERNAL MEDICINE

## 2024-04-29 PROCEDURE — 85025 COMPLETE CBC W/AUTO DIFF WBC: CPT | Performed by: INTERNAL MEDICINE

## 2024-04-29 PROCEDURE — 36415 COLL VENOUS BLD VENIPUNCTURE: CPT | Mod: PO | Performed by: INTERNAL MEDICINE

## 2024-05-01 RX ORDER — SPIRONOLACTONE 100 MG/1
100 TABLET, FILM COATED ORAL DAILY
Qty: 90 TABLET | Refills: 3 | Status: SHIPPED | OUTPATIENT
Start: 2024-05-01 | End: 2025-05-01

## 2024-05-14 ENCOUNTER — OFFICE VISIT (OUTPATIENT)
Dept: HEPATOLOGY | Facility: CLINIC | Age: 39
End: 2024-05-14
Payer: MEDICAID

## 2024-05-14 VITALS
RESPIRATION RATE: 18 BRPM | HEIGHT: 70 IN | BODY MASS INDEX: 27.15 KG/M2 | TEMPERATURE: 98 F | DIASTOLIC BLOOD PRESSURE: 68 MMHG | OXYGEN SATURATION: 98 % | WEIGHT: 189.63 LBS | SYSTOLIC BLOOD PRESSURE: 121 MMHG | HEART RATE: 93 BPM

## 2024-05-14 DIAGNOSIS — K70.31 ALCOHOLIC CIRRHOSIS OF LIVER WITH ASCITES: Primary | ICD-10-CM

## 2024-05-14 PROCEDURE — 99999 PR PBB SHADOW E&M-EST. PATIENT-LVL I: CPT | Mod: PBBFAC,,, | Performed by: INTERNAL MEDICINE

## 2024-05-14 PROCEDURE — 99214 OFFICE O/P EST MOD 30 MIN: CPT | Mod: S$PBB,,, | Performed by: INTERNAL MEDICINE

## 2024-05-14 PROCEDURE — 99211 OFF/OP EST MAY X REQ PHY/QHP: CPT | Mod: PBBFAC | Performed by: INTERNAL MEDICINE

## 2024-05-14 NOTE — PATIENT INSTRUCTIONS
Plan:  -  No More Alcohol, ever ever.  Patient agrees.   -  Labs every 3 months:  CBC, CMP, PT INR, next due July 2024.   -  Ultrasound of abdomen and AFP every 6 months, next due end of July 2024   -  Paracentesis standing order placed, with cell count and diff.   -  Low salt in the diet, avoid canned, bottled and processed foods.  -  Continue current meds  -  Avoid alcohol, smoking, sedatives and meds with codeine.  -  Avoid high intake of Tylenol (more than 4 extra-strength pills in one day)  -  Call us if any bleeding, fevers, confusion, disorientation occur  -  Endoscopy: past EGD at [facility]. - follow up with same doctor.  -  Transplant option discussed, will evaluate if liver shows no improvement.  -  Return in 3 months.

## 2024-05-14 NOTE — PROGRESS NOTES
HEPATOLOGY FOLLOW UP    Litzy Amato is here for follow up of No chief complaint on file.      HPI  39 y.o. female with No chief complaint on file.  Cirrhosis, ascites, muscle cramps.    Patient admitted 9/25/23 and discharged 10/2/23, here for post-hospital discharge  follow-up. She was told she has cirrhosis.      Has a history of Alcohol-related liver disease, gastric sleeve surgery, one episode of SBP, one paracentesis (6 L) with albumin infusion, 2 uPRBCs for anemia, eso varices, nonbleeding.  She was discharged but redeveloped abd distention, had some hematochezia, and intermittent melena, was noted to be hypotensive, needing readmission on 9/25/23.  Para repeated (3 L), no SBP found this time. Hgb 6.6, was given I U PRBCs, CT A/P findings of cirrhosis with portal hypertension, anasarca.    Today 5/14/24:    -  Stopped alcohol on 9/2/2023.  It is now approx 8 months of abstinence.  Eats healthy, and not drinking.      -  C/o muscle cramps.  Not sleeping well.  Has 4-5 hours of sleep.   Had 5 paracenteses, initial two were at [facility] [place], last one was 4.5 : removed on 2/27/24 at [facility].       -  She has seen a psychiatrist, but hasn't seen one, she is paying out of her pocket, because she has not received call back from the places she hs tried to get an appointment.  She has medicaid.      Labs: pancytopenia, INR 1.3, albumin improved.    MELD 3.0: 13 at 4/29/2024 10:46 AM  MELD-Na: 11 at 4/29/2024 10:46 AM  Calculated from:  Serum Creatinine: 0.9 mg/dL (Using min of 1 mg/dL) at 4/29/2024 10:46 AM  Serum Sodium: 136 mmol/L at 4/29/2024 10:46 AM  Total Bilirubin: 1.5 mg/dL at 4/29/2024 10:46 AM  Serum Albumin: 3.1 g/dL at 4/29/2024 10:46 AM  INR(ratio): 1.3 at 4/29/2024 10:46 AM  Age at listing (hypothetical): 38 years  Sex: Female at 4/29/2024 10:46 AM      I have told her of the possibility of a living donor.      -  Needs HCC surveillance, next due end of July 31, 2024.           12/19/24:  Lost a lot of wt, 73 lb in 3 months.  Edema in the lower extremities is gone.  She feels abd is still distended, but exam of abdomen reveals thick and wall.      Has not touched alcohol since 9/2/2024.  Prior to that she had quit drinking but after her 's death on 6/5/23, from a heart attack, aneurysm, she started drinking again.  Has no children.   Has a pretty big family, but lost 5 family members in 6 months (April to Sept 2024).  3 of the family members had CA (pancreatic, liver, grandPa CA unknown).    Discharge Meds include diuretics lasix 40 and spironolactone 100 daily, lactulose, pantoprazole, miralax,.            Outpatient Encounter Medications as of 5/14/2024   Medication Sig Dispense Refill    docusate sodium (COLACE ORAL) Take 100 mg by mouth.      ELDERBERRY FRUIT ORAL Take by mouth.      furosemide (LASIX) 40 MG tablet Take 1 tablet (40 mg total) by mouth once daily. 90 tablet 3    iron, carbonyl (FEOSOL) 45 mg Tab Take 325 mg by mouth.      lactulose (CHRONULAC) 10 gram/15 mL solution Take 23 mLs (15 g total) by mouth 3 (three) times daily. 300 mL 0    nitrofurantoin, macrocrystal-monohydrate, (MACROBID) 100 MG capsule Take 100 mg by mouth.      omega-3 fatty acids/fish oil (FISH OIL-OMEGA-3 FATTY ACIDS) 300-1,000 mg capsule Take 500 mg by mouth.      pantoprazole (PROTONIX) 40 MG tablet Take 1 tablet (40 mg total) by mouth once daily. 30 tablet 0    PNV cmb#95-ferrous fumarate-FA (PRENATAL MULTIVITAMINS) 28 mg iron- 800 mcg Tab Take by mouth.      polyethylene glycol (GLYCOLAX) 17 gram/dose powder Use cap to measure out (17 g) mix with a liquid and take by mouth daily as needed (constipation). 510 g 0    spironolactone (ALDACTONE) 100 MG tablet Take 1 tablet (100 mg total) by mouth once daily. 90 tablet 3    [DISCONTINUED] spironolactone (ALDACTONE) 100 MG tablet Take 1 tablet (100 mg total) by mouth once daily. 90 tablet 3     No facility-administered encounter  medications on file as of 5/14/2024.     Review of patient's allergies indicates:  No Known Allergies  Past Medical History:   Diagnosis Date    Unspecified cirrhosis of liver      No past surgical history on file.  Social History     Socioeconomic History    Marital status:      Social Determinants of Health     Financial Resource Strain: Medium Risk (1/14/2024)    Overall Financial Resource Strain (CARDIA)     Difficulty of Paying Living Expenses: Somewhat hard   Food Insecurity: Food Insecurity Present (1/14/2024)    Hunger Vital Sign     Worried About Running Out of Food in the Last Year: Often true     Ran Out of Food in the Last Year: Often true   Transportation Needs: Unmet Transportation Needs (1/14/2024)    PRAPARE - Transportation     Lack of Transportation (Medical): Yes     Lack of Transportation (Non-Medical): Yes   Physical Activity: Inactive (1/14/2024)    Exercise Vital Sign     Days of Exercise per Week: 0 days     Minutes of Exercise per Session: 0 min   Stress: Stress Concern Present (1/14/2024)    [employer] - Occupational Stress Questionnaire     Feeling of Stress : Rather much   Housing Stability: High Risk (1/14/2024)    Housing Stability Vital Sign     Unable to Pay for Housing in the Last Year: Yes     Number of Places Lived in the Last Year: 3     Unstable Housing in the Last Year: Yes       ROS  Gen- no wt loss, gain, fever, chills  HEENT - no congestion, nosebleed, visual or hearing problems  Chest - no cough, shortness of breath, chest pain  Cardiovascular- no chest pain, leg swelling, dyspnea on exertion or at rest, orthopnea  GI- no abdominal pain, nausea, vomiting, constipation, or diarrhea   Musculoskeletal:  no pain or swelling of joints  Neuro - no headaches, dizziness, weakness, tingling numbness in hands or feet,  Skin- no rash, itching  Psych - no depression, anxiety, sleep disturbance, memory loss    There were no vitals filed for this  visit.      Physical Exam:  General Appearance: Well appearing in no acute distress  Head:   Normocephalic, without obvious abnormality  Eyes:    No scleral icterus, EOMI  ENT: Neck supple, Lips, mucosa, and tongue normal; teeth and gums normal  Lungs: CTA bilaterally in anterior and posterior fields, no wheezes, no crackles.  Heart:  Regular rate and rhythm, S1, S2 normal, no murmurs heard  Abdomen: Soft, non tender, non distended with positive bowel sounds in all four quadrants. No hepatosplenomegaly, ascites, or mass  Extremities: 2+ pulses, no clubbing, cyanosis or edema  Skin: No rash  Neurologic: CN II-XII intact, alert, oriented x 3, no asterixis.     Lab Results   Component Value Date     04/29/2024    BUN 20 04/29/2024    CREATININE 0.9 04/29/2024    CALCIUM 9.0 04/29/2024     04/29/2024    K 3.9 04/29/2024     04/29/2024    PROT 7.8 04/29/2024    CO2 23 04/29/2024    ANIONGAP 8 04/29/2024    WBC 2.62 (L) 04/29/2024    RBC 3.97 (L) 04/29/2024    HGB 9.3 (L) 04/29/2024    HCT 31.5 (L) 04/29/2024    MCV 79 (L) 04/29/2024    MCH 23.4 (L) 04/29/2024    MCHC 29.5 (L) 04/29/2024     Lab Results   Component Value Date    RDW 19.2 (H) 04/29/2024    PLT 71 (L) 04/29/2024    MPV SEE COMMENT 04/29/2024    GRAN 1.4 (L) 04/29/2024    GRAN 54.2 04/29/2024    LYMPH 0.8 (L) 04/29/2024    LYMPH 29.4 04/29/2024    MONO 0.3 04/29/2024    MONO 12.6 04/29/2024    EOSINOPHIL 2.3 04/29/2024    BASOPHIL 1.1 04/29/2024    EOS 0.1 04/29/2024    BASO 0.03 04/29/2024    APTT 29.6 09/05/2021    GROUPTRH O NEG 09/28/2023     (H) 09/25/2023    ALBUMIN 3.1 (L) 04/29/2024    BILIDIR 2.4 (H) 09/27/2023    AST 41 (H) 04/29/2024    ALT 19 04/29/2024    ALKPHOS 64 04/29/2024    MG 1.7 12/27/2023    LABPROT 14.4 (H) 04/29/2024    INR 1.3 (H) 04/29/2024       Diagnostics:     Assessment   No diagnosis found.    Alcoholic cirrhosis, ascites has improved, edema was gone, lost 73 lb in 3 months.    Abstinent since 9/2/23.    I have told her Never to drink alcohol again.   -  Eats healthy, and not drinking.    -  C/o muscle cramps.  Not sleeping well.  Has 4-5 hours of sleep.     -  Had 5 paracenteses, initial two were at [facility], last one was 4.5 : [place] [facility]     -  She has seen a psychiatrist, but in the last month, hasn't seen one, she is paying out of her pocket, because she has not received a call back from the places she has tried to get an appointment.  She has medicaid.      Labs: pancytopenia, INR 1.3, albumin improved.    MELD 3.0: 13 at 4/29/2024 10:46 AM  MELD-Na: 11 at 4/29/2024 10:46 AM  Calculated from:  Serum Creatinine: 0.9 mg/dL (Using min of 1 mg/dL) at 4/29/2024 10:46 AM  Serum Sodium: 136 mmol/L at 4/29/2024 10:46 AM  Total Bilirubin: 1.5 mg/dL at 4/29/2024 10:46 AM  Serum Albumin: 3.1 g/dL at 4/29/2024 10:46 AM  INR(ratio): 1.3 at 4/29/2024 10:46 AM  Age at listing (hypothetical): 38 years  Sex: Female at 4/29/2024 10:46 AM      I have told her of the possibility of a living donor.      -  Last u/s 1/31/2024:  No detrimental change with liver cirrhosis and portal venous hypertension sequela.  Small volume ascites.   Gallbladder sludge with borderline wall thickening in felt to be related to liver disease.  Needs future HCC surveillance, next due end of July 31, 2024.       Plan:  -  No More Alcohol, ever ever.  Patient agrees.   -  Labs every 3 months:  CBC, CMP, PT INR, next due July 2024.   -  Ultrasound of abdomen and AFP every 6 months, next due end of July 2024   -  Paracentesis standing order placed, with cell count and diff.   -  Low salt in the diet, avoid canned, bottled and processed foods.  -  Continue current meds  -  Avoid alcohol, smoking, sedatives and meds with codeine.  -  Avoid high intake of Tylenol (more than 4 extra-strength pills in one day)  -  Call us if any bleeding, fevers, confusion, disorientation occur  -  Endoscopy: past EGD at [facility]. -  follow up with same doctor.  -  Transplant option discussed, will evaluate if liver shows no improvement, labs today.   -  Return in 3 months.     Magalis Min MD  Hepatology, [facility]

## 2024-05-14 NOTE — Clinical Note
Plan: -  No More Alcohol, ever ever.  Patient agrees.  -  Labs every 3 months:  CBC, CMP, PT INR, next due July 2024.  -  Ultrasound of abdomen and AFP every 6 months, next due end of July 2024  -  Paracentesis standing order placed, with cell count and diff.  -  Low salt in the diet, avoid canned, bottled and processed foods. -  Continue current meds -  Avoid alcohol, smoking, sedatives and meds with codeine. -  Avoid high intake of Tylenol (more than 4 extra-strength pills in one day) -  Call us if any bleeding, fevers, confusion, disorientation occur -  Endoscopy: past EGD at [facility] - follow up with same doctor. -  Transplant option discussed, will evaluate if liver shows no improvement, labs today.  -  Return in 3 months.

## 2024-08-06 ENCOUNTER — LAB VISIT (OUTPATIENT)
Dept: LAB | Facility: HOSPITAL | Age: 39
End: 2024-08-06
Attending: INTERNAL MEDICINE
Payer: MEDICAID

## 2024-08-06 DIAGNOSIS — K70.31 ALCOHOLIC CIRRHOSIS OF LIVER WITH ASCITES: ICD-10-CM

## 2024-08-06 LAB
AFP SERPL-MCNC: 2.7 NG/ML (ref 0–8.4)
ALBUMIN SERPL BCP-MCNC: 3.2 G/DL (ref 3.5–5.2)
ALP SERPL-CCNC: 48 U/L (ref 55–135)
ALT SERPL W/O P-5'-P-CCNC: 17 U/L (ref 10–44)
ANION GAP SERPL CALC-SCNC: 6 MMOL/L (ref 8–16)
AST SERPL-CCNC: 32 U/L (ref 10–40)
BASOPHILS # BLD AUTO: 0.01 K/UL (ref 0–0.2)
BASOPHILS NFR BLD: 0.5 % (ref 0–1.9)
BILIRUB SERPL-MCNC: 0.6 MG/DL (ref 0.1–1)
BUN SERPL-MCNC: 13 MG/DL (ref 6–20)
CALCIUM SERPL-MCNC: 8.9 MG/DL (ref 8.7–10.5)
CHLORIDE SERPL-SCNC: 109 MMOL/L (ref 95–110)
CO2 SERPL-SCNC: 25 MMOL/L (ref 23–29)
CREAT SERPL-MCNC: 0.8 MG/DL (ref 0.5–1.4)
DIFFERENTIAL METHOD BLD: ABNORMAL
EOSINOPHIL # BLD AUTO: 0.1 K/UL (ref 0–0.5)
EOSINOPHIL NFR BLD: 2.9 % (ref 0–8)
ERYTHROCYTE [DISTWIDTH] IN BLOOD BY AUTOMATED COUNT: 21.1 % (ref 11.5–14.5)
EST. GFR  (NO RACE VARIABLE): >60 ML/MIN/1.73 M^2
GLUCOSE SERPL-MCNC: 127 MG/DL (ref 70–110)
HCT VFR BLD AUTO: 28.6 % (ref 37–48.5)
HGB BLD-MCNC: 8.5 G/DL (ref 12–16)
IMM GRANULOCYTES # BLD AUTO: 0.01 K/UL (ref 0–0.04)
IMM GRANULOCYTES NFR BLD AUTO: 0.5 % (ref 0–0.5)
INR PPP: 1.3 (ref 0.8–1.2)
LYMPHOCYTES # BLD AUTO: 0.7 K/UL (ref 1–4.8)
LYMPHOCYTES NFR BLD: 35.4 % (ref 18–48)
MCH RBC QN AUTO: 23.2 PG (ref 27–31)
MCHC RBC AUTO-ENTMCNC: 29.7 G/DL (ref 32–36)
MCV RBC AUTO: 78 FL (ref 82–98)
MONOCYTES # BLD AUTO: 0.3 K/UL (ref 0.3–1)
MONOCYTES NFR BLD: 12.1 % (ref 4–15)
NEUTROPHILS # BLD AUTO: 1 K/UL (ref 1.8–7.7)
NEUTROPHILS NFR BLD: 48.6 % (ref 38–73)
NRBC BLD-RTO: 0 /100 WBC
PLATELET # BLD AUTO: 55 K/UL (ref 150–450)
PLATELET BLD QL SMEAR: ABNORMAL
PMV BLD AUTO: ABNORMAL FL (ref 9.2–12.9)
POTASSIUM SERPL-SCNC: 3.9 MMOL/L (ref 3.5–5.1)
PROT SERPL-MCNC: 7 G/DL (ref 6–8.4)
PROTHROMBIN TIME: 14.5 SEC (ref 9–12.5)
RBC # BLD AUTO: 3.67 M/UL (ref 4–5.4)
SODIUM SERPL-SCNC: 140 MMOL/L (ref 136–145)
WBC # BLD AUTO: 2.06 K/UL (ref 3.9–12.7)

## 2024-08-06 PROCEDURE — 85025 COMPLETE CBC W/AUTO DIFF WBC: CPT | Performed by: INTERNAL MEDICINE

## 2024-08-06 PROCEDURE — 80053 COMPREHEN METABOLIC PANEL: CPT | Performed by: INTERNAL MEDICINE

## 2024-08-06 PROCEDURE — 85610 PROTHROMBIN TIME: CPT | Performed by: INTERNAL MEDICINE

## 2024-08-06 PROCEDURE — 82105 ALPHA-FETOPROTEIN SERUM: CPT | Performed by: INTERNAL MEDICINE

## 2024-08-06 PROCEDURE — 36415 COLL VENOUS BLD VENIPUNCTURE: CPT | Mod: PO | Performed by: INTERNAL MEDICINE

## 2024-08-09 ENCOUNTER — TELEPHONE (OUTPATIENT)
Dept: TRANSPLANT | Facility: CLINIC | Age: 39
End: 2024-08-09
Payer: MEDICAID

## 2024-08-09 ENCOUNTER — TELEPHONE (OUTPATIENT)
Dept: HEPATOLOGY | Facility: CLINIC | Age: 39
End: 2024-08-09
Payer: MEDICAID

## 2024-08-09 DIAGNOSIS — D50.8 OTHER IRON DEFICIENCY ANEMIA: Primary | ICD-10-CM

## 2024-08-12 ENCOUNTER — TELEPHONE (OUTPATIENT)
Dept: TRANSPLANT | Facility: CLINIC | Age: 39
End: 2024-08-12
Payer: MEDICAID

## 2024-08-12 ENCOUNTER — TELEPHONE (OUTPATIENT)
Dept: HEPATOLOGY | Facility: CLINIC | Age: 39
End: 2024-08-12
Payer: MEDICAID

## 2024-08-12 DIAGNOSIS — D50.8 OTHER IRON DEFICIENCY ANEMIA: Primary | ICD-10-CM

## 2024-08-12 RX ORDER — FERROUS GLUCONATE 324(38)MG
324 TABLET ORAL 2 TIMES DAILY WITH MEALS
Qty: 60 TABLET | Refills: 2 | Status: SHIPPED | OUTPATIENT
Start: 2024-08-12

## 2024-08-12 NOTE — TELEPHONE ENCOUNTER
Asvised pt anemia worsening. Sending script for iron tablets. Take one tab twice daily. Get a CBC in one month. Other labs stable, consistent with cirrhosis per Dr. Min. Pt verbalized understanding. LUCERO SANDERS

## 2024-08-12 NOTE — TELEPHONE ENCOUNTER
Called patient to notify that Dr. Min has sent a script to pharmacy for iron. Take one tablet by mouth twice daily with meals.   Patient verbalized understanding

## 2024-08-13 DIAGNOSIS — R60.0 EDEMA OF BOTH LEGS: ICD-10-CM

## 2024-08-13 RX ORDER — FUROSEMIDE 40 MG/1
40 TABLET ORAL
Qty: 90 TABLET | Refills: 3 | Status: SHIPPED | OUTPATIENT
Start: 2024-08-13

## 2024-08-20 ENCOUNTER — OFFICE VISIT (OUTPATIENT)
Dept: HEPATOLOGY | Facility: CLINIC | Age: 39
End: 2024-08-20
Payer: MEDICAID

## 2024-08-20 VITALS
HEIGHT: 70 IN | OXYGEN SATURATION: 99 % | HEART RATE: 65 BPM | DIASTOLIC BLOOD PRESSURE: 68 MMHG | SYSTOLIC BLOOD PRESSURE: 118 MMHG | WEIGHT: 206.81 LBS | BODY MASS INDEX: 29.61 KG/M2 | TEMPERATURE: 98 F

## 2024-08-20 DIAGNOSIS — K70.30 ALCOHOLIC CIRRHOSIS OF LIVER WITHOUT ASCITES: Primary | ICD-10-CM

## 2024-08-20 PROCEDURE — 3078F DIAST BP <80 MM HG: CPT | Mod: CPTII,,, | Performed by: INTERNAL MEDICINE

## 2024-08-20 PROCEDURE — 3008F BODY MASS INDEX DOCD: CPT | Mod: CPTII,,, | Performed by: INTERNAL MEDICINE

## 2024-08-20 PROCEDURE — 1159F MED LIST DOCD IN RCRD: CPT | Mod: CPTII,,, | Performed by: INTERNAL MEDICINE

## 2024-08-20 PROCEDURE — 99214 OFFICE O/P EST MOD 30 MIN: CPT | Mod: S$PBB,,, | Performed by: INTERNAL MEDICINE

## 2024-08-20 PROCEDURE — 99213 OFFICE O/P EST LOW 20 MIN: CPT | Mod: PBBFAC | Performed by: INTERNAL MEDICINE

## 2024-08-20 PROCEDURE — 99999 PR PBB SHADOW E&M-EST. PATIENT-LVL III: CPT | Mod: PBBFAC,,, | Performed by: INTERNAL MEDICINE

## 2024-08-20 PROCEDURE — 3074F SYST BP LT 130 MM HG: CPT | Mod: CPTII,,, | Performed by: INTERNAL MEDICINE

## 2024-08-20 NOTE — PROGRESS NOTES
HEPATOLOGY FOLLOW UP    Litzy Amato is here for follow up of No chief complaint on file.      HPI  39 y.o. female with No chief complaint on file.  Cirrhosis, ascites, muscle cramps.    Patient admitted 9/25/23 and discharged 10/2/23, here for post-hospital discharge  follow-up. She was told she has cirrhosis.      Has a history of Alcohol-related liver disease, gastric sleeve surgery, one episode of SBP, one paracentesis (6 L) with albumin infusion, 2 uPRBCs for anemia, eso varices, nonbleeding.  She was discharged but redeveloped abd distention, had some hematochezia, and intermittent melena, was noted to be hypotensive, needing readmission on 9/25/23.  Para repeated (3 L), no SBP found this time. Hgb 6.6, was given I U PRBCs, CT A/P findings of cirrhosis with portal hypertension, anasarca.    Today 8/20/24:  Patient states she is doing well.    Microcytic, hypochromic Anemia, will give iron supplement to see if she responds.      MELD 3.0: 10 at 8/6/2024 10:28 AM  MELD-Na: 9 at 8/6/2024 10:28 AM  Calculated from:  Serum Creatinine: 0.8 mg/dL (Using min of 1 mg/dL) at 8/6/2024 10:28 AM  Serum Sodium: 140 mmol/L (Using max of 137 mmol/L) at 8/6/2024 10:28 AM  Total Bilirubin: 0.6 mg/dL (Using min of 1 mg/dL) at 8/6/2024 10:28 AM  Serum Albumin: 3.2 g/dL at 8/6/2024 10:28 AM  INR(ratio): 1.3 at 8/6/2024 10:28 AM  Age at listing (hypothetical): 39 years  Sex: Female at 8/6/2024 10:28 AM        HCC surveillance:   Last U/s showed no focal lesion in the liver.  AFP 2.7.     Will continue surveillance every 6 months, next in Feb 2025.      5/14/24:    -  Stopped alcohol on 9/2/2023.  It is now approx 8 months of abstinence.  Eats healthy, and not drinking.      -  C/o muscle cramps.  Not sleeping well.  Has 4-5 hours of sleep.   Had 5 paracenteses, initial two were at [facility], last one was 4.5 : removed on 2/27/24 at [facility].       -  She has seen a psychiatrist, but hasn't seen one, she is paying  out of her pocket, because she has not received call back from the places she hs tried to get an appointment.  She has medicaid.      Labs: pancytopenia, INR 1.3, albumin improved.    MELD 3.0: 10 at 8/6/2024 10:28 AM  MELD-Na: 9 at 8/6/2024 10:28 AM  Calculated from:  Serum Creatinine: 0.8 mg/dL (Using min of 1 mg/dL) at 8/6/2024 10:28 AM  Serum Sodium: 140 mmol/L (Using max of 137 mmol/L) at 8/6/2024 10:28 AM  Total Bilirubin: 0.6 mg/dL (Using min of 1 mg/dL) at 8/6/2024 10:28 AM  Serum Albumin: 3.2 g/dL at 8/6/2024 10:28 AM  INR(ratio): 1.3 at 8/6/2024 10:28 AM  Age at listing (hypothetical): 39 years  Sex: Female at 8/6/2024 10:28 AM      I have told her of the possibility of a living donor.        12/19/24:  Lost a lot of wt, 73 lb in 3 months.  Edema in the lower extremities is gone.  She feels abd is still distended, but exam of abdomen reveals thick and wall.      Has not touched alcohol since 9/2/2024.  Prior to that she had quit drinking but after her 's death on 6/5/23, from a heart attack, aneurysm, she started drinking again.  Has no children.   Has a pretty big family, but lost 5 family members in 6 months (April to Sept 2024).  3 of the family members had CA (pancreatic, liver, grandPa CA unknown).    Discharge Meds include diuretics lasix 40 and spironolactone 100 daily, lactulose, pantoprazole, miralax,.            Outpatient Encounter Medications as of 8/20/2024   Medication Sig Dispense Refill    docusate sodium (COLACE ORAL) Take 100 mg by mouth.      ELDERBERRY FRUIT ORAL Take by mouth.      ferrous gluconate (FERGON) 324 MG tablet Take 1 tablet (324 mg total) by mouth 2 (two) times daily with meals. 60 tablet 2    furosemide (LASIX) 40 MG tablet TAKE 1 TABLET BY MOUTH ONCE DAILY. 90 tablet 3    lactulose (CHRONULAC) 10 gram/15 mL solution Take 23 mLs (15 g total) by mouth 3 (three) times daily. 300 mL 0    omega-3 fatty acids/fish oil (FISH OIL-OMEGA-3 FATTY ACIDS) 300-1,000 mg capsule  Take 500 mg by mouth.      pantoprazole (PROTONIX) 40 MG tablet Take 1 tablet (40 mg total) by mouth once daily. 30 tablet 0    PNV cmb#95-ferrous fumarate-FA (PRENATAL MULTIVITAMINS) 28 mg iron- 800 mcg Tab Take by mouth.      polyethylene glycol (GLYCOLAX) 17 gram/dose powder Use cap to measure out (17 g) mix with a liquid and take by mouth daily as needed (constipation). 510 g 0    spironolactone (ALDACTONE) 100 MG tablet Take 1 tablet (100 mg total) by mouth once daily. 90 tablet 3    [DISCONTINUED] nitrofurantoin, macrocrystal-monohydrate, (MACROBID) 100 MG capsule Take 100 mg by mouth.      [DISCONTINUED] furosemide (LASIX) 40 MG tablet Take 1 tablet (40 mg total) by mouth once daily. 90 tablet 3    [DISCONTINUED] iron, carbonyl (FEOSOL) 45 mg Tab Take 325 mg by mouth.       No facility-administered encounter medications on file as of 8/20/2024.     Review of patient's allergies indicates:  No Known Allergies  Past Medical History:   Diagnosis Date    Unspecified cirrhosis of liver      No past surgical history on file.  Social History     Socioeconomic History    Marital status:      Social Determinants of Health     Financial Resource Strain: Medium Risk (1/14/2024)    Overall Financial Resource Strain (CARDIA)     Difficulty of Paying Living Expenses: Somewhat hard   Food Insecurity: Food Insecurity Present (1/14/2024)    Hunger Vital Sign     Worried About Running Out of Food in the Last Year: Often true     Ran Out of Food in the Last Year: Often true   Transportation Needs: Unmet Transportation Needs (1/14/2024)    PRAPARE - Transportation     Lack of Transportation (Medical): Yes     Lack of Transportation (Non-Medical): Yes   Physical Activity: Inactive (1/14/2024)    Exercise Vital Sign     Days of Exercise per Week: 0 days     Minutes of Exercise per Session: 0 min   Stress: Stress Concern Present (1/14/2024)    [employer] [place] [employer] - Occupational Stress Questionnaire      Feeling of Stress : Rather much   Housing Stability: High Risk (1/14/2024)    Housing Stability Vital Sign     Unable to Pay for Housing in the Last Year: Yes     Number of Places Lived in the Last Year: 3     Unstable Housing in the Last Year: Yes       ROS  Gen- no wt loss, gain, fever, chills  HEENT - no congestion, nosebleed, visual or hearing problems  Chest - no cough, shortness of breath, chest pain  Cardiovascular- no chest pain, leg swelling, dyspnea on exertion or at rest, orthopnea  GI- no abdominal pain, nausea, vomiting, constipation, or diarrhea   Musculoskeletal:  no pain or swelling of joints  Neuro - no headaches, dizziness, weakness, tingling numbness in hands or feet,  Skin- no rash, itching  Psych - no depression, anxiety, sleep disturbance, memory loss    Vitals:    08/20/24 1440   BP: 118/68   Pulse: 65   Temp: 98.4 °F (36.9 °C)         Physical Exam:  General Appearance: Well appearing in no acute distress  Head:   Normocephalic, without obvious abnormality  Eyes:    No scleral icterus, EOMI  ENT: Neck supple, Lips, mucosa, and tongue normal; teeth and gums normal  Lungs: CTA bilaterally in anterior and posterior fields, no wheezes, no crackles.  Heart:  Regular rate and rhythm, S1, S2 normal, no murmurs heard  Abdomen: Soft, non tender, non distended with positive bowel sounds in all four quadrants. No hepatosplenomegaly, ascites, or mass  Extremities: 2+ pulses, no clubbing, cyanosis or edema  Skin: No rash  Neurologic: CN II-XII intact, alert, oriented x 3, no asterixis.     Lab Results   Component Value Date     (H) 08/06/2024    BUN 13 08/06/2024    CREATININE 0.8 08/06/2024    CALCIUM 8.9 08/06/2024     08/06/2024    K 3.9 08/06/2024     08/06/2024    PROT 7.0 08/06/2024    CO2 25 08/06/2024    ANIONGAP 6 (L) 08/06/2024    WBC 2.06 (L) 08/06/2024    RBC 3.67 (L) 08/06/2024    HGB 8.5 (L) 08/06/2024    HCT 28.6 (L) 08/06/2024    MCV 78 (L) 08/06/2024    MCH 23.2 (L)  08/06/2024    MCHC 29.7 (L) 08/06/2024     Lab Results   Component Value Date    RDW 21.1 (H) 08/06/2024    PLT 55 (L) 08/06/2024    MPV SEE COMMENT 08/06/2024    GRAN 1.0 (L) 08/06/2024    GRAN 48.6 08/06/2024    LYMPH 0.7 (L) 08/06/2024    LYMPH 35.4 08/06/2024    MONO 0.3 08/06/2024    MONO 12.1 08/06/2024    EOSINOPHIL 2.9 08/06/2024    BASOPHIL 0.5 08/06/2024    EOS 0.1 08/06/2024    BASO 0.01 08/06/2024    APTT 29.6 09/05/2021    GROUPTRH O NEG 09/28/2023     (H) 09/25/2023    ALBUMIN 3.2 (L) 08/06/2024    BILIDIR 2.4 (H) 09/27/2023    AST 32 08/06/2024    ALT 17 08/06/2024    ALKPHOS 48 (L) 08/06/2024    MG 1.7 12/27/2023    LABPROT 14.5 (H) 08/06/2024    INR 1.3 (H) 08/06/2024       Diagnostics:     Assessment   No diagnosis found.    Alcoholic cirrhosis, ascites has improved, edema was gone, lost 73 lb in 3 months.    Abstinent since 9/2/23.   I have told her Never to drink alcohol again.   -  Eats healthy, and not drinking.    -  C/o muscle cramps.  Not sleeping well.  Has 4-5 hours of sleep.     -  Had 5 paracenteses, initial two were at [facility], last one was 4.5 : removed on 2/27/24 at [facility].     -  She has seen a psychiatrist, but in the last month, hasn't seen one, she is paying out of her pocket, because she has not received a call back from the places she has tried to get an appointment.  She has medicaid.      Labs: pancytopenia, INR 1.3, albumin improved.    MELD 3.0: 13 at 4/29/2024 10:46 AM  MELD-Na: 11 at 4/29/2024 10:46 AM  Calculated from:  Serum Creatinine: 0.9 mg/dL (Using min of 1 mg/dL) at 4/29/2024 10:46 AM  Serum Sodium: 136 mmol/L at 4/29/2024 10:46 AM  Total Bilirubin: 1.5 mg/dL at 4/29/2024 10:46 AM  Serum Albumin: 3.1 g/dL at 4/29/2024 10:46 AM  INR(ratio): 1.3 at 4/29/2024 10:46 AM  Age at listing (hypothetical): 38 years  Sex: Female at 4/29/2024 10:46 AM      I have told her of the possibility of a living donor.      -  Last u/s 1/31/2024:  No detrimental change  with liver cirrhosis and portal venous hypertension sequela.  Small volume ascites.   Gallbladder sludge with borderline wall thickening in felt to be related to liver disease.  Needs future HCC surveillance, next due end of July 31, 2024.       Plan:  -  No More Alcohol, ever ever.  Patient agrees.   -  Labs every 3 months:  CBC, CMP, PT INR, next due November 2024.   -  Ultrasound of abdomen and AFP every 6 months, next due end of November 2024   -  Paracentesis standing order placed, with cell count and diff.   -  Low salt in the diet, avoid canned, bottled and processed foods.  -  Continue current meds  -  Avoid alcohol, smoking, sedatives and meds with codeine.  -  Avoid high intake of Tylenol (more than 4 extra-strength pills in one day)  -  Call us if any bleeding, fevers, confusion, disorientation occur  -  Endoscopy: past EGD at [facility]. - follow up with same doctor.  -  Transplant option discussed, will evaluate if liver shows no improvement, labs today.   -  Return in 6 months.     Magalis Min MD  Hepatology, [facility]

## 2024-08-20 NOTE — PATIENT INSTRUCTIONS
Plan:  -  No More Alcohol, ever ever.  Patient agrees.   -  Labs every 3 months:  CBC, CMP, PT INR, next due November 2024.   -  Ultrasound of abdomen and AFP every 6 months, next due end of November 2024   -  Paracentesis standing order placed, with cell count and diff.   -  Low salt in the diet, avoid canned, bottled and processed foods.  -  Continue current meds  -  Avoid alcohol, smoking, sedatives and meds with codeine.  -  Avoid high intake of Tylenol (more than 4 extra-strength pills in one day)  -  Call us if any bleeding, fevers, confusion, disorientation occur  -  Endoscopy: past EGD at [facility]. - follow up with same doctor.  -  Transplant option discussed, will evaluate if liver shows no improvement, labs today.   -  Return in 6 months.

## 2024-08-20 NOTE — Clinical Note
Plan: -  No More Alcohol, ever ever.  Patient agrees.  -  Labs every 3 months:  CBC, CMP, PT INR, next due November 2024.  -  Ultrasound of abdomen and AFP every 6 months, next due end of November 2024  -  Paracentesis standing order placed, with cell count and diff.  -  Low salt in the diet, avoid canned, bottled and processed foods. -  Continue current meds -  Avoid alcohol, smoking, sedatives and meds with codeine. -  Avoid high intake of Tylenol (more than 4 extra-strength pills in one day) -  Call us if any bleeding, fevers, confusion, disorientation occur -  Endoscopy: past EGD at [facility]. - follow up with same doctor. -  Transplant option discussed, will evaluate if liver shows no improvement, labs today.  -  Return in 6 months.

## 2024-08-22 ENCOUNTER — TELEPHONE (OUTPATIENT)
Dept: HEPATOLOGY | Facility: CLINIC | Age: 39
End: 2024-08-22
Payer: MEDICAID

## 2024-08-22 NOTE — TELEPHONE ENCOUNTER
----- Message from Magalis Min MD sent at 8/20/2024  3:06 PM CDT -----  Plan:  -  No More Alcohol, ever ever.  Patient agrees.   -  Labs every 3 months:  CBC, CMP, PT INR, next due November 2024.   -  Ultrasound of abdomen and AFP every 6 months, next due end of November 2024   -  Paracentesis standing order placed, with cell count and diff.   -  Low salt in the diet, avoid canned, bottled and processed foods.  -  Continue current meds  -  Avoid alcohol, smoking, sedatives and meds with codeine.  -  Avoid high intake of Tylenol (more than 4 extra-strength pills in one day)  -  Call us if any bleeding, fevers, confusion, disorientation occur  -  Endoscopy: past EGD at [facility]. - follow up with same doctor.  -  Transplant option discussed, will evaluate if liver shows no improvement, labs today.   -  Return in 6 months.

## 2024-08-22 NOTE — TELEPHONE ENCOUNTER
-  Labs every 3 months:  CBC, CMP, PT INR, next due November 2024. Appt schd    -  Ultrasound of abdomen and AFP every 6 months, next due end of November 2024. Appt schd.     Recall placed. LUCERO SANDERS

## 2024-08-22 NOTE — TELEPHONE ENCOUNTER
----- Message from Magalis Min MD sent at 8/20/2024  3:06 PM CDT -----  Plan:  -  No More Alcohol, ever ever.  Patient agrees.   -  Labs every 3 months:  CBC, CMP, PT INR, next due November 2024.   -  Ultrasound of abdomen and AFP every 6 months, next due end of November 2024   -  Paracentesis standing order placed, with cell count and diff.   -  Low salt in the diet, avoid canned, bottled and processed foods.  -  Continue current meds  -  Avoid alcohol, smoking, sedatives and meds with codeine.  -  Avoid high intake of Tylenol (more than 4 extra-strength pills in one day)  -  Call us if any bleeding, fevers, confusion, disorientation occur  -  Endoscopy: past EGD at [facility]. - follow up with same doctor.  -  Transplant option discussed, will evaluate if liver shows no improvement, labs today.   -  Return in 6 months.

## 2024-09-03 ENCOUNTER — LAB VISIT (OUTPATIENT)
Dept: LAB | Facility: HOSPITAL | Age: 39
End: 2024-09-03
Attending: INTERNAL MEDICINE
Payer: MEDICAID

## 2024-09-03 DIAGNOSIS — K70.31 ALCOHOLIC CIRRHOSIS OF LIVER WITH ASCITES: ICD-10-CM

## 2024-09-03 LAB
ALBUMIN SERPL BCP-MCNC: 3.3 G/DL (ref 3.5–5.2)
ALP SERPL-CCNC: 52 U/L (ref 55–135)
ALT SERPL W/O P-5'-P-CCNC: 16 U/L (ref 10–44)
ANION GAP SERPL CALC-SCNC: 10 MMOL/L (ref 8–16)
AST SERPL-CCNC: 31 U/L (ref 10–40)
BILIRUB SERPL-MCNC: 0.9 MG/DL (ref 0.1–1)
BUN SERPL-MCNC: 11 MG/DL (ref 6–20)
CALCIUM SERPL-MCNC: 8.9 MG/DL (ref 8.7–10.5)
CHLORIDE SERPL-SCNC: 105 MMOL/L (ref 95–110)
CO2 SERPL-SCNC: 21 MMOL/L (ref 23–29)
CREAT SERPL-MCNC: 0.9 MG/DL (ref 0.5–1.4)
EST. GFR  (NO RACE VARIABLE): >60 ML/MIN/1.73 M^2
GLUCOSE SERPL-MCNC: 135 MG/DL (ref 70–110)
POTASSIUM SERPL-SCNC: 4.1 MMOL/L (ref 3.5–5.1)
PROT SERPL-MCNC: 7.3 G/DL (ref 6–8.4)
SODIUM SERPL-SCNC: 136 MMOL/L (ref 136–145)

## 2024-09-03 PROCEDURE — 36415 COLL VENOUS BLD VENIPUNCTURE: CPT | Mod: PO | Performed by: INTERNAL MEDICINE

## 2024-09-03 PROCEDURE — 80053 COMPREHEN METABOLIC PANEL: CPT | Performed by: INTERNAL MEDICINE

## 2024-09-13 ENCOUNTER — PATIENT MESSAGE (OUTPATIENT)
Dept: HEPATOLOGY | Facility: CLINIC | Age: 39
End: 2024-09-13
Payer: MEDICAID

## 2024-09-13 ENCOUNTER — TELEPHONE (OUTPATIENT)
Dept: HEPATOLOGY | Facility: CLINIC | Age: 39
End: 2024-09-13
Payer: MEDICAID

## 2024-09-13 NOTE — TELEPHONE ENCOUNTER
----- Message from Magalis Min MD sent at 9/13/2024  8:51 AM CDT -----  Please inform patient results are OK.

## 2024-10-30 NOTE — H&P
Inpatient Radiology Pre-procedure Note    History of Present Illness:  Litzy Amato is a 38 y.o. female who presents for US guided paracentesis.    Patient reports she is not sexually active and declines a pregnancy test prior to the procedure.     Admission H&P reviewed.  Past Medical History:   Diagnosis Date    Unspecified cirrhosis of liver      No past surgical history on file.    Review of Systems:   As documented in primary team H&P    Home Meds:   Prior to Admission medications    Medication Sig Start Date End Date Taking? Authorizing Provider   docusate sodium (COLACE ORAL) Take 100 mg by mouth.    Provider, Historical   ELDERBERRY FRUIT ORAL Take by mouth.    Provider, Historical   furosemide (LASIX) 40 MG tablet Take 1 tablet (40 mg total) by mouth once daily. 10/3/23 10/2/24  Mellissa Laurent MD   iron, carbonyl (FEOSOL) 45 mg Tab Take 325 mg by mouth.    Provider, Historical   lactulose (CHRONULAC) 10 gram/15 mL solution Take 23 mLs (15 g total) by mouth 3 (three) times daily. 10/2/23   Mellissa Laurent MD   nitrofurantoin, macrocrystal-monohydrate, (MACROBID) 100 MG capsule Take 100 mg by mouth.    Provider, Historical   omega-3 fatty acids/fish oil (FISH OIL-OMEGA-3 FATTY ACIDS) 300-1,000 mg capsule Take 500 mg by mouth.    Provider, Historical   pantoprazole (PROTONIX) 40 MG tablet Take 1 tablet (40 mg total) by mouth once daily. 9/6/21   Simón Costa MD   PNV cmb#95-ferrous fumarate-FA (PRENATAL MULTIVITAMINS) 28 mg iron- 800 mcg Tab Take by mouth.    Provider, Historical   polyethylene glycol (GLYCOLAX) 17 gram/dose powder Use cap to measure out (17 g) mix with a liquid and take by mouth daily as needed (constipation). 10/2/23   Mellissa Laurent MD   spironolactone (ALDACTONE) 100 MG tablet Take 1 tablet (100 mg total) by mouth once daily. 10/3/23 10/2/24  Mellissa Laurent MD     Scheduled Meds:    enoxparin  40 mg Subcutaneous Daily    furosemide  40 mg Oral Daily    lactulose  15 g Oral  Medication: metFORMIN (GLUCOPHAGE) 500 MG tablet   Last office visit date: 03/04/2024  Medication Refill Protocol Failed.   Hgb A1c less than 8 within last 6 months looking at last value      TID    pantoprazole  40 mg Oral Daily    potassium chloride  40 mEq Oral Q4H    sodium chloride 0.9%  10 mL Intravenous Q8H    spironolactone  100 mg Oral Daily     Continuous Infusions:   PRN Meds:acetaminophen, albuterol-ipratropium, aluminum-magnesium hydroxide-simethicone, dextrose 10%, dextrose 10%, diphenhydrAMINE, glucagon (human recombinant), glucose, glucose, melatonin, naloxone, ondansetron, polyethylene glycol, prochlorperazine, simethicone  Anticoagulants/Antiplatelets: no anticoagulation    Allergies: Review of patient's allergies indicates:  No Known Allergies  Sedation Hx: have not been any systemic reactions    Vitals:  Temp: 98 °F (36.7 °C) (12/28/23 1213)  Pulse: 72 (12/28/23 1426)  Resp: 18 (12/28/23 1426)  BP: 108/60 (12/28/23 1426)  SpO2: 98 % (12/28/23 1426)     Physical Exam:  ASA: 2  Mallampati: n/a    General: no acute distress  Mental Status: alert and oriented to person, place and time  HEENT: normocephalic, atraumatic  Chest: unlabored breathing  Heart: regular heart rate  Abdomen: distended  Extremity: moves all extremities    Plan: US guided paracentesis  Sedation Plan: local    Lakshmi Spence PA-C  Interventional Radiology  [phone]

## 2025-02-08 ENCOUNTER — HOSPITAL ENCOUNTER (EMERGENCY)
Facility: HOSPITAL | Age: 40
Discharge: PSYCHIATRIC OR SUBSTANCE USE FACILITY | End: 2025-02-08
Attending: EMERGENCY MEDICINE

## 2025-02-08 VITALS
WEIGHT: 199 LBS | SYSTOLIC BLOOD PRESSURE: 132 MMHG | RESPIRATION RATE: 18 BRPM | DIASTOLIC BLOOD PRESSURE: 56 MMHG | HEIGHT: 70 IN | BODY MASS INDEX: 28.49 KG/M2 | OXYGEN SATURATION: 98 % | TEMPERATURE: 98 F | HEART RATE: 78 BPM

## 2025-02-08 DIAGNOSIS — F23 ACUTE PSYCHOSIS: Primary | ICD-10-CM

## 2025-02-08 LAB
ALBUMIN SERPL BCP-MCNC: 4.3 G/DL (ref 3.5–5.2)
ALP SERPL-CCNC: 61 U/L (ref 40–150)
ALT SERPL W/O P-5'-P-CCNC: 20 U/L (ref 10–44)
AMMONIA PLAS-SCNC: 58 UMOL/L (ref 10–50)
AMPHET+METHAMPHET UR QL: NEGATIVE
ANION GAP SERPL CALC-SCNC: 14 MMOL/L (ref 8–16)
APAP SERPL-MCNC: <3 UG/ML (ref 10–20)
AST SERPL-CCNC: 31 U/L (ref 10–40)
BARBITURATES UR QL SCN>200 NG/ML: NEGATIVE
BASOPHILS # BLD AUTO: 0.03 K/UL (ref 0–0.2)
BASOPHILS NFR BLD: 0.6 % (ref 0–1.9)
BENZODIAZ UR QL SCN>200 NG/ML: NEGATIVE
BILIRUB SERPL-MCNC: 0.6 MG/DL (ref 0.1–1)
BILIRUB UR QL STRIP: NEGATIVE
BUN SERPL-MCNC: 11 MG/DL (ref 6–20)
BZE UR QL SCN: NEGATIVE
CALCIUM SERPL-MCNC: 9.9 MG/DL (ref 8.7–10.5)
CANNABINOIDS UR QL SCN: ABNORMAL
CHLORIDE SERPL-SCNC: 105 MMOL/L (ref 95–110)
CLARITY UR: CLEAR
CO2 SERPL-SCNC: 21 MMOL/L (ref 23–29)
COLOR UR: COLORLESS
CREAT SERPL-MCNC: 1 MG/DL (ref 0.5–1.4)
CREAT UR-MCNC: 17.5 MG/DL (ref 15–325)
DIFFERENTIAL METHOD BLD: ABNORMAL
EOSINOPHIL # BLD AUTO: 0.1 K/UL (ref 0–0.5)
EOSINOPHIL NFR BLD: 1.2 % (ref 0–8)
ERYTHROCYTE [DISTWIDTH] IN BLOOD BY AUTOMATED COUNT: 18.3 % (ref 11.5–14.5)
EST. GFR  (NO RACE VARIABLE): >60 ML/MIN/1.73 M^2
ETHANOL SERPL-MCNC: <10 MG/DL
GLUCOSE SERPL-MCNC: 131 MG/DL (ref 70–110)
GLUCOSE UR QL STRIP: NEGATIVE
HCT VFR BLD AUTO: 37.5 % (ref 37–48.5)
HCV AB SERPL QL IA: NEGATIVE
HEP C VIRUS HOLD SPECIMEN: NORMAL
HGB BLD-MCNC: 11.6 G/DL (ref 12–16)
HGB UR QL STRIP: NEGATIVE
IMM GRANULOCYTES # BLD AUTO: 0.02 K/UL (ref 0–0.04)
IMM GRANULOCYTES NFR BLD AUTO: 0.4 % (ref 0–0.5)
KETONES UR QL STRIP: NEGATIVE
LEUKOCYTE ESTERASE UR QL STRIP: NEGATIVE
LYMPHOCYTES # BLD AUTO: 1.1 K/UL (ref 1–4.8)
LYMPHOCYTES NFR BLD: 22.1 % (ref 18–48)
MCH RBC QN AUTO: 25.6 PG (ref 27–31)
MCHC RBC AUTO-ENTMCNC: 30.9 G/DL (ref 32–36)
MCV RBC AUTO: 83 FL (ref 82–98)
METHADONE UR QL SCN>300 NG/ML: NEGATIVE
MONOCYTES # BLD AUTO: 0.7 K/UL (ref 0.3–1)
MONOCYTES NFR BLD: 13.1 % (ref 4–15)
NEUTROPHILS # BLD AUTO: 3.1 K/UL (ref 1.8–7.7)
NEUTROPHILS NFR BLD: 62.6 % (ref 38–73)
NITRITE UR QL STRIP: NEGATIVE
NRBC BLD-RTO: 0 /100 WBC
OPIATES UR QL SCN: NEGATIVE
PCP UR QL SCN>25 NG/ML: NEGATIVE
PH UR STRIP: 7 [PH] (ref 5–8)
PLATELET # BLD AUTO: 132 K/UL (ref 150–450)
PMV BLD AUTO: 10.3 FL (ref 9.2–12.9)
POTASSIUM SERPL-SCNC: 3.4 MMOL/L (ref 3.5–5.1)
PROT SERPL-MCNC: 8.8 G/DL (ref 6–8.4)
PROT UR QL STRIP: NEGATIVE
RBC # BLD AUTO: 4.54 M/UL (ref 4–5.4)
SALICYLATES SERPL-MCNC: <5 MG/DL (ref 15–30)
SODIUM SERPL-SCNC: 140 MMOL/L (ref 136–145)
SP GR UR STRIP: 1 (ref 1–1.03)
TOXICOLOGY INFORMATION: ABNORMAL
TSH SERPL DL<=0.005 MIU/L-ACNC: 1.78 UIU/ML (ref 0.4–4)
URN SPEC COLLECT METH UR: ABNORMAL
UROBILINOGEN UR STRIP-ACNC: NEGATIVE EU/DL
WBC # BLD AUTO: 5.02 K/UL (ref 3.9–12.7)

## 2025-02-08 PROCEDURE — 80307 DRUG TEST PRSMV CHEM ANLYZR: CPT | Performed by: EMERGENCY MEDICINE

## 2025-02-08 PROCEDURE — 81003 URINALYSIS AUTO W/O SCOPE: CPT | Mod: 59 | Performed by: EMERGENCY MEDICINE

## 2025-02-08 PROCEDURE — 86803 HEPATITIS C AB TEST: CPT | Performed by: EMERGENCY MEDICINE

## 2025-02-08 PROCEDURE — 82140 ASSAY OF AMMONIA: CPT | Performed by: EMERGENCY MEDICINE

## 2025-02-08 PROCEDURE — 80053 COMPREHEN METABOLIC PANEL: CPT | Performed by: EMERGENCY MEDICINE

## 2025-02-08 PROCEDURE — 80179 DRUG ASSAY SALICYLATE: CPT | Performed by: EMERGENCY MEDICINE

## 2025-02-08 PROCEDURE — 99285 EMERGENCY DEPT VISIT HI MDM: CPT

## 2025-02-08 PROCEDURE — 84443 ASSAY THYROID STIM HORMONE: CPT | Performed by: EMERGENCY MEDICINE

## 2025-02-08 PROCEDURE — 85025 COMPLETE CBC W/AUTO DIFF WBC: CPT | Performed by: EMERGENCY MEDICINE

## 2025-02-08 PROCEDURE — 80143 DRUG ASSAY ACETAMINOPHEN: CPT | Performed by: EMERGENCY MEDICINE

## 2025-02-08 PROCEDURE — 82077 ASSAY SPEC XCP UR&BREATH IA: CPT | Performed by: EMERGENCY MEDICINE

## 2025-02-08 NOTE — ED PROVIDER NOTES
"SCRIBE #1 NOTE: I, Sanam Waldron, am scribing for, and in the presence of, Stephan Chadwick MD. I have scribed the HPI, ROS, and PEx.     SCRIBE #2 NOTE: I, Delicia James, am scribing for, and in the presence of,  Iker Campos MD. I have scribed the remaining portions of the note not scribed by Scribe #1.      History     Chief Complaint   Patient presents with    Mental Health Problem     Pt arrives to ED with sister who reports she received multiple calls from the pt saying that she was going to "blow her family up." Pt rambling in triage saying, "Every 18 seconds and 13 minutes the FBI and [employer] shock me so they can torture me. I need my body and my brain examined so they wont get away with it.  President Gudelia is in [place] and he has my heart." Pt has psychiatric hx and just discharged from psych facility two weeks ago.       Review of patient's allergies indicates:  No Known Allergies      History of Present Illness     HPI    2/8/2025, 1:22 AM  History obtained from the patient, medical records, and sister      History of Present Illness: Litzy Amato is a 39 y.o. female patient with a PMHx of cirrhosis of liver who presents to the Emergency Department for psychiatric evaluation. Per pt's sister, pt called her multiple times tonight saying she was going to "blow her family up." Pt's sister also reports that pt was talking to people who weren't there. Pt told her sister that she has a bomb strapped to her and that she is going to kill her sister and her family. No prior Tx specified.  No further complaints or concerns at this time.       Arrival mode: Personal Transportation    PCP: Mark Casey MD        Past Medical History:  Past Medical History:   Diagnosis Date    Unspecified cirrhosis of liver        Past Surgical History:  No past surgical history on file.      Family History:  No family history on file.    Social History:  Social History     Tobacco Use    Smoking status: " "Not on file    Smokeless tobacco: Not on file   Substance and Sexual Activity    Alcohol use: Not on file    Drug use: Not on file    Sexual activity: Not on file        Review of Systems     Review of Systems   Psychiatric/Behavioral:  Positive for hallucinations.       Physical Exam     Initial Vitals [02/08/25 0016]   BP Pulse Resp Temp SpO2   (!) 168/95 (!) 112 18 97.7 °F (36.5 °C) 98 %      MAP       --          Physical Exam  Nursing Notes and Vital Signs Reviewed.  Constitutional: Patient is in no acute distress. Well-developed and well-nourished.  Head: Atraumatic. Normocephalic.  Eyes: PERRL. EOM intact. Conjunctivae are not pale. No scleral icterus.  ENT: Mucous membranes are moist. Oropharynx is clear and symmetric.    Neck: Supple. Full ROM. No lymphadenopathy.  Cardiovascular: Regular rate. Regular rhythm. No murmurs, rubs, or gallops. Distal pulses are 2+ and symmetric.  Pulmonary/Chest: No respiratory distress. Clear to auscultation bilaterally. No wheezing or rales.  Abdominal: Soft and non-distended.  There is no tenderness.  No rebound, guarding, or rigidity. Good bowel sounds.  Genitourinary: No CVA tenderness.  Musculoskeletal: Moves all extremities. No obvious deformities. No edema. No calf tenderness.  Skin: Warm and dry.  Neurological:  Alert, awake, and appropriate.  Normal speech.  No acute focal neurological deficits are appreciated.  Psychiatric: Mumbling about Kenny. Poor insight and hallucinations.     ED Course   Procedures  ED Vital Signs:  Vitals:    02/08/25 0016 02/08/25 0312 02/08/25 0316   BP: (!) 168/95  127/77   Pulse: (!) 112  72   Resp: 18  16   Temp: 97.7 °F (36.5 °C) 97.7 °F (36.5 °C) 98.1 °F (36.7 °C)   TempSrc: Oral Oral Oral   SpO2: 98%  100%   Weight: 90.3 kg (199 lb)     Height: 5' 10" (1.778 m)         Abnormal Lab Results:  Labs Reviewed   CBC W/ AUTO DIFFERENTIAL - Abnormal       Result Value    WBC 5.02      RBC 4.54      Hemoglobin 11.6 (*)     Hematocrit 37.5      " MCV 83      MCH 25.6 (*)     MCHC 30.9 (*)     RDW 18.3 (*)     Platelets 132 (*)     MPV 10.3      Immature Granulocytes 0.4      Gran # (ANC) 3.1      Immature Grans (Abs) 0.02      Lymph # 1.1      Mono # 0.7      Eos # 0.1      Baso # 0.03      nRBC 0      Gran % 62.6      Lymph % 22.1      Mono % 13.1      Eosinophil % 1.2      Basophil % 0.6      Differential Method Automated      Narrative:     Release to patient->Immediate   COMPREHENSIVE METABOLIC PANEL - Abnormal    Sodium 140      Potassium 3.4 (*)     Chloride 105      CO2 21 (*)     Glucose 131 (*)     BUN 11      Creatinine 1.0      Calcium 9.9      Total Protein 8.8 (*)     Albumin 4.3      Total Bilirubin 0.6      Alkaline Phosphatase 61      AST 31      ALT 20      eGFR >60      Anion Gap 14      Narrative:     Release to patient->Immediate   URINALYSIS, REFLEX TO URINE CULTURE - Abnormal    Specimen UA Urine, Clean Catch      Color, UA Colorless (*)     Appearance, UA Clear      pH, UA 7.0      Specific Gravity, UA 1.005      Protein, UA Negative      Glucose, UA Negative      Ketones, UA Negative      Bilirubin (UA) Negative      Occult Blood UA Negative      Nitrite, UA Negative      Urobilinogen, UA Negative      Leukocytes, UA Negative      Narrative:     Specimen Source->Urine   DRUG SCREEN PANEL, URINE EMERGENCY - Abnormal    Benzodiazepines Negative      Methadone metabolites Negative      Cocaine (Metab.) Negative      Opiate Scrn, Ur Negative      Barbiturate Screen, Ur Negative      Amphetamine Screen, Ur Negative      THC Presumptive Positive (*)     Phencyclidine Negative      Creatinine, Urine 17.5      Toxicology Information SEE COMMENT      Narrative:     Specimen Source->Urine   SALICYLATE LEVEL - Abnormal    Salicylate Lvl <5.0 (*)     Narrative:     Release to patient->Immediate   ACETAMINOPHEN LEVEL - Abnormal    Acetaminophen (Tylenol), Serum <3.0 (*)     Narrative:     Release to patient->Immediate   AMMONIA - Abnormal     Ammonia 58 (*)     Narrative:     Release to patient->Immediate   HEPATITIS C ANTIBODY    Hepatitis C Ab Negative      Narrative:     Release to patient->Immediate   HEP C VIRUS HOLD SPECIMEN    HEP C Virus Hold Specimen Hold for HCV sendout      Narrative:     Release to patient->Immediate   TSH    TSH 1.781      Narrative:     Release to patient->Immediate   ALCOHOL,MEDICAL (ETHANOL)    Alcohol, Serum <10      Narrative:     Release to patient->Immediate        All Lab Results:  Results for orders placed or performed during the hospital encounter of 02/08/25   Urinalysis, Reflex to Urine Culture Urine, Clean Catch    Collection Time: 02/08/25 12:38 AM    Specimen: Urine   Result Value Ref Range    Specimen UA Urine, Clean Catch     Color, UA Colorless (A) Yellow, Straw, Juju    Appearance, UA Clear Clear    pH, UA 7.0 5.0 - 8.0    Specific Gravity, UA 1.005 1.005 - 1.030    Protein, UA Negative Negative    Glucose, UA Negative Negative    Ketones, UA Negative Negative    Bilirubin (UA) Negative Negative    Occult Blood UA Negative Negative    Nitrite, UA Negative Negative    Urobilinogen, UA Negative <2.0 EU/dL    Leukocytes, UA Negative Negative   Drug screen panel, in-house    Collection Time: 02/08/25 12:38 AM   Result Value Ref Range    Benzodiazepines Negative Negative    Methadone metabolites Negative Negative    Cocaine (Metab.) Negative Negative    Opiate Scrn, Ur Negative Negative    Barbiturate Screen, Ur Negative Negative    Amphetamine Screen, Ur Negative Negative    THC Presumptive Positive (A) Negative    Phencyclidine Negative Negative    Creatinine, Urine 17.5 15.0 - 325.0 mg/dL    Toxicology Information SEE COMMENT    Hepatitis C Antibody    Collection Time: 02/08/25 12:46 AM   Result Value Ref Range    Hepatitis C Ab Negative Negative   HCV Virus Hold Specimen    Collection Time: 02/08/25 12:46 AM   Result Value Ref Range    HEP C Virus Hold Specimen Hold for HCV sendout    CBC Auto Differential     Collection Time: 02/08/25 12:46 AM   Result Value Ref Range    WBC 5.02 3.90 - 12.70 K/uL    RBC 4.54 4.00 - 5.40 M/uL    Hemoglobin 11.6 (L) 12.0 - 16.0 g/dL    Hematocrit 37.5 37.0 - 48.5 %    MCV 83 82 - 98 fL    MCH 25.6 (L) 27.0 - 31.0 pg    MCHC 30.9 (L) 32.0 - 36.0 g/dL    RDW 18.3 (H) 11.5 - 14.5 %    Platelets 132 (L) 150 - 450 K/uL    MPV 10.3 9.2 - 12.9 fL    Immature Granulocytes 0.4 0.0 - 0.5 %    Gran # (ANC) 3.1 1.8 - 7.7 K/uL    Immature Grans (Abs) 0.02 0.00 - 0.04 K/uL    Lymph # 1.1 1.0 - 4.8 K/uL    Mono # 0.7 0.3 - 1.0 K/uL    Eos # 0.1 0.0 - 0.5 K/uL    Baso # 0.03 0.00 - 0.20 K/uL    nRBC 0 0 /100 WBC    Gran % 62.6 38.0 - 73.0 %    Lymph % 22.1 18.0 - 48.0 %    Mono % 13.1 4.0 - 15.0 %    Eosinophil % 1.2 0.0 - 8.0 %    Basophil % 0.6 0.0 - 1.9 %    Differential Method Automated    Comprehensive Metabolic Panel    Collection Time: 02/08/25 12:46 AM   Result Value Ref Range    Sodium 140 136 - 145 mmol/L    Potassium 3.4 (L) 3.5 - 5.1 mmol/L    Chloride 105 95 - 110 mmol/L    CO2 21 (L) 23 - 29 mmol/L    Glucose 131 (H) 70 - 110 mg/dL    BUN 11 6 - 20 mg/dL    Creatinine 1.0 0.5 - 1.4 mg/dL    Calcium 9.9 8.7 - 10.5 mg/dL    Total Protein 8.8 (H) 6.0 - 8.4 g/dL    Albumin 4.3 3.5 - 5.2 g/dL    Total Bilirubin 0.6 0.1 - 1.0 mg/dL    Alkaline Phosphatase 61 40 - 150 U/L    AST 31 10 - 40 U/L    ALT 20 10 - 44 U/L    eGFR >60 >60 mL/min/1.73 m^2    Anion Gap 14 8 - 16 mmol/L   TSH    Collection Time: 02/08/25 12:46 AM   Result Value Ref Range    TSH 1.781 0.400 - 4.000 uIU/mL   Ethanol    Collection Time: 02/08/25 12:46 AM   Result Value Ref Range    Alcohol, Serum <10 <10 mg/dL   Salicylate Level    Collection Time: 02/08/25 12:46 AM   Result Value Ref Range    Salicylate Lvl <5.0 (L) 15.0 - 30.0 mg/dL   Acetaminophen Level    Collection Time: 02/08/25 12:46 AM   Result Value Ref Range    Acetaminophen (Tylenol), Serum <3.0 (L) 10.0 - 20.0 ug/mL   Ammonia    Collection Time: 02/08/25 12:46 AM    Result Value Ref Range    Ammonia 58 (H) 10 - 50 umol/L       Imaging Results:  Imaging Results    None               The Emergency Provider reviewed the vital signs and test results, which are outlined above.     ED Discussion     12:40 AM: The PEC hold has been issued by Dr. Chadwick at this time for the following: being gravely disabled, unwilling, and unable to seek voluntary admission.    2:00 AM: Dr. Chadwick transfers care of patient to Dr. Campos pending lab results.     2:35 AM: Dr. Campos agrees with Dr. Chadwick's assessment and plan of care. Dr. Campos evaluated pt. Pt is resting comfortably and is in no acute distress.  D/w pt all pertinent results. D/w pt any concerns expressed at this time. Answered all questions. Pt expresses understanding at this time.    3:40 AM: Pt has been medically cleared by Dr. Campos at this time. Reassessed pt at this time. Pt is resting comfortably and appears in no acute distress. There are no psychiatric services offered at this facility. D/w pt all pertinent ED information and plan to transfer to psychiatric facility for psychiatric treatment. Pt verbalizes understanding. [facility]. Pt has been made aware of all risks and benefits associated with transfer, including but not limited to death, MVC, loss of vital signs, and/or permanent disability. Benefits include ability to be treated at an inpatient psychiatric facility. Pt will be transported by personnel trained in CPR and CPI. Patient understands that there could be unforeseen motor vehicle accidents, inclement weather, or loss of vital signs that could result in potential death or permanent disability. All questions and complaints have been addressed at this time. Pt condition is stable at this time and is clear to transfer to psychiatric facility at this time.        Medical Decision Making  md Payton  3510    38 yo wf with history of  paranoid and grandiose delusions.  Medically cleared for psychiatric admission    Amount and/or Complexity of Data Reviewed  Labs: ordered. Decision-making details documented in ED Course.                ED Medication(s):  Medications - No data to display    New Prescriptions    No medications on file               Scribe Attestation:   Scribe #1: I performed the above scribed service and the documentation accurately describes the services I performed. I attest to the accuracy of the note.     Attending:   Physician Attestation Statement for Scribe #1: I, Stephan Chadwick MD, personally performed the services described in this documentation, as scribed by Sanam Waldron, in my presence, and it is both accurate and complete.       Scribe Attestation:   Scribe #2: I performed the above scribed service and the documentation accurately describes the services I performed. I attest to the accuracy of the note.    Attending Attestation:           Physician Attestation for Scribe:    Physician Attestation Statement for Scribe #2: I, Iker Campos MD, reviewed documentation, as scribed by Delicia James in my presence, and it is both accurate and complete. I also acknowledge and confirm the content of the note done by Scribe #1.           Clinical Impression       ICD-10-CM ICD-9-CM   1. Acute psychosis  F23 298.9       Disposition:   Disposition: Transferred  Condition: Iker Duron MD  02/08/25 0515

## 2025-02-08 NOTE — ED NOTES
Pt with flight of ideas and delusions, unable to participate with assessment questions. Rambling that she is 43 seconds ahead of all of us, and mumbling stuff about the FBI and [employer].

## 2025-02-08 NOTE — ED NOTES
Pt items:     1x pair of socks  1x bra   1x pair of slippers   1x red shirt   2x hair pins   1x sunglasses  2x rubber band   1x pants  1x jacket

## 2025-02-08 NOTE — ED NOTES
Attempted to call [facility] to give patient report. Staff who answered the phone stated that Melissa will be the patient's nurse and she will give me a call back to take patient report after she finishes passing medications.   significant other

## 2025-02-18 ENCOUNTER — APPOINTMENT (OUTPATIENT)
Dept: RADIOLOGY | Facility: HOSPITAL | Age: 40
End: 2025-02-18
Attending: INTERNAL MEDICINE
Payer: MEDICAID

## 2025-02-18 ENCOUNTER — RESULTS FOLLOW-UP (OUTPATIENT)
Dept: TRANSPLANT | Facility: CLINIC | Age: 40
End: 2025-02-18
Payer: MEDICAID

## 2025-02-18 DIAGNOSIS — K70.9 ALCOHOLIC LIVER DISEASE: ICD-10-CM

## 2025-02-18 PROCEDURE — 76705 ECHO EXAM OF ABDOMEN: CPT | Mod: 26,,, | Performed by: RADIOLOGY

## 2025-02-18 PROCEDURE — 76705 ECHO EXAM OF ABDOMEN: CPT | Mod: TC,PO

## 2025-02-19 ENCOUNTER — TELEPHONE (OUTPATIENT)
Dept: HEPATOLOGY | Facility: CLINIC | Age: 40
End: 2025-02-19
Payer: MEDICAID

## 2025-02-19 DIAGNOSIS — K72.90 DECOMPENSATED HEPATIC CIRRHOSIS: Primary | ICD-10-CM

## 2025-02-19 DIAGNOSIS — K74.60 DECOMPENSATED HEPATIC CIRRHOSIS: Primary | ICD-10-CM

## 2025-02-19 DIAGNOSIS — E87.1 HYPONATREMIA: ICD-10-CM

## 2025-02-19 NOTE — TELEPHONE ENCOUNTER
Spoke with the pt and got her Labs and US 6 months appointment scheduled in [place] for Aug 4.

## 2025-02-19 NOTE — TELEPHONE ENCOUNTER
----- Message from Magalis Min MD sent at 2/18/2025  7:06 PM CST -----  Please inform patient:   Ultrasound showed cirrhosis, a small cyst in the liver, and gallstones. No tumor seen in the liver.   Continue liver cancer surveillance every 6 months, with ultrasound limited and AFP, next due in August 2025.   ----- Message -----  From: Interface, Rad Results In  Sent: 2/18/2025   2:19 PM CST  To: Magalis Min MD

## 2025-02-19 NOTE — TELEPHONE ENCOUNTER
Called pt but went straight to voicemail. Left  to have for her  call back so I can schedule her Labs and US appointment in Aug per Dr. Min.

## 2025-02-20 ENCOUNTER — TELEPHONE (OUTPATIENT)
Dept: INTERVENTIONAL RADIOLOGY/VASCULAR | Facility: HOSPITAL | Age: 40
End: 2025-02-20
Payer: MEDICAID

## 2025-02-20 NOTE — PROGRESS NOTES
No fluid on US from 2/18/2025. messaged DR. Min's staff since she is offline. Also tried to call patient to discuss prior to patient driving all the way here. No answer.    Lakshmi Spence PA-C  Interventional Radiology  [phone]

## 2025-03-05 ENCOUNTER — HOSPITAL ENCOUNTER (EMERGENCY)
Facility: HOSPITAL | Age: 40
Discharge: HOME | End: 2025-03-05
Attending: EMERGENCY MEDICINE
Payer: MEDICAID

## 2025-03-05 VITALS
HEIGHT: 70 IN | RESPIRATION RATE: 20 BRPM | SYSTOLIC BLOOD PRESSURE: 112 MMHG | OXYGEN SATURATION: 99 % | WEIGHT: 215.63 LBS | TEMPERATURE: 98 F | BODY MASS INDEX: 30.87 KG/M2 | HEART RATE: 60 BPM | DIASTOLIC BLOOD PRESSURE: 66 MMHG

## 2025-03-05 DIAGNOSIS — R79.89 INCREASED AMMONIA LEVEL: ICD-10-CM

## 2025-03-05 DIAGNOSIS — K70.30 ALCOHOLIC CIRRHOSIS OF LIVER WITHOUT ASCITES: Primary | ICD-10-CM

## 2025-03-05 DIAGNOSIS — R07.9 CHEST PAIN: ICD-10-CM

## 2025-03-05 DIAGNOSIS — Z76.0 ENCOUNTER FOR MEDICATION REFILL: ICD-10-CM

## 2025-03-05 LAB
ALBUMIN SERPL BCP-MCNC: 4 G/DL (ref 3.5–5.2)
ALP SERPL-CCNC: 53 U/L (ref 40–150)
ALT SERPL W/O P-5'-P-CCNC: 28 U/L (ref 10–44)
AMMONIA PLAS-SCNC: 68 UMOL/L (ref 10–50)
ANION GAP SERPL CALC-SCNC: 11 MMOL/L (ref 8–16)
APTT PPP: 27.1 SEC (ref 21–32)
AST SERPL-CCNC: 47 U/L (ref 10–40)
BASOPHILS # BLD AUTO: 0.02 K/UL (ref 0–0.2)
BASOPHILS NFR BLD: 0.6 % (ref 0–1.9)
BILIRUB SERPL-MCNC: 0.7 MG/DL (ref 0.1–1)
BILIRUB UR QL STRIP: NEGATIVE
BUN SERPL-MCNC: 12 MG/DL (ref 6–20)
CALCIUM SERPL-MCNC: 9.2 MG/DL (ref 8.7–10.5)
CHLORIDE SERPL-SCNC: 107 MMOL/L (ref 95–110)
CLARITY UR: ABNORMAL
CO2 SERPL-SCNC: 21 MMOL/L (ref 23–29)
COLOR UR: YELLOW
CREAT SERPL-MCNC: 0.9 MG/DL (ref 0.5–1.4)
DIFFERENTIAL METHOD BLD: ABNORMAL
EOSINOPHIL # BLD AUTO: 0 K/UL (ref 0–0.5)
EOSINOPHIL NFR BLD: 1 % (ref 0–8)
ERYTHROCYTE [DISTWIDTH] IN BLOOD BY AUTOMATED COUNT: 19.6 % (ref 11.5–14.5)
EST. GFR  (NO RACE VARIABLE): >60 ML/MIN/1.73 M^2
GLUCOSE SERPL-MCNC: 68 MG/DL (ref 70–110)
GLUCOSE UR QL STRIP: NEGATIVE
GROUP A STREP, MOLECULAR: NEGATIVE
HCT VFR BLD AUTO: 36.2 % (ref 37–48.5)
HGB BLD-MCNC: 11.2 G/DL (ref 12–16)
HGB UR QL STRIP: NEGATIVE
IMM GRANULOCYTES # BLD AUTO: 0 K/UL (ref 0–0.04)
IMM GRANULOCYTES NFR BLD AUTO: 0 % (ref 0–0.5)
INFLUENZA A, MOLECULAR: NEGATIVE
INFLUENZA B, MOLECULAR: NEGATIVE
INR PPP: 1.1 (ref 0.8–1.2)
KETONES UR QL STRIP: NEGATIVE
LEUKOCYTE ESTERASE UR QL STRIP: NEGATIVE
LYMPHOCYTES # BLD AUTO: 0.7 K/UL (ref 1–4.8)
LYMPHOCYTES NFR BLD: 22.2 % (ref 18–48)
MCH RBC QN AUTO: 26.7 PG (ref 27–31)
MCHC RBC AUTO-ENTMCNC: 30.9 G/DL (ref 32–36)
MCV RBC AUTO: 86 FL (ref 82–98)
MONOCYTES # BLD AUTO: 0.3 K/UL (ref 0.3–1)
MONOCYTES NFR BLD: 9 % (ref 4–15)
NEUTROPHILS # BLD AUTO: 2.1 K/UL (ref 1.8–7.7)
NEUTROPHILS NFR BLD: 67.2 % (ref 38–73)
NITRITE UR QL STRIP: NEGATIVE
NRBC BLD-RTO: 0 /100 WBC
PH UR STRIP: 7 [PH] (ref 5–8)
PLATELET # BLD AUTO: 96 K/UL (ref 150–450)
PMV BLD AUTO: 10.2 FL (ref 9.2–12.9)
POCT GLUCOSE: 84 MG/DL (ref 70–110)
POTASSIUM SERPL-SCNC: 3.9 MMOL/L (ref 3.5–5.1)
PROT SERPL-MCNC: 8.3 G/DL (ref 6–8.4)
PROT UR QL STRIP: NEGATIVE
PROTHROMBIN TIME: 12.8 SEC (ref 9–12.5)
RBC # BLD AUTO: 4.19 M/UL (ref 4–5.4)
SARS-COV-2 RDRP RESP QL NAA+PROBE: NEGATIVE
SODIUM SERPL-SCNC: 139 MMOL/L (ref 136–145)
SP GR UR STRIP: 1.02 (ref 1–1.03)
SPECIMEN SOURCE: NORMAL
TROPONIN I SERPL DL<=0.01 NG/ML-MCNC: <0.006 NG/ML (ref 0–0.03)
URN SPEC COLLECT METH UR: ABNORMAL
UROBILINOGEN UR STRIP-ACNC: NEGATIVE EU/DL
WBC # BLD AUTO: 3.11 K/UL (ref 3.9–12.7)

## 2025-03-05 PROCEDURE — 63600175 PHARM REV CODE 636 W HCPCS: Performed by: NURSE PRACTITIONER

## 2025-03-05 PROCEDURE — 93010 ELECTROCARDIOGRAM REPORT: CPT | Mod: ,,, | Performed by: INTERNAL MEDICINE

## 2025-03-05 PROCEDURE — 82962 GLUCOSE BLOOD TEST: CPT

## 2025-03-05 PROCEDURE — 99285 EMERGENCY DEPT VISIT HI MDM: CPT | Mod: 25

## 2025-03-05 PROCEDURE — 84484 ASSAY OF TROPONIN QUANT: CPT | Performed by: NURSE PRACTITIONER

## 2025-03-05 PROCEDURE — 85610 PROTHROMBIN TIME: CPT | Performed by: NURSE PRACTITIONER

## 2025-03-05 PROCEDURE — 87651 STREP A DNA AMP PROBE: CPT | Performed by: EMERGENCY MEDICINE

## 2025-03-05 PROCEDURE — 85025 COMPLETE CBC W/AUTO DIFF WBC: CPT | Performed by: NURSE PRACTITIONER

## 2025-03-05 PROCEDURE — 82140 ASSAY OF AMMONIA: CPT | Performed by: NURSE PRACTITIONER

## 2025-03-05 PROCEDURE — 87502 INFLUENZA DNA AMP PROBE: CPT | Performed by: EMERGENCY MEDICINE

## 2025-03-05 PROCEDURE — 93005 ELECTROCARDIOGRAM TRACING: CPT

## 2025-03-05 PROCEDURE — 80053 COMPREHEN METABOLIC PANEL: CPT | Performed by: NURSE PRACTITIONER

## 2025-03-05 PROCEDURE — 85730 THROMBOPLASTIN TIME PARTIAL: CPT | Performed by: NURSE PRACTITIONER

## 2025-03-05 PROCEDURE — 87635 SARS-COV-2 COVID-19 AMP PRB: CPT | Performed by: EMERGENCY MEDICINE

## 2025-03-05 PROCEDURE — 96365 THER/PROPH/DIAG IV INF INIT: CPT

## 2025-03-05 PROCEDURE — 25000003 PHARM REV CODE 250: Performed by: NURSE PRACTITIONER

## 2025-03-05 PROCEDURE — 81003 URINALYSIS AUTO W/O SCOPE: CPT | Performed by: NURSE PRACTITIONER

## 2025-03-05 PROCEDURE — 25000003 PHARM REV CODE 250: Performed by: EMERGENCY MEDICINE

## 2025-03-05 RX ORDER — SPIRONOLACTONE 100 MG/1
100 TABLET, FILM COATED ORAL DAILY
Qty: 20 TABLET | Refills: 0 | Status: SHIPPED | OUTPATIENT
Start: 2025-03-05 | End: 2025-03-11

## 2025-03-05 RX ORDER — TRAZODONE HYDROCHLORIDE 50 MG/1
50 TABLET ORAL
COMMUNITY

## 2025-03-05 RX ORDER — SPIRONOLACTONE 25 MG/1
100 TABLET ORAL
Status: COMPLETED | OUTPATIENT
Start: 2025-03-05 | End: 2025-03-05

## 2025-03-05 RX ORDER — IBUPROFEN 400 MG/1
400 TABLET ORAL
Status: COMPLETED | OUTPATIENT
Start: 2025-03-05 | End: 2025-03-05

## 2025-03-05 RX ORDER — GABAPENTIN 300 MG/1
300 CAPSULE ORAL ONCE
Status: COMPLETED | OUTPATIENT
Start: 2025-03-05 | End: 2025-03-05

## 2025-03-05 RX ORDER — PALIPERIDONE 3 MG/1
TABLET, EXTENDED RELEASE ORAL
COMMUNITY
Start: 2025-01-17

## 2025-03-05 RX ORDER — LACTULOSE 10 G/10G
20 SOLUTION ORAL 3 TIMES DAILY
Qty: 60 PACKET | Refills: 0 | Status: SHIPPED | OUTPATIENT
Start: 2025-03-05 | End: 2025-03-11

## 2025-03-05 RX ORDER — FUROSEMIDE 40 MG/1
40 TABLET ORAL DAILY
Qty: 20 TABLET | Refills: 0 | Status: SHIPPED | OUTPATIENT
Start: 2025-03-05

## 2025-03-05 RX ORDER — GABAPENTIN 300 MG/1
300 CAPSULE ORAL ONCE
Status: DISCONTINUED | OUTPATIENT
Start: 2025-03-06 | End: 2025-03-05

## 2025-03-05 RX ORDER — GABAPENTIN 300 MG/1
300 CAPSULE ORAL 3 TIMES DAILY PRN
Qty: 60 CAPSULE | Refills: 0 | Status: SHIPPED | OUTPATIENT
Start: 2025-03-05

## 2025-03-05 RX ADMIN — SPIRONOLACTONE 100 MG: 25 TABLET, FILM COATED ORAL at 11:03

## 2025-03-05 RX ADMIN — PROMETHAZINE HYDROCHLORIDE 12.5 MG: 25 INJECTION INTRAMUSCULAR; INTRAVENOUS at 06:03

## 2025-03-05 RX ADMIN — LACTULOSE 30 G: 20 SOLUTION ORAL at 09:03

## 2025-03-05 RX ADMIN — GABAPENTIN 300 MG: 300 CAPSULE ORAL at 11:03

## 2025-03-05 RX ADMIN — IBUPROFEN 400 MG: 400 TABLET ORAL at 11:03

## 2025-03-05 NOTE — FIRST PROVIDER EVALUATION
Medical screening examination initiated.  I have conducted a focused provider triage encounter, findings are as follows:    Brief history of present illness:  PT. C/o abd pain, chest pain, hematemesis    Vitals:    03/05/25 1429   BP: (!) 108/57   BP Location: Right arm   Pulse: 81   Resp: 20   Temp: 97.7 °F (36.5 °C)   TempSrc: Oral   SpO2: 98%   Weight: 97.8 kg (215 lb 9.6 oz)       Pertinent physical exam:  nad    Brief workup plan:  labs    Preliminary workup initiated; this workup will be continued and followed by the physician or advanced practice provider that is assigned to the patient when roomed.

## 2025-03-06 LAB
OHS QRS DURATION: 90 MS
OHS QTC CALCULATION: 447 MS

## 2025-03-06 NOTE — ED PROVIDER NOTES
SCRIBE #1 NOTE: I, Nubia Massey, am scribing for, and in the presence of, Biju Gore MD. I have scribed the entire note.       History     Chief Complaint   Patient presents with    Abdominal Pain     Pt with history of cirrhosis c/o abdominal pain, abdominal fullness, vomiting blood, constipation, and foggy memory.      Review of patient's allergies indicates:   Allergen Reactions    Benadryl [diphenhydramine hcl]     Ondansetron          History of Present Illness     HPI    3/5/2025, 8:35 PM  History obtained from the patient and medical records      History of Present Illness: Litzy Amato is a 39 y.o. female patient with a PMHx of unspecified cirrhosis of liver who presents to the Emergency Department for evaluation of abdominal pain which began gradually over the past 3 weeks. Pt complains of body aches all over. Pt says she was recently prescribed Trazodone on 2/14. Symptoms are constant and moderate in severity. No mitigating or exacerbating factors reported. Associated sxs include tinnitus in left ear, chills, headache, vomiting with bright red blood, nausea, constipated, dizziness, sore throat, sinus pain, left ear pain, and SOB . Patient denies any coughing or blood in stool. Pt has not vomited since being in ED No prior Tx specified. Pt denies any EtOH use.No further complaints or concerns at this time.       Arrival mode: Personal Transportation    PCP: Mark Casey MD        Past Medical History:  Past Medical History:   Diagnosis Date    Unspecified cirrhosis of liver        Past Surgical History:  History reviewed. No pertinent surgical history.      Family History:  No family history on file.    Social History:  Social History     Tobacco Use    Smoking status: Every Day     Types: Vaping with nicotine    Smokeless tobacco: Never   Substance and Sexual Activity    Alcohol use: Not on file    Drug use: Never    Sexual activity: Not on file        Review of Systems     Review of  Systems   Constitutional:  Positive for chills. Negative for fever.   HENT:  Positive for ear pain (Left), sinus pain, sore throat and tinnitus (Left ear).    Respiratory:  Positive for shortness of breath. Negative for cough.    Cardiovascular:  Negative for chest pain.   Gastrointestinal:  Positive for abdominal pain, constipation, nausea and vomiting (W/ bright red blood). Negative for blood in stool.   Genitourinary:  Negative for dysuria.   Musculoskeletal:  Negative for back pain.   Skin:  Negative for rash.   Neurological:  Positive for dizziness, weakness and headaches.   Hematological:  Does not bruise/bleed easily.   All other systems reviewed and are negative.     Physical Exam     Initial Vitals [03/05/25 1429]   BP Pulse Resp Temp SpO2   (!) 108/57 81 20 97.7 °F (36.5 °C) 98 %      MAP       --          Physical Exam  Nursing Notes and Vital Signs Reviewed.  Constitutional: Patient is in no acute distress. Well-developed and well-nourished.  Head: Atraumatic. Normocephalic.  Eyes: PERRL. EOM intact. Conjunctivae are not pale. No scleral icterus.  ENT: Mucous membranes are moist.   Neck: Supple. Full ROM.  Cardiovascular: Regular rate. Regular rhythm. No murmurs, rubs, or gallops. Distal pulses are 2+ and symmetric.  Pulmonary/Chest: No respiratory distress. Clear to auscultation bilaterally. No wheezing or rales.  Abdominal: Soft and non-distended.  There is no tenderness.  No rebound, guarding, or rigidity.   Genitourinary: No CVA tenderness  Musculoskeletal: Moves all extremities. No obvious deformities. No edema.   Skin: Warm and dry.  Neurological:  Alert, awake, and appropriate.  Normal speech.  No acute focal neurological deficits are appreciated.  Psychiatric: Normal affect. Good eye contact. Appropriate in content.       ED Course   Procedures  ED Vital Signs:  Vitals:    03/05/25 1429 03/05/25 1715 03/05/25 2004 03/05/25 2130   BP: (!) 108/57 (!) 100/48  (!) 113/56   Pulse: 81 80  (!) 56  "  Resp: 20 20     Temp: 97.7 °F (36.5 °C) 97.8 °F (36.6 °C)     TempSrc: Oral      SpO2: 98% 98%  100%   Weight: 97.8 kg (215 lb 9.6 oz)  97.8 kg (215 lb 9.8 oz)    Height:   5' 10" (1.778 m)     03/05/25 2230 03/05/25 2245 03/05/25 2257   BP:  114/62    Pulse: 66 (!) 45 (!) 58   Resp:      Temp:      TempSrc:      SpO2: 99% 100% 100%   Weight:      Height:          Abnormal Lab Results:  Labs Reviewed   CBC W/ AUTO DIFFERENTIAL - Abnormal       Result Value    WBC 3.11 (*)     RBC 4.19      Hemoglobin 11.2 (*)     Hematocrit 36.2 (*)     MCV 86      MCH 26.7 (*)     MCHC 30.9 (*)     RDW 19.6 (*)     Platelets 96 (*)     MPV 10.2      Immature Granulocytes 0.0      Gran # (ANC) 2.1      Immature Grans (Abs) 0.00      Lymph # 0.7 (*)     Mono # 0.3      Eos # 0.0      Baso # 0.02      nRBC 0      Gran % 67.2      Lymph % 22.2      Mono % 9.0      Eosinophil % 1.0      Basophil % 0.6      Differential Method Automated     COMPREHENSIVE METABOLIC PANEL - Abnormal    Sodium 139      Potassium 3.9      Chloride 107      CO2 21 (*)     Glucose 68 (*)     BUN 12      Creatinine 0.9      Calcium 9.2      Total Protein 8.3      Albumin 4.0      Total Bilirubin 0.7      Alkaline Phosphatase 53      AST 47 (*)     ALT 28      eGFR >60      Anion Gap 11     URINALYSIS, REFLEX TO URINE CULTURE - Abnormal    Specimen UA Urine, Clean Catch      Color, UA Yellow      Appearance, UA Hazy (*)     pH, UA 7.0      Specific Gravity, UA 1.020      Protein, UA Negative      Glucose, UA Negative      Ketones, UA Negative      Bilirubin (UA) Negative      Occult Blood UA Negative      Nitrite, UA Negative      Urobilinogen, UA Negative      Leukocytes, UA Negative      Narrative:     Specimen Source->Urine   PROTIME-INR - Abnormal    Prothrombin Time 12.8 (*)     INR 1.1     AMMONIA - Abnormal    Ammonia 68 (*)    INFLUENZA A & B BY MOLECULAR    Influenza A, Molecular Negative      Influenza B, Molecular Negative      Flu A & B Source " Nasal swab     GROUP A STREP, MOLECULAR    Group A Strep, Molecular Negative     APTT    aPTT 27.1     TROPONIN I    Troponin I <0.006     SARS-COV-2 RNA AMPLIFICATION, QUAL    SARS-CoV-2 RNA, Amplification, Qual Negative          All Lab Results:  Results for orders placed or performed during the hospital encounter of 03/05/25   EKG 12-lead (Chest Pain) Age >30    Collection Time: 03/05/25  2:34 PM   Result Value Ref Range    QRS Duration 90 ms    [facility] QTC Calculation 447 ms   Urinalysis, Reflex to Urine Culture Urine, Clean Catch    Collection Time: 03/05/25  4:40 PM    Specimen: Urine, Clean Catch   Result Value Ref Range    Specimen UA Urine, Clean Catch     Color, UA Yellow Yellow, Straw, Juju    Appearance, UA Hazy (A) Clear    pH, UA 7.0 5.0 - 8.0    Specific Gravity, UA 1.020 1.005 - 1.030    Protein, UA Negative Negative    Glucose, UA Negative Negative    Ketones, UA Negative Negative    Bilirubin (UA) Negative Negative    Occult Blood UA Negative Negative    Nitrite, UA Negative Negative    Urobilinogen, UA Negative <2.0 EU/dL    Leukocytes, UA Negative Negative   CBC auto differential    Collection Time: 03/05/25  4:49 PM   Result Value Ref Range    WBC 3.11 (L) 3.90 - 12.70 K/uL    RBC 4.19 4.00 - 5.40 M/uL    Hemoglobin 11.2 (L) 12.0 - 16.0 g/dL    Hematocrit 36.2 (L) 37.0 - 48.5 %    MCV 86 82 - 98 fL    MCH 26.7 (L) 27.0 - 31.0 pg    MCHC 30.9 (L) 32.0 - 36.0 g/dL    RDW 19.6 (H) 11.5 - 14.5 %    Platelets 96 (L) 150 - 450 K/uL    MPV 10.2 9.2 - 12.9 fL    Immature Granulocytes 0.0 0.0 - 0.5 %    Gran # (ANC) 2.1 1.8 - 7.7 K/uL    Immature Grans (Abs) 0.00 0.00 - 0.04 K/uL    Lymph # 0.7 (L) 1.0 - 4.8 K/uL    Mono # 0.3 0.3 - 1.0 K/uL    Eos # 0.0 0.0 - 0.5 K/uL    Baso # 0.02 0.00 - 0.20 K/uL    nRBC 0 0 /100 WBC    Gran % 67.2 38.0 - 73.0 %    Lymph % 22.2 18.0 - 48.0 %    Mono % 9.0 4.0 - 15.0 %    Eosinophil % 1.0 0.0 - 8.0 %    Basophil % 0.6 0.0 - 1.9 %    Differential Method Automated     Comprehensive metabolic panel    Collection Time: 03/05/25  4:49 PM   Result Value Ref Range    Sodium 139 136 - 145 mmol/L    Potassium 3.9 3.5 - 5.1 mmol/L    Chloride 107 95 - 110 mmol/L    CO2 21 (L) 23 - 29 mmol/L    Glucose 68 (L) 70 - 110 mg/dL    BUN 12 6 - 20 mg/dL    Creatinine 0.9 0.5 - 1.4 mg/dL    Calcium 9.2 8.7 - 10.5 mg/dL    Total Protein 8.3 6.0 - 8.4 g/dL    Albumin 4.0 3.5 - 5.2 g/dL    Total Bilirubin 0.7 0.1 - 1.0 mg/dL    Alkaline Phosphatase 53 40 - 150 U/L    AST 47 (H) 10 - 40 U/L    ALT 28 10 - 44 U/L    eGFR >60 >60 mL/min/1.73 m^2    Anion Gap 11 8 - 16 mmol/L   Protime-INR    Collection Time: 03/05/25  4:49 PM   Result Value Ref Range    Prothrombin Time 12.8 (H) 9.0 - 12.5 sec    INR 1.1 0.8 - 1.2   APTT    Collection Time: 03/05/25  4:49 PM   Result Value Ref Range    aPTT 27.1 21.0 - 32.0 sec   Ammonia    Collection Time: 03/05/25  4:49 PM   Result Value Ref Range    Ammonia 68 (H) 10 - 50 umol/L   Troponin I    Collection Time: 03/05/25  4:49 PM   Result Value Ref Range    Troponin I <0.006 0.000 - 0.026 ng/mL   Influenza A & B by Molecular    Collection Time: 03/05/25  9:17 PM    Specimen: Nasopharyngeal Swab   Result Value Ref Range    Influenza A, Molecular Negative Negative    Influenza B, Molecular Negative Negative    Flu A & B Source Nasal swab    Group A Strep, Molecular    Collection Time: 03/05/25  9:17 PM    Specimen: Throat   Result Value Ref Range    Group A Strep, Molecular Negative Negative   COVID-19 Rapid Screening    Collection Time: 03/05/25  9:17 PM   Result Value Ref Range    SARS-CoV-2 RNA, Amplification, Qual Negative Negative       Imaging Results:  Imaging Results              US Abdomen Limited (Final result)  Result time 03/05/25 15:50:27      Final result by Stas Sargent MD (03/05/25 15:50:27)                   Impression:      1.  Configuration of the liver capsule consistent with cirrhosis.  No focal parenchymal abnormalities are  identified.    2.  Small dependent gallstones in the gallbladder lumen.  No secondary findings indicating acute cholecystitis are visible.      Electronically signed by: Stas Sargent  Date:    03/05/2025  Time:    15:50               Narrative:    EXAMINATION:  US ABDOMEN LIMITED    CLINICAL HISTORY:  abdomen pain and distention;    TECHNIQUE:  Limited ultrasound of the right upper quadrant of the abdomen (including pancreas, liver, gallbladder, common bile duct, and spleen) was performed.    COMPARISON:  02/18/2025    FINDINGS:  Liver: Normal in size, measuring 16.4 cm. Homogeneous echotexture. No focal parenchymal lesions are identified although a lobular contour of the liver capsule is visible consistent with cirrhosis    Gallbladder: The gallbladder is incompletely distended.  There are multiple small dependent gallstones identified in the gallbladder lumen.  No wall thickening or pericholecystic fluid is visible.    Biliary system: The common duct is not dilated, measuring 5 mm.  No intrahepatic ductal dilatation.    Right kidney: The right kidney measures 11.6 cm in length and demonstrates no calcification, hydronephrosis or focal parenchymal abnormality    Miscellaneous: No upper abdominal ascites.                                       X-Ray Chest 1 View (Final result)  Result time 03/05/25 15:10:43      Final result by Stas Sargent MD (03/05/25 15:10:43)                   Impression:      No evidence of acute disease.      Electronically signed by: Stas Sargent  Date:    03/05/2025  Time:    15:10               Narrative:    EXAMINATION:  XR CHEST 1 VIEW    CLINICAL HISTORY:  Chest pain, unspecified    TECHNIQUE:  Single frontal view of the chest was performed.    COMPARISON:  12/27/2023    FINDINGS:  Heart size is normal and lungs are clear bilaterally.  Pulmonary vasculature is normal.                                       The EKG was ordered, reviewed, and independently interpreted by  the ED provider.  Interpretation time: 4:34  Rate: 87 BPM  Rhythm: normal sinus rhythm  Interpretation: No acute ST changes. Minimal criteria for inferior infarct are not longer present. No STEMI.             The Emergency Provider reviewed the vital signs and test results, which are outlined above.     ED Discussion     11:02 PM: Re-evaluated the pt. Pt states that she ran out of all her medications like lasix, spironolactone, and lactulose. Pt's leg pain is neuropathy and takes the medication at night but is unsure of the dose. States she also takes ibuprofen. Reports she has been having issues with her health insurance and is in the process of reinstating the care with her PCP. Medications will be refilled.     11:07 PM: Reassessed pt at this time. Discussed with patient and/or family/caretaker all pertinent ED information and results. Discussed pt dx and plan of tx. Gave the patient and family all f/u and return to the ED instructions. All questions and concerns were addressed at this time. Patient and/or family/caretaker expresses understanding of information and instructions, and is comfortable with plan to discharge. Pt is stable for discharge.     I discussed with patient and/or family/caretaker that evaluation in the ED does not suggest any emergent or life threatening medical conditions requiring immediate intervention beyond what was provided in the ED, and I believe patient is safe for discharge.  Regardless, an unremarkable evaluation in the ED does not preclude the development or presence of a serious of life threatening condition. As such, I instructed that the patient is to return immediately for any worsening or change in current symptoms.       Medical Decision Making  Patient is a 39-year-old female with cirrhosis who presents to the emergency department with multiple complaints.  She has run out of multiple medications.  Her ammonia levels elevated, but patient is GCS 15 and no hospitalization  indication from hepatic encephalopathy standpoint.  Will restart her lactulose.  One of patient's many complaints was vomiting with small amount of red blood in it.  No vomiting here in the emergency department.  Hemoglobin is stable.  Will restart all of patient's medications that she is currently out of, and advised close follow up with the primary care physician.  ER return precautions provided    Amount and/or Complexity of Data Reviewed  External Data Reviewed: notes.     Details: Past medical history, medications, allergies reviewed  Labs: ordered. Decision-making details documented in ED Course.  Radiology: ordered and independent interpretation performed. Decision-making details documented in ED Course.  ECG/medicine tests: ordered and independent interpretation performed. Decision-making details documented in ED Course.    Risk  OTC drugs.  Prescription drug management.                ED Medication(s):  Medications   spironolactone tablet 100 mg (has no administration in time range)   gabapentin capsule 300 mg (has no administration in time range)   ibuprofen tablet 400 mg (has no administration in time range)   promethazine (PHENERGAN) 12.5 mg in 0.9% NaCl 50 mL IVPB (0 mg Intravenous Stopped 3/5/25 1834)   lactulose 20 gram/30 mL solution Soln 30 g (30 g Oral Given 3/5/25 2122)       New Prescriptions    No medications on file               Scribe Attestation:   Scribe #1: I performed the above scribed service and the documentation accurately describes the services I performed. I attest to the accuracy of the note.     Attending:   Physician Attestation Statement for Scribe #1: I, Biju Gore MD, personally performed the services described in this documentation, as scribed by Nubia Massey, in my presence, and it is both accurate and complete.           Clinical Impression       ICD-10-CM ICD-9-CM   1. Alcoholic cirrhosis of liver without ascites  K70.30 571.2   2. Chest pain  R07.9 786.50   3.  Encounter for medication refill  Z76.0 V68.1   4. Increased ammonia level  R79.89 790.6       Disposition:   Disposition: Discharged  Condition: Stable        Biju Gore MD  03/16/25 2039

## 2025-03-06 NOTE — ED NOTES
Abdominal Pain: Patient complains of abdominal pain. The pain is described as aching, and is 8/10 in intensity. Pain is located in the LUQ .Symptoms have been gradually worsening since. Aggravating factors: none.  Alleviating factors: rest. Associated symptoms: nausea and vomiting.    
Bed: 23  Expected date:   Expected time:   Means of arrival:   Comments:  Litzy Amato  
HPI     Abdominal Pain     Additional comments: Pt with history of cirrhosis c/o abdominal pain,   abdominal fullness, vomiting blood, constipation, and foggy memory.           Last edited by Tarah Dobson RN on 3/5/2025  2:29 PM.        
neurologic

## 2025-03-11 ENCOUNTER — TELEPHONE (OUTPATIENT)
Dept: HEPATOLOGY | Facility: CLINIC | Age: 40
End: 2025-03-11

## 2025-03-11 ENCOUNTER — OFFICE VISIT (OUTPATIENT)
Dept: HEPATOLOGY | Facility: CLINIC | Age: 40
End: 2025-03-11
Payer: MEDICAID

## 2025-03-11 VITALS
HEART RATE: 79 BPM | RESPIRATION RATE: 18 BRPM | BODY MASS INDEX: 30.46 KG/M2 | SYSTOLIC BLOOD PRESSURE: 118 MMHG | TEMPERATURE: 98 F | WEIGHT: 212.75 LBS | OXYGEN SATURATION: 100 % | HEIGHT: 70 IN | DIASTOLIC BLOOD PRESSURE: 61 MMHG

## 2025-03-11 DIAGNOSIS — G93.40 ENCEPHALOPATHY, UNSPECIFIED TYPE: Primary | ICD-10-CM

## 2025-03-11 PROCEDURE — 99999 PR PBB SHADOW E&M-EST. PATIENT-LVL III: CPT | Mod: PBBFAC,,, | Performed by: INTERNAL MEDICINE

## 2025-03-11 PROCEDURE — 99215 OFFICE O/P EST HI 40 MIN: CPT | Mod: S$PBB,,, | Performed by: INTERNAL MEDICINE

## 2025-03-11 PROCEDURE — 1159F MED LIST DOCD IN RCRD: CPT | Mod: CPTII,,, | Performed by: INTERNAL MEDICINE

## 2025-03-11 PROCEDURE — 3078F DIAST BP <80 MM HG: CPT | Mod: CPTII,,, | Performed by: INTERNAL MEDICINE

## 2025-03-11 PROCEDURE — 1160F RVW MEDS BY RX/DR IN RCRD: CPT | Mod: CPTII,,, | Performed by: INTERNAL MEDICINE

## 2025-03-11 PROCEDURE — 99213 OFFICE O/P EST LOW 20 MIN: CPT | Mod: PBBFAC | Performed by: INTERNAL MEDICINE

## 2025-03-11 PROCEDURE — 3074F SYST BP LT 130 MM HG: CPT | Mod: CPTII,,, | Performed by: INTERNAL MEDICINE

## 2025-03-11 PROCEDURE — 3044F HG A1C LEVEL LT 7.0%: CPT | Mod: CPTII,,, | Performed by: INTERNAL MEDICINE

## 2025-03-11 PROCEDURE — 3008F BODY MASS INDEX DOCD: CPT | Mod: CPTII,,, | Performed by: INTERNAL MEDICINE

## 2025-03-11 RX ORDER — LANOLIN ALCOHOL/MO/W.PET/CERES
1 CREAM (GRAM) TOPICAL EVERY MORNING
COMMUNITY
Start: 2025-01-17

## 2025-03-11 RX ORDER — LAMOTRIGINE 25 MG/1
TABLET ORAL
COMMUNITY
Start: 2025-03-10

## 2025-03-11 RX ORDER — IBUPROFEN 200 MG
TABLET ORAL
COMMUNITY

## 2025-03-11 RX ORDER — MAGNESIUM GLUCONATE 30 MG(550)
250 TABLET ORAL
COMMUNITY

## 2025-03-11 NOTE — Clinical Note
Plan: -  Please get insurance approval to start liver transplant evaluation.  -  No More Alcohol, ever ever.  Patient agrees.  -  Labs every 3 months:  CBC, CMP, PT INR, next due June 2025.  -  Ultrasound of abdomen and AFP every 6 months -  Paracentesis standing order placed, with cell count and diff.  -  Low salt in the diet, avoid canned, bottled and processed foods. -  Continue current meds -  Avoid alcohol, smoking, sedatives and meds with codeine. -  Avoid high intake of Tylenol (more than 4 extra-strength pills in one day) -  Call us if any bleeding, fevers, confusion, disorientation occur -  Endoscopy: past EGD at [facility]. - follow up with same doctor. -  Transplant option discussed, will evaluate if liver shows no improvement, labs today.

## 2025-03-11 NOTE — PATIENT INSTRUCTIONS
Plan:  -  Please get insurance approval to start liver transplant evaluation.   -  No More Alcohol, ever ever.  Patient agrees.   -  Labs every 3 months:  CBC, CMP, PT INR, next due June 2025.   -  Ultrasound of abdomen and AFP every 6 months  -  Paracentesis standing order placed, with cell count and diff.   -  Low salt in the diet, avoid canned, bottled and processed foods.  -  Continue current meds  -  Avoid alcohol, smoking, sedatives and meds with codeine.  -  Avoid high intake of Tylenol (more than 4 extra-strength pills in one day)  -  Call us if any bleeding, fevers, confusion, disorientation occur  -  Endoscopy: past EGD at [facility]. - follow up with same doctor.  -  Transplant option discussed, will evaluate if liver shows no improvement, labs today.

## 2025-03-11 NOTE — TELEPHONE ENCOUNTER
----- Message from Magalis Min MD sent at 3/11/2025  2:57 PM CDT -----   Plan:  -  Please get insurance approval to start liver transplant evaluation.   -  No More Alcohol, ever ever.  Patient agrees.   -  Labs every 3 months:  CBC, CMP, PT INR, next due June 2025.   -  Ultrasound of abdomen and AFP every 6 months  -  Paracentesis standing order placed, with cell count and diff.   -  Low salt in the diet, avoid canned, bottled and processed foods.  -  Continue current meds  -  Avoid alcohol, smoking, sedatives and meds with codeine.  -  Avoid high intake of Tylenol (more than 4 extra-strength pills in one day)  -  Call us if any bleeding, fevers, confusion, disorientation occur  -  Endoscopy: past EGD at [facility]. - follow up with same doctor.  -  Transplant option discussed, will evaluate if liver shows no improvement, labs today.

## 2025-03-11 NOTE — TELEPHONE ENCOUNTER
Pt was seen by Dr. Min today. Pt was scheduled for labs and us by check out person.    Wound Care (No Sutures): Petrolatum

## 2025-03-11 NOTE — PROGRESS NOTES
HEPATOLOGY FOLLOW UP    Litzy Amato is here for follow up of No chief complaint on file.      HPI  39 y.o. female with No chief complaint on file.  Cirrhosis, ascites, muscle cramps.    Patient admitted 9/25/23 and discharged 10/2/23, here for post-hospital discharge  follow-up. She was told she has cirrhosis.      Has a history of Alcohol-related liver disease, gastric sleeve surgery, one episode of SBP, one paracentesis (6 L) with albumin infusion, 2 uPRBCs for anemia, eso varices, nonbleeding.  She was discharged but redeveloped abd distention, had some hematochezia, and intermittent melena, was noted to be hypotensive, needing readmission on 9/25/23.  Para repeated (3 L), no SBP found this time. Hgb 6.6, was given I U PRBCs, CT A/P findings of cirrhosis with portal hypertension, anasarca.    Today 3/11/25:    AMANDA positive 1:320 and IgG 2274,     AMA neg, ASMA neg.     Ultrasound abd limited: 3/5/25  Configuration of the liver capsule consistent with cirrhosis. No focal parenchymal abnormalities are identified. Small dependent gallstones in the gallbladder lumen. No secondary findings indicating acute cholecystitis.    CT abd 9/30/2023:   Liver: Normal in size, although decreased when compared to CT 2021.  Nodular contour.  1.3 cm hypoattenuating right hepatic lobe lesion favored to represent simple cyst.     Gallbladder: Layering hyperdense material suggestive of biliary sludge versus microlithiasis.  Evaluation for pericholecystic fluid obscured due to surrounding ascites.     Bile Ducts: No significant intrahepatic or extrahepatic biliary ductal dilatation.     Pancreas: No mass or peripancreatic fat stranding.     Spleen: Spleen is enlarged.  Normal attenuation.     Adrenals: No significant abnormalities.     Kidneys/Ureters: Normal in size and location.  Mild perinephric stranding.  Normal enhancement. No hydronephrosis or nephrolithiasis.  Difficult visualization of the distal ureters.  Proximal  and mid ureters are normal in caliber.  No developing collection or mass within the retroperitoneal space.     Bladder: Nondistended and incompletely evaluated.     Reproductive organs: Uterus and bilateral adnexa are present and unremarkable.     Peritoneum: Large volume ascitic fluid.  No intraperitoneal free air.     Retroperitoneum: Scattered prominent mesenteric lymph nodes without pathologic enlargement.  Mesenteric edema, nonspecific.     Bowel/Mesentery: Postsurgical changes of the stomach from sleeve gastrectomy..  Small and large bowel are normal in caliber with no evidence of obstruction or inflammation. Appendix is visualized and unremarkable.     Abdominal wall:  Diffuse body wall edema, most pronounced within the left abdominal wall.  Edematous changes of the underlying abdominal wall musculature.  No organized fluid collection.        8/20/24:  Patient states she is doing well.    Microcytic, hypochromic Anemia, will give iron supplement to see if she responds.      MELD 3.0: 8 at 3/5/2025  4:49 PM  MELD-Na: 7 at 3/5/2025  4:49 PM  Calculated from:  Serum Creatinine: 0.9 mg/dL (Using min of 1 mg/dL) at 3/5/2025  4:49 PM  Serum Sodium: 139 mmol/L (Using max of 137 mmol/L) at 3/5/2025  4:49 PM  Total Bilirubin: 0.7 mg/dL (Using min of 1 mg/dL) at 3/5/2025  4:49 PM  Serum Albumin: 4 g/dL (Using max of 3.5 g/dL) at 3/5/2025  4:49 PM  INR(ratio): 1.1 at 3/5/2025  4:49 PM  Age at listing (hypothetical): 39 years  Sex: Female at 3/5/2025  4:49 PM        HCC surveillance:   Last U/s showed no focal lesion in the liver.  AFP 2.7.     Will continue surveillance every 6 months, next in Feb 2025.      5/14/24:    -  Stopped alcohol on 9/2/2023.  It is now approx 8 months of abstinence.  Eats healthy, and not drinking.      -  C/o muscle cramps.  Not sleeping well.  Has 4-5 hours of sleep.   Had 5 paracenteses, initial two were at [facility], last one was 4.5 : removed on 2/27/24 at [facility].       -  She has  seen a psychiatrist, but hasn't seen one, she is paying out of her pocket, because she has not received call back from the places she hs tried to get an appointment.  She has medicaid.      Labs: pancytopenia, INR 1.3, albumin improved.    MELD 3.0: 8 at 3/5/2025  4:49 PM  MELD-Na: 7 at 3/5/2025  4:49 PM  Calculated from:  Serum Creatinine: 0.9 mg/dL (Using min of 1 mg/dL) at 3/5/2025  4:49 PM  Serum Sodium: 139 mmol/L (Using max of 137 mmol/L) at 3/5/2025  4:49 PM  Total Bilirubin: 0.7 mg/dL (Using min of 1 mg/dL) at 3/5/2025  4:49 PM  Serum Albumin: 4 g/dL (Using max of 3.5 g/dL) at 3/5/2025  4:49 PM  INR(ratio): 1.1 at 3/5/2025  4:49 PM  Age at listing (hypothetical): 39 years  Sex: Female at 3/5/2025  4:49 PM      I have told her of the possibility of a living donor.        12/19/24:  Lost a lot of wt, 73 lb in 3 months.  Edema in the lower extremities is gone.  She feels abd is still distended, but exam of abdomen reveals thick and wall.      Has not touched alcohol since 9/2/2024.  Prior to that she had quit drinking but after her 's death on 6/5/23, from a heart attack, aneurysm, she started drinking again.  Has no children.   Has a pretty big family, but lost 5 family members in 6 months (April to Sept 2024).  3 of the family members had CA (pancreatic, liver, grandPa CA unknown).    Discharge Meds include diuretics lasix 40 and spironolactone 100 daily, lactulose, pantoprazole, miralax,.            Outpatient Encounter Medications as of 3/11/2025   Medication Sig Dispense Refill    docusate sodium (COLACE ORAL) Take 100 mg by mouth.      ELDERBERRY FRUIT ORAL Take by mouth.      ferrous gluconate (FERGON) 324 MG tablet Take 1 tablet (324 mg total) by mouth 2 (two) times daily with meals. 60 tablet 2    furosemide (LASIX) 40 MG tablet TAKE 1 TABLET BY MOUTH ONCE DAILY. 90 tablet 3    furosemide (LASIX) 40 MG tablet Take 1 tablet (40 mg total) by mouth once daily. 20 tablet 0    gabapentin (NEURONTIN)  300 MG capsule Take 1 capsule (300 mg total) by mouth 3 (three) times daily as needed (neuropathy pain). 60 capsule 0    lactulose (CEPHULAC) 20 gram Pack Take 1 packet (20 g total) by mouth 3 (three) times daily. 60 packet 0    lactulose (CHRONULAC) 10 gram/15 mL solution Take 23 mLs (15 g total) by mouth 3 (three) times daily. 300 mL 0    omega-3 fatty acids/fish oil (FISH OIL-OMEGA-3 FATTY ACIDS) 300-1,000 mg capsule Take 500 mg by mouth.      paliperidone (INVEGA) 3 MG TR24 6 MG  .      pantoprazole (PROTONIX) 40 MG tablet Take 1 tablet (40 mg total) by mouth once daily. 30 tablet 0    PNV cmb#95-ferrous fumarate-FA (PRENATAL MULTIVITAMINS) 28 mg iron- 800 mcg Tab Take by mouth.      polyethylene glycol (GLYCOLAX) 17 gram/dose powder Use cap to measure out (17 g) mix with a liquid and take by mouth daily as needed (constipation). 510 g 0    spironolactone (ALDACTONE) 100 MG tablet Take 1 tablet (100 mg total) by mouth once daily. 90 tablet 3    spironolactone (ALDACTONE) 100 MG tablet Take 1 tablet (100 mg total) by mouth once daily. 20 tablet 0    traZODone (DESYREL) 50 MG tablet 50 mg.       No facility-administered encounter medications on file as of 3/11/2025.     Review of patient's allergies indicates:   Allergen Reactions    Benadryl [diphenhydramine hcl]     Ondansetron      Past Medical History:   Diagnosis Date    Unspecified cirrhosis of liver      No past surgical history on file.  Social History     Socioeconomic History    Marital status:    Tobacco Use    Smoking status: Every Day     Types: Vaping with nicotine    Smokeless tobacco: Never   Substance and Sexual Activity    Drug use: Never     Social Drivers of Health     Financial Resource Strain: Patient Unable To Answer (1/11/2025)    [facility] from [facility] and Its Subsidiaries and Affiliates    Overall Financial Resource Strain (CARDIA)     Difficulty of Paying Living Expenses: Patient unable to  answer   Food Insecurity: Patient Unable To Answer (1/11/2025)    Received from [facility] and Its Subsidiaries and Affiliates    Hunger Vital Sign     Worried About Running Out of Food in the Last Year: Patient unable to answer     Ran Out of Food in the Last Year: Patient unable to answer   Transportation Needs: Patient Unable To Answer (1/11/2025)    Received from [facility] and Its Subsidiaries and Affiliates    PRAPARE - Transportation     Lack of Transportation (Medical): Patient unable to answer     Lack of Transportation (Non-Medical): Patient unable to answer   Physical Activity: Inactive (1/14/2024)    Exercise Vital Sign     Days of Exercise per Week: 0 days     Minutes of Exercise per Session: 0 min   Stress: Stress Concern Present (1/14/2024)    [employer] [place] [employer] - Occupational Stress Questionnaire     Feeling of Stress : Rather much   Housing Stability: Patient Unable To Answer (1/11/2025)    [facility]    Housing Stability Vital Sign     Unable to Pay for Housing in the Last Year: Patient unable to answer     Number of Times Moved in the Last Year: 0     Homeless in the Last Year: Patient unable to answer       ROS  Gen- no wt loss, gain, fever, chills  HEENT - no congestion, nosebleed, visual or hearing problems  Chest - no cough, shortness of breath, chest pain  Cardiovascular- no chest pain, leg swelling, dyspnea on exertion or at rest, orthopnea  GI- no abdominal pain, nausea, vomiting, constipation, or diarrhea   Musculoskeletal:  no pain or swelling of joints  Neuro - no headaches, dizziness, weakness, tingling numbness in hands or feet,  Skin- no rash, itching  Psych - no depression, anxiety, sleep disturbance, memory loss    There were no vitals filed for this visit.        Physical Exam:  General  Appearance: Well appearing in no acute distress  Head:   Normocephalic, without obvious abnormality  Eyes:    No scleral icterus, EOMI  ENT: Neck supple, Lips, mucosa, and tongue normal; teeth and gums normal  Lungs: CTA bilaterally in anterior and posterior fields, no wheezes, no crackles.  Heart:  Regular rate and rhythm, S1, S2 normal, no murmurs heard  Abdomen: Soft, non tender, non distended with positive bowel sounds in all four quadrants. No hepatosplenomegaly, ascites, or mass  Extremities: 2+ pulses, no clubbing, cyanosis or edema  Skin: No rash  Neurologic: CN II-XII intact, alert, oriented x 3, no asterixis.     Lab Results   Component Value Date    GLU 68 (L) 03/05/2025    BUN 12 03/05/2025    CREATININE 0.9 03/05/2025    CALCIUM 9.2 03/05/2025     03/05/2025    K 3.9 03/05/2025     03/05/2025    PROT 8.3 03/05/2025    CO2 21 (L) 03/05/2025    ANIONGAP 11 03/05/2025    WBC 3.11 (L) 03/05/2025    RBC 4.19 03/05/2025    HGB 11.2 (L) 03/05/2025    HCT 36.2 (L) 03/05/2025    MCV 86 03/05/2025    MCH 26.7 (L) 03/05/2025    MCHC 30.9 (L) 03/05/2025     Lab Results   Component Value Date    RDW 19.6 (H) 03/05/2025    PLT 96 (L) 03/05/2025    MPV 10.2 03/05/2025    GRAN 2.1 03/05/2025    GRAN 67.2 03/05/2025    LYMPH 0.7 (L) 03/05/2025    LYMPH 22.2 03/05/2025    MONO 0.3 03/05/2025    MONO 9.0 03/05/2025    EOSINOPHIL 1.0 03/05/2025    BASOPHIL 0.6 03/05/2025    EOS 0.0 03/05/2025    BASO 0.02 03/05/2025    APTT 27.1 03/05/2025    GROUPTRH O NEG 09/28/2023     (H) 09/25/2023    CHOL 98 01/13/2025    TRIG 61 01/13/2025    HDL 16 (L) 01/13/2025    ALBUMIN 4.0 03/05/2025    BILIDIR 2.4 (H) 09/27/2023    AST 47 (H) 03/05/2025    ALT 28 03/05/2025    ALKPHOS 53 03/05/2025    MG 1.7 12/27/2023    LABPROT 12.8 (H) 03/05/2025    INR 1.1 03/05/2025       Diagnostics:     Assessment   No diagnosis found.    Alcoholic cirrhosis, ascites has improved, edema was gone, lost 73 lb in 3 months.    Abstinent  since 9/2/23.   I have told her Never to drink alcohol again.     Interval history: 3/11/25  Had mental issues, with hallucinations, hearing things, paranoia, panic attacks. She could not get lactulose script filled.  Admitted twice, for bipolar patient, meds were given but they made her sicker than before meds, she gained weight.  Include lamictal, Invega,             -  Eats healthy, and not drinking.    -  C/o muscle cramps.  Not sleeping well.  Has 4-5 hours of sleep.     -  Had 5 paracenteses, initial two were at [facility], last one was 4.5 : removed on 2/27/24 at [facility].     -  She has seen a psychiatrist, but in the last month, hasn't seen one, she is paying out of her pocket, because she has not received a call back from the places she has tried to get an appointment.  She has medicaid.      Labs: pancytopenia, INR 1.3, albumin improved.    MELD 3.0: 13 at 4/29/2024 10:46 AM  MELD-Na: 11 at 4/29/2024 10:46 AM  Calculated from:  Serum Creatinine: 0.9 mg/dL (Using min of 1 mg/dL) at 4/29/2024 10:46 AM  Serum Sodium: 136 mmol/L at 4/29/2024 10:46 AM  Total Bilirubin: 1.5 mg/dL at 4/29/2024 10:46 AM  Serum Albumin: 3.1 g/dL at 4/29/2024 10:46 AM  INR(ratio): 1.3 at 4/29/2024 10:46 AM  Age at listing (hypothetical): 38 years  Sex: Female at 4/29/2024 10:46 AM      I have told her of the possibility of a living donor.      -  Last u/s 1/31/2024:  No detrimental change with liver cirrhosis and portal venous hypertension sequela.  Small volume ascites.   Gallbladder sludge with borderline wall thickening in felt to be related to liver disease.  Needs future HCC surveillance, next due end of July 31, 2024.      Will start liver transplant evaluation (patient with encephalopathy, mental issues, unable to work due to pain, foggy brain, mood swings, depression, fatigue, headaches, unable to work).      MELD 3.0: 8 at 3/5/2025  4:49 PM  MELD-Na: 7 at 3/5/2025  4:49 PM  Calculated from:  Serum Creatinine: 0.9  mg/dL (Using min of 1 mg/dL) at 3/5/2025  4:49 PM  Serum Sodium: 139 mmol/L (Using max of 137 mmol/L) at 3/5/2025  4:49 PM  Total Bilirubin: 0.7 mg/dL (Using min of 1 mg/dL) at 3/5/2025  4:49 PM  Serum Albumin: 4 g/dL (Using max of 3.5 g/dL) at 3/5/2025  4:49 PM  INR(ratio): 1.1 at 3/5/2025  4:49 PM  Age at listing (hypothetical): 39 years  Sex: Female at 3/5/2025  4:49 PM        Plan:  -  Please get insurance approval to start liver transplant evaluation.   -  No More Alcohol, ever ever.  Patient agrees.   -  Labs every 3 months:  CBC, CMP, PT INR, next due June 2025.   -  Ultrasound of abdomen and AFP every 6 months, next due end of November 2024   -  Paracentesis standing order placed, with cell count and diff.   -  Low salt in the diet, avoid canned, bottled and processed foods.  -  Continue current meds  -  Avoid alcohol, smoking, sedatives and meds with codeine.  -  Avoid high intake of Tylenol (more than 4 extra-strength pills in one day)  -  Call us if any bleeding, fevers, confusion, disorientation occur  -  Endoscopy: past EGD at [facility]. - follow up with same doctor.  -  Transplant option discussed, will evaluate if liver shows no improvement, labs today.   -  Return in 2 months.     Magalis Min MD  Hepatology, [facility]

## 2025-03-13 ENCOUNTER — TELEPHONE (OUTPATIENT)
Dept: TRANSPLANT | Facility: CLINIC | Age: 40
End: 2025-03-13
Payer: MEDICAID

## 2025-03-13 NOTE — TELEPHONE ENCOUNTER
----- Message from Yimi Chang sent at 3/11/2025  4:10 PM CDT -----    ----- Message -----  From: Magalis Min MD  Sent: 3/11/2025   2:57 PM CDT  To: Pako Blancas Staff; [facility] Pre-Liver Transplan#     Plan:  -  Please get insurance approval to start liver transplant evaluation.   -  No More Alcohol, ever ever.  Patient agrees.   -  Labs every 3 months:  CBC, CMP, PT INR, next due June 2025.   -  Ultrasound of abdomen and AFP every 6 months  -  Paracentesis standing order placed, with cell count and diff.   -  Low salt in the diet, avoid canned, bottled and processed foods.  -  Continue current meds  -  Avoid alcohol, smoking, sedatives and meds with codeine.  -  Avoid high intake of Tylenol (more than 4 extra-strength pills in one day)  -  Call us if any bleeding, fevers, confusion, disorientation occur  -  Endoscopy: past EGD at [facility]. - follow up with same doctor.  -  Transplant option discussed, will evaluate if liver shows no improvement, labs today.

## 2025-03-19 ENCOUNTER — TELEPHONE (OUTPATIENT)
Dept: TRANSPLANT | Facility: CLINIC | Age: 40
End: 2025-03-19
Payer: MEDICAID

## 2025-03-19 NOTE — TELEPHONE ENCOUNTER
----- Message from Alicia Silva sent at 3/14/2025  2:00 PM CDT -----  Regarding: New eval per Dr. Min    ----- Message -----  From: Magalis Min MD  Sent: 3/11/2025   2:57 PM CDT  To: Pako Blancas Staff; [facility] Pre-Liver Transplan#     Plan:  -  Please get insurance approval to start liver transplant evaluation.   -  No More Alcohol, ever ever.  Patient agrees.   -  Labs every 3 months:  CBC, CMP, PT INR, next due June 2025.   -  Ultrasound of abdomen and AFP every 6 months  -  Paracentesis standing order placed, with cell count and diff.   -  Low salt in the diet, avoid canned, bottled and processed foods.  -  Continue current meds  -  Avoid alcohol, smoking, sedatives and meds with codeine.  -  Avoid high intake of Tylenol (more than 4 extra-strength pills in one day)  -  Call us if any bleeding, fevers, confusion, disorientation occur  -  Endoscopy: past EGD at [facility]. - follow up with same doctor.  -  Transplant option discussed, will evaluate if liver shows no improvement, labs today.

## 2025-03-19 NOTE — TELEPHONE ENCOUNTER
Referral received from Dr Ct Min  Initial  referral from SELF REFERRAL   Patient with  alcohol cirrhosis MELD  8  ICD-10:  K70.30  Referred for liver transplant for CONSULT / EVALUATION.    Referral completed and forwarded to [facility].      Patient has encephalopathy which, along with her psych issues is very difficult to her and she cannot do her job, she has or will have to quit working, so even if meld is low, she has problems that could get better with transplant. she will look for living donor.     Insurance: EPIC  PRIMARY:   ID:  Contact #     SECONDARY:   ID:  Contact #

## 2025-03-26 ENCOUNTER — TELEPHONE (OUTPATIENT)
Dept: TRANSPLANT | Facility: CLINIC | Age: 40
End: 2025-03-26
Payer: MEDICAID

## 2025-03-26 NOTE — TELEPHONE ENCOUNTER
Pt called x 2. Requested call back for liver txp evaluation appts. Also sent message to pt portal requesting call back.

## 2025-03-26 NOTE — TELEPHONE ENCOUNTER
PT called, spoke with Pt. Pt informed of insurance clearance for liver transplant evaluation.PT made aware pt will be assigned to Josué Nails RN, Liver Transplant Coordinator. She will call to schedule appts needed to complete transplant evaluation.  PT made aware that the support person must be in attendance at the evaluation.   Transplant Department phone number provided to the patient. Time for questions, all questions answered.

## 2025-03-31 ENCOUNTER — TELEPHONE (OUTPATIENT)
Dept: TRANSPLANT | Facility: CLINIC | Age: 40
End: 2025-03-31
Payer: MEDICAID

## 2025-03-31 NOTE — TELEPHONE ENCOUNTER
Discussed liver transplant evaluation. Answered all questions.Wants to talk to friend who will fly in to determine what dates work best for her. Ms. Amato will call me back to determine which dates they will pick for fast pass.

## 2025-04-01 ENCOUNTER — HOSPITAL ENCOUNTER (EMERGENCY)
Facility: HOSPITAL | Age: 40
Discharge: HOME | End: 2025-04-02
Attending: EMERGENCY MEDICINE
Payer: MEDICAID

## 2025-04-01 DIAGNOSIS — R10.9 ABDOMINAL PAIN, UNSPECIFIED ABDOMINAL LOCATION: Primary | ICD-10-CM

## 2025-04-01 LAB
ABSOLUTE EOSINOPHIL (OHS): 0.02 K/UL
ABSOLUTE MONOCYTE (OHS): 0.52 K/UL (ref 0.3–1)
ABSOLUTE NEUTROPHIL COUNT (OHS): 4.16 K/UL (ref 1.8–7.7)
ALBUMIN SERPL BCP-MCNC: 4.2 G/DL (ref 3.5–5.2)
ALP SERPL-CCNC: 62 UNIT/L (ref 40–150)
ALT SERPL W/O P-5'-P-CCNC: 21 UNIT/L (ref 10–44)
ANION GAP (OHS): 12 MMOL/L (ref 8–16)
AST SERPL-CCNC: 38 UNIT/L (ref 11–45)
BASOPHILS # BLD AUTO: 0.03 K/UL
BASOPHILS NFR BLD AUTO: 0.5 %
BILIRUB SERPL-MCNC: 0.8 MG/DL (ref 0.1–1)
BUN SERPL-MCNC: 13 MG/DL (ref 6–20)
CALCIUM SERPL-MCNC: 10 MG/DL (ref 8.7–10.5)
CHLORIDE SERPL-SCNC: 102 MMOL/L (ref 95–110)
CO2 SERPL-SCNC: 24 MMOL/L (ref 23–29)
CREAT SERPL-MCNC: 1 MG/DL (ref 0.5–1.4)
ERYTHROCYTE [DISTWIDTH] IN BLOOD BY AUTOMATED COUNT: 18.7 % (ref 11.5–14.5)
GFR SERPLBLD CREATININE-BSD FMLA CKD-EPI: >60 ML/MIN/1.73/M2
GLUCOSE SERPL-MCNC: 116 MG/DL (ref 70–110)
HCT VFR BLD AUTO: 38.3 % (ref 37–48.5)
HGB BLD-MCNC: 12.2 GM/DL (ref 12–16)
IMM GRANULOCYTES # BLD AUTO: 0.02 K/UL (ref 0–0.04)
IMM GRANULOCYTES NFR BLD AUTO: 0.3 % (ref 0–0.5)
INR PPP: 1.2 (ref 0.8–1.2)
LIPASE SERPL-CCNC: 68 U/L (ref 4–60)
LYMPHOCYTES # BLD AUTO: 1.03 K/UL (ref 1–4.8)
MCH RBC QN AUTO: 26.7 PG (ref 27–31)
MCHC RBC AUTO-ENTMCNC: 31.9 G/DL (ref 32–36)
MCV RBC AUTO: 84 FL (ref 82–98)
NUCLEATED RBC (/100WBC) (OHS): 0 /100 WBC
PLATELET # BLD AUTO: 106 K/UL (ref 150–450)
PMV BLD AUTO: 10.4 FL (ref 9.2–12.9)
POTASSIUM SERPL-SCNC: 3.5 MMOL/L (ref 3.5–5.1)
PROT SERPL-MCNC: 9.1 GM/DL (ref 6–8.4)
PROTHROMBIN TIME: 13 SECONDS (ref 9–12.5)
RBC # BLD AUTO: 4.57 M/UL (ref 4–5.4)
RELATIVE EOSINOPHIL (OHS): 0.3 %
RELATIVE LYMPHOCYTE (OHS): 17.8 % (ref 18–48)
RELATIVE MONOCYTE (OHS): 9 % (ref 4–15)
RELATIVE NEUTROPHIL (OHS): 72.1 % (ref 38–73)
SODIUM SERPL-SCNC: 138 MMOL/L (ref 136–145)
WBC # BLD AUTO: 5.78 K/UL (ref 3.9–12.7)

## 2025-04-01 PROCEDURE — 83690 ASSAY OF LIPASE: CPT | Mod: NTX | Performed by: NURSE PRACTITIONER

## 2025-04-01 PROCEDURE — 99283 EMERGENCY DEPT VISIT LOW MDM: CPT | Mod: NTX

## 2025-04-01 PROCEDURE — 80053 COMPREHEN METABOLIC PANEL: CPT | Mod: NTX | Performed by: NURSE PRACTITIONER

## 2025-04-01 PROCEDURE — 85025 COMPLETE CBC W/AUTO DIFF WBC: CPT | Mod: NTX | Performed by: NURSE PRACTITIONER

## 2025-04-01 PROCEDURE — 85610 PROTHROMBIN TIME: CPT | Mod: NTX | Performed by: NURSE PRACTITIONER

## 2025-04-02 VITALS
HEART RATE: 74 BPM | TEMPERATURE: 98 F | SYSTOLIC BLOOD PRESSURE: 124 MMHG | WEIGHT: 212 LBS | HEIGHT: 70 IN | BODY MASS INDEX: 30.35 KG/M2 | DIASTOLIC BLOOD PRESSURE: 75 MMHG | OXYGEN SATURATION: 97 % | RESPIRATION RATE: 17 BRPM

## 2025-04-02 LAB
AMMONIA PLAS-SCNC: 46 UMOL/L (ref 10–50)
BILIRUB UR QL STRIP.AUTO: NEGATIVE
CLARITY UR: CLEAR
COLOR UR AUTO: YELLOW
GLUCOSE UR QL STRIP: NEGATIVE
HGB UR QL STRIP: NEGATIVE
KETONES UR QL STRIP: NEGATIVE
LEUKOCYTE ESTERASE UR QL STRIP: ABNORMAL
MICROSCOPIC COMMENT: ABNORMAL
NITRITE UR QL STRIP: NEGATIVE
PH UR STRIP: 6 [PH]
PROT UR QL STRIP: ABNORMAL
RBC #/AREA URNS AUTO: 1 /HPF (ref 0–4)
SP GR UR STRIP: 1.02
SQUAMOUS #/AREA URNS AUTO: 2 /HPF
UROBILINOGEN UR STRIP-ACNC: NEGATIVE EU/DL
WBC #/AREA URNS AUTO: 6 /HPF (ref 0–5)

## 2025-04-02 PROCEDURE — 82140 ASSAY OF AMMONIA: CPT | Mod: NTX | Performed by: PHYSICIAN ASSISTANT

## 2025-04-02 PROCEDURE — 81003 URINALYSIS AUTO W/O SCOPE: CPT | Mod: NTX | Performed by: NURSE PRACTITIONER

## 2025-04-02 NOTE — ED PROVIDER NOTES
Encounter Date: 4/1/2025       History     Chief Complaint   Patient presents with    Abdominal Pain     History of cirrhosis. Also not acting herself per family.     39-year-old female presents to the ER with a chief complaint of fatigue and malaise.  Patient has a past medical history of of cirrhosis secondary to Alcohol-related liver disease, gastric sleeve surgery, and bipolar disorder.  Patient followed by hepatology, Dr. Min, recently referred for liver transplant evaluation.  Meld most recently 8.   Presents tonight with chief complaints of fatigue malaise and weakness.  Reports compliance with medications but has not had lactulose for the past 4 days secondary to being at work.    Her vital signs are reassuring.  She does not appear to be toxic, ill or distressed.  She is awake alert and oriented.  Denies any significant abdominal pain or chest pain.  No nausea or vomiting.  Reports chronic abdominal pain and chronic back pain.  She does get regular paracentesis, once every 5 weeks, during her last visit she did not have any fluid removed.      Review of patient's allergies indicates:   Allergen Reactions    Benadryl [diphenhydramine hcl]     Ondansetron      Past Medical History:   Diagnosis Date    Unspecified cirrhosis of liver      History reviewed. No pertinent surgical history.  No family history on file.  Social History[1]  Review of Systems   Constitutional:  Positive for fatigue. Negative for fever.   HENT:  Negative for sore throat.    Respiratory:  Negative for shortness of breath.    Cardiovascular:  Negative for chest pain.   Gastrointestinal:  Positive for abdominal pain. Negative for nausea.   Genitourinary:  Negative for dysuria.   Musculoskeletal:  Negative for back pain.   Skin:  Negative for rash.   Neurological:  Positive for weakness.   Hematological:  Does not bruise/bleed easily.       Physical Exam     Initial Vitals [04/01/25 2106]   BP Pulse Resp Temp SpO2   (!) 148/81 94 16 98.2  °F (36.8 °C) 98 %      MAP       --         Physical Exam    Constitutional: Vital signs are normal. She appears well-developed and well-nourished.   HENT:   Head: Normocephalic and atraumatic.   Right Ear: Hearing normal.   Left Ear: Hearing normal.   No significant icterus   HEENT assessment is normal   Eyes: Conjunctivae are normal.   Neck:   Normal range of motion of the neck   Cardiovascular:  Normal rate and regular rhythm.           Pulmonary/Chest:   Clear to auscultation   Abdominal: Abdomen is soft. Bowel sounds are normal.   Benign abdomen   Musculoskeletal:         General: Normal range of motion.     Neurological: She is alert and oriented to person, place, and time.   Skin: Skin is warm and intact.   Psychiatric: She has a normal mood and affect. Her speech is normal and behavior is normal. Cognition and memory are normal.       ED Course   Procedures  Labs Reviewed   COMPREHENSIVE METABOLIC PANEL - Abnormal       Result Value    Sodium 138      Potassium 3.5      Chloride 102      CO2 24      Glucose 116 (*)     BUN 13      Creatinine 1.0      Calcium 10.0      Protein Total 9.1 (*)     Albumin 4.2      Bilirubin Total 0.8      ALP 62      AST 38      ALT 21      Anion Gap 12      eGFR >60     LIPASE - Abnormal    Lipase Level 68 (*)    URINALYSIS, REFLEX TO URINE CULTURE - Abnormal    Color, UA Yellow      Appearance, UA Clear      pH, UA 6.0      Spec Grav UA 1.025      Protein, UA Trace (*)     Glucose, UA Negative      Ketones, UA Negative      Bilirubin, UA Negative      Blood, UA Negative      Nitrites, UA Negative      Urobilinogen, UA Negative      Leukocyte Esterase, UA 1+ (*)    CBC WITH DIFFERENTIAL - Abnormal    WBC 5.78      RBC 4.57      HGB 12.2      HCT 38.3      MCV 84      MCH 26.7 (*)     MCHC 31.9 (*)     RDW 18.7 (*)     Platelet Count 106 (*)     MPV 10.4      Nucleated RBC 0      Neut % 72.1      Lymph % 17.8 (*)     Mono % 9.0      Eos % 0.3      Basophil % 0.5      Imm Grans %  0.3      Neut # 4.16      Lymph # 1.03      Mono # 0.52      Eos # 0.02      Baso # 0.03      Imm Grans # 0.02     PROTIME-INR - Abnormal    PT 13.0 (*)     INR 1.2     URINALYSIS MICROSCOPIC - Abnormal    RBC, UA 1      WBC, UA 6 (*)     Squamous Epithelial Cells, UA 2      Microscopic Comment       AMMONIA - Normal    Ammonia 46     CBC W/ AUTO DIFFERENTIAL    Narrative:     The following orders were created for panel order CBC auto differential.  Procedure                               Abnormality         Status                     ---------                               -----------         ------                     CBC with Differential[[id number]]       Abnormal            Final result                 Please view results for these tests on the individual orders.          Imaging Results    None          Medications - No data to display  Medical Decision Making  39-year-old female presenting to the ER for evaluation of fatigue, weakness and malaise   Differential electrolyte derangement, cirrhosis, encephalopathy, bipolar disorder, anemia, UTI    Plan:   Routine labs, reassessment      3:00 a.m. assessment, no acute findings on workup.  No evidence of encephalopathy.  CBC and CMP are reassuring.  Patient's mental status has remained normal, vital signs normal.  Do not suspect SBP.  Patient does not need a therapeutic tap.    Patient is stable for outpatient management and discharge.  Recommended follow-up with her hepatology team                                      Clinical Impression:  Final diagnoses:  [R10.9] Abdominal pain, unspecified abdominal location (Primary)          ED Disposition Condition    Discharge Stable          ED Prescriptions    None       Follow-up Information    None            [1]   Social History  Tobacco Use    Smoking status: Every Day     Types: Vaping with nicotine    Smokeless tobacco: Never   Substance Use Topics    Drug use: Never        Dave Toure PA-C  04/02/25  0749

## 2025-04-02 NOTE — DISCHARGE INSTRUCTIONS

## 2025-04-02 NOTE — ED NOTES
"Pt arrives with family member who says she has been altered. Hx liver cirrhosis. Pt states, "I haven't taken my lactulose for four days because I had to work."  "

## 2025-04-02 NOTE — FIRST PROVIDER EVALUATION
Emergency Department TeleTriage Encounter Note      CHIEF COMPLAINT    Chief Complaint   Patient presents with    Abdominal Pain     History of cirrhosis. Also not acting herself per family.       VITAL SIGNS   Initial Vitals [04/01/25 2106]   BP Pulse Resp Temp SpO2   (!) 148/81 94 16 98.2 °F (36.8 °C) 98 %      MAP       --            ALLERGIES    Review of patient's allergies indicates:   Allergen Reactions    Benadryl [diphenhydramine hcl]     Ondansetron        PROVIDER TRIAGE NOTE  Family reports history of liver failure and cirrhosis. States she has had personality changes and confusion. Symptoms began last night. Denies fever.     Limited physical exam via telehealth: The patient is awake, alert, answering questions appropriately and is not in respiratory distress.  As the Teletriage provider, I performed an initial assessment and ordered appropriate labs and imaging studies, if any, to facilitate the patient's care once placed in the ED. Once a room is available, care and a full evaluation will be completed by an alternate ED provider.  Any additional orders and the final disposition will be determined by that provider.  All imaging and labs will not be followed-up by the Teletriage Team, including myself.          ORDERS  Labs Reviewed - No data to display    ED Orders (720h ago, onward)      Start Ordered     Status Ordering Provider    04/01/25 2158 04/01/25 2157  Urinalysis, Reflex to Urine Culture Urine, Clean Catch  STAT         Ordered SAUMYA BRAXTON    04/01/25 2158 04/01/25 2157  Protime-INR  STAT         Ordered SAUMYA BRAXTON    04/01/25 2157 04/01/25 2157  Saline lock IV  Once         Ordered SAUMYA BRAXTON    04/01/25 2157 04/01/25 2157  CBC auto differential  STAT         Ordered SAUMYA BRAXTON    04/01/25 2157 04/01/25 2157  Comprehensive Metabolic Panel  STAT         Ordered SAUMYA BRAXTON    04/01/25 2157 04/01/25 2157  Lipase  STAT         Ordered SAUMYA BRAXTON     04/01/25 2157 04/01/25 2157  Ammonia  Once         Ordered SAUMYA BRAXTON    04/01/25 2157 04/01/25 2157  CBC with Differential  PROCEDURE ONCE         Ordered SAUMYA BRAXTON.              Virtual Visit Note: The provider triage portion of this emergency department evaluation and documentation was performed via [employer], a HIPAA-compliant telemedicine application, in concert with a tele-presenter in the room. A face to face patient evaluation with one of my colleagues will occur once the patient is placed in an emergency department room.      DISCLAIMER: This note was prepared with [employer] voice recognition transcription software. Garbled syntax, mangled pronouns, and other bizarre constructions may be attributed to that software system.

## 2025-04-21 ENCOUNTER — TELEPHONE (OUTPATIENT)
Dept: TRANSPLANT | Facility: CLINIC | Age: 40
End: 2025-04-21
Payer: MEDICAID

## 2025-04-21 NOTE — TELEPHONE ENCOUNTER
----- Message from Marysol sent at 4/21/2025  3:13 PM CDT -----  EGD dated 1/29/2021 rec'm[facility]chell and scanned into media

## 2025-04-22 ENCOUNTER — TELEPHONE (OUTPATIENT)
Dept: TRANSPLANT | Facility: CLINIC | Age: 40
End: 2025-04-22
Payer: MEDICAID

## 2025-04-22 NOTE — TELEPHONE ENCOUNTER
Returned call to Ms. Amato.  She c/o Fever since last Wednesday.101. 3  Vomiting.  Orange urine. Smells horible.  Pain in Flank area.   Night shivering.    I told Ms. Amato that she needs to go to the ED right now for an infectious work-up. She states that she will go right now.

## 2025-05-19 ENCOUNTER — TELEPHONE (OUTPATIENT)
Dept: TRANSPLANT | Facility: CLINIC | Age: 40
End: 2025-05-19
Payer: MEDICAID

## 2025-05-19 NOTE — TELEPHONE ENCOUNTER
----- Message from Marysol sent at 2025  2:59 PM CDT -----  NOTED. No colon done at [facility]. -----------------------------------------------------------------------------------------------------------------------------------Sent By : Bertrand Bowers!2025 1:46:33 PMMark as ReadTalib Gregg,Regarding the above patient, [facility], [facility]. - [facility] does not have colonoscopy records on this patient. The photos were actually of the EGD, not a colonoscopy. They searched by name, , and SS#. Please let me know if there is another facility I should contact for these records.

## 2025-06-10 ENCOUNTER — TELEPHONE (OUTPATIENT)
Dept: TRANSPLANT | Facility: CLINIC | Age: 40
End: 2025-06-10
Payer: MEDICAID

## 2025-06-10 ENCOUNTER — EPISODE CHANGES (OUTPATIENT)
Dept: TRANSPLANT | Facility: CLINIC | Age: 40
End: 2025-06-10

## 2025-06-10 NOTE — TELEPHONE ENCOUNTER
FP July 10th and 11th  Sister will be caregiver  Needs EGD  Does not need colonoscopy d/t age  NOT allergic to iodine  MELD 9  Needs mamogram  Pt will schedule pap smear locally  Pt will schedule EGD

## 2025-06-21 ENCOUNTER — EPISODE CHANGES (OUTPATIENT)
Dept: TRANSPLANT | Facility: CLINIC | Age: 40
End: 2025-06-21

## 2025-06-22 ENCOUNTER — EPISODE CHANGES (OUTPATIENT)
Dept: TRANSPLANT | Facility: CLINIC | Age: 40
End: 2025-06-22

## 2025-06-30 ENCOUNTER — TELEPHONE (OUTPATIENT)
Dept: TRANSPLANT | Facility: CLINIC | Age: 40
End: 2025-06-30
Payer: MEDICAID

## 2025-06-30 ENCOUNTER — EPISODE CHANGES (OUTPATIENT)
Dept: TRANSPLANT | Facility: CLINIC | Age: 40
End: 2025-06-30

## 2025-06-30 DIAGNOSIS — Z76.82 ORGAN TRANSPLANT CANDIDATE: Primary | ICD-10-CM

## 2025-06-30 DIAGNOSIS — K70.30 ALCOHOLIC CIRRHOSIS, UNSPECIFIED WHETHER ASCITES PRESENT: ICD-10-CM

## 2025-06-30 NOTE — TELEPHONE ENCOUNTER
Pt cancelled FP for next week on 7/10 and 7/11.  States she has a new job and no PTO right now.  Wants OLT evaluation last week of August on 28th and 29th.    She is making a local pap smear appt.  She knows to call and make EGD appt. Orders are in EPIC.    She has f/u appt with Dr. Min on August 11th.

## 2025-07-09 ENCOUNTER — PATIENT MESSAGE (OUTPATIENT)
Dept: ENDOSCOPY | Facility: HOSPITAL | Age: 40
End: 2025-07-09
Payer: MEDICAID

## 2025-07-09 DIAGNOSIS — Z76.82 ORGAN TRANSPLANT CANDIDATE: Primary | ICD-10-CM

## 2025-07-09 DIAGNOSIS — K70.30 ALCOHOLIC CIRRHOSIS, UNSPECIFIED WHETHER ASCITES PRESENT: ICD-10-CM

## 2025-07-10 VITALS — BODY MASS INDEX: 30.78 KG/M2 | HEIGHT: 70 IN | WEIGHT: 215 LBS

## 2025-07-10 NOTE — TELEPHONE ENCOUNTER
Patient is scheduled for a Upper Endoscopy (EGD) on 8/7/25 with Dr. LYNN Diallo  Referral for procedure from  [facility] referral (see Appts tab)

## 2025-08-06 ENCOUNTER — TELEPHONE (OUTPATIENT)
Dept: TRANSPLANT | Facility: CLINIC | Age: 40
End: 2025-08-06
Payer: MEDICAID

## 2025-08-06 DIAGNOSIS — K70.30 ALCOHOLIC CIRRHOSIS, UNSPECIFIED WHETHER ASCITES PRESENT: ICD-10-CM

## 2025-08-06 DIAGNOSIS — Z76.82 ORGAN TRANSPLANT CANDIDATE: Primary | ICD-10-CM

## 2025-08-07 ENCOUNTER — ANESTHESIA EVENT (OUTPATIENT)
Dept: ENDOSCOPY | Facility: HOSPITAL | Age: 40
End: 2025-08-07
Payer: MEDICAID

## 2025-08-07 ENCOUNTER — ANESTHESIA (OUTPATIENT)
Dept: ENDOSCOPY | Facility: HOSPITAL | Age: 40
End: 2025-08-07
Payer: MEDICAID

## 2025-08-07 ENCOUNTER — HOSPITAL ENCOUNTER (OUTPATIENT)
Facility: HOSPITAL | Age: 40
Discharge: HOME | End: 2025-08-07
Attending: INTERNAL MEDICINE | Admitting: INTERNAL MEDICINE
Payer: MEDICAID

## 2025-08-07 VITALS
OXYGEN SATURATION: 98 % | DIASTOLIC BLOOD PRESSURE: 62 MMHG | SYSTOLIC BLOOD PRESSURE: 112 MMHG | TEMPERATURE: 98 F | RESPIRATION RATE: 16 BRPM | HEART RATE: 54 BPM

## 2025-08-07 DIAGNOSIS — K74.60 DECOMPENSATED HEPATIC CIRRHOSIS: Primary | ICD-10-CM

## 2025-08-07 DIAGNOSIS — K72.90 DECOMPENSATED HEPATIC CIRRHOSIS: Primary | ICD-10-CM

## 2025-08-07 DIAGNOSIS — I85.00 ESOPHAGEAL VARICES: ICD-10-CM

## 2025-08-07 PROCEDURE — 37000009 HC ANESTHESIA EA ADD 15 MINS: Mod: TXP | Performed by: INTERNAL MEDICINE

## 2025-08-07 PROCEDURE — 43235 EGD DIAGNOSTIC BRUSH WASH: CPT | Mod: TXP | Performed by: INTERNAL MEDICINE

## 2025-08-07 PROCEDURE — 63600175 PHARM REV CODE 636 W HCPCS: Mod: TXP

## 2025-08-07 PROCEDURE — 37000008 HC ANESTHESIA 1ST 15 MINUTES: Mod: TXP | Performed by: INTERNAL MEDICINE

## 2025-08-07 PROCEDURE — 27201012 HC FORCEPS, HOT/COLD, DISP: Mod: TXP | Performed by: INTERNAL MEDICINE

## 2025-08-07 PROCEDURE — 25000003 PHARM REV CODE 250: Mod: TXP | Performed by: INTERNAL MEDICINE

## 2025-08-07 RX ORDER — LIDOCAINE HYDROCHLORIDE 20 MG/ML
INJECTION, SOLUTION EPIDURAL; INFILTRATION; INTRACAUDAL; PERINEURAL
Status: DISCONTINUED | OUTPATIENT
Start: 2025-08-07 | End: 2025-08-07

## 2025-08-07 RX ORDER — PROPOFOL 10 MG/ML
VIAL (ML) INTRAVENOUS
Status: DISCONTINUED | OUTPATIENT
Start: 2025-08-07 | End: 2025-08-07

## 2025-08-07 RX ORDER — SODIUM CHLORIDE 9 MG/ML
INJECTION, SOLUTION INTRAVENOUS CONTINUOUS
Status: DISCONTINUED | OUTPATIENT
Start: 2025-08-07 | End: 2025-08-07 | Stop reason: HOSPADM

## 2025-08-07 RX ADMIN — PROPOFOL 50 MG: 10 INJECTION, EMULSION INTRAVENOUS at 11:08

## 2025-08-07 RX ADMIN — SODIUM CHLORIDE: 0.9 INJECTION, SOLUTION INTRAVENOUS at 11:08

## 2025-08-07 RX ADMIN — LIDOCAINE HYDROCHLORIDE 100 MG: 20 INJECTION, SOLUTION EPIDURAL; INFILTRATION; INTRACAUDAL; PERINEURAL at 11:08

## 2025-08-07 RX ADMIN — PROPOFOL 200 MCG/KG/MIN: 10 INJECTION, EMULSION INTRAVENOUS at 11:08

## 2025-08-07 NOTE — ANESTHESIA POSTPROCEDURE EVALUATION
Anesthesia Post Evaluation    Patient: Litzy Amato    Procedure(s) Performed: Procedure(s) (LRB):  EGD (ESOPHAGOGASTRODUODENOSCOPY) (N/A)    Final Anesthesia Type: general      Patient location during evaluation: PACU  Patient participation: Yes- Able to Participate  Level of consciousness: awake and alert and oriented  Post-procedure vital signs: reviewed and stable  Pain management: adequate  Airway patency: patent    PONV status at discharge: No PONV  Anesthetic complications: no      Cardiovascular status: hemodynamically stable  Respiratory status: unassisted, spontaneous ventilation and room air  Hydration status: euvolemic  Follow-up not needed.              Vitals Value Taken Time   /62 08/07/25 11:50   Temp 36.7 °C (98 °F) 08/07/25 11:19   Pulse 54 08/07/25 11:50   Resp 16 08/07/25 11:50   SpO2 98 % 08/07/25 11:50         Event Time   Out of Recovery 11:51:58         Pain/Roque Score: No data recorded

## 2025-08-07 NOTE — TRANSFER OF CARE
Anesthesia Transfer of Care Note    Patient: Litzy Amato    Procedure(s) Performed: Procedure(s) (LRB):  EGD (ESOPHAGOGASTRODUODENOSCOPY) (N/A)    Patient location: GI    Anesthesia Type: general    Transport from OR: Transported from OR on room air with adequate spontaneous ventilation    Post pain: adequate analgesia    Post assessment: no apparent anesthetic complications    Post vital signs: stable    Level of consciousness: lethargic    Nausea/Vomiting: no nausea/vomiting    Complications: none    Transfer of care protocol was followed      Last vitals: Visit Vitals  /71 (BP Location: Left arm, Patient Position: Lying)   Pulse (!) 56   Temp 36.5 °C (97.7 °F) (Temporal)   Resp 18   LMP  (LMP Unknown)   SpO2 98%   Breastfeeding No

## 2025-08-07 NOTE — ANESTHESIA PREPROCEDURE EVALUATION
08/07/2025  Litzy Amato is a 40 y.o., female.  Pre-operative evaluation for EGD (ESOPHAGOGASTRODUODENOSCOPY) (N/A)    Chief Complaint:    PMH:  Alcoholic cirrhosis    ESLD with esoph varices, ascites. Stable.  Preparing for OLT.  thrombocytopenia  Past Surgical History:   Procedure Laterality Date    SLEEVE GASTROPLASTY           Vital Signs Range (Last 24H):  Temp:  [36.5 °C (97.7 °F)]   Pulse:  [56]   Resp:  [18]   BP: (106)/(71)   SpO2:  [98 %]       CBC:     Recent Labs   Lab 08/07/25 0914   WBC 3.85*   RBC 4.65   HGB 13.4   HCT 41.7   PLT 93*   MCV 90   MCH 28.8   MCHC 32.1       CMP:   Recent Labs   Lab 08/07/25 0914      K 4.2   CL 99   CO2 29   BUN 12   CREATININE 0.9   *   CALCIUM 9.7   ALBUMIN 4.0   PROT 8.0   ALKPHOS 55   ALT 22   AST 39   BILITOT 1.1*       INR:  Recent Labs   Lab 08/07/25 0914   INR 1.2   PROTIME 13.3*         Diagnostic Studies:      EKG:  NSR    2D Echo:     Pre-op Assessment    I have reviewed the Patient Summary Reports.     I have reviewed the Nursing Notes. I have reviewed the NPO Status.   I have reviewed the Medications.     Review of Systems  Anesthesia Hx:  No problems with previous Anesthesia                Social:  Non-Smoker, No Alcohol Use       Cardiovascular:  Cardiovascular Normal                                              Pulmonary:  Pulmonary Normal                       Hepatic/GI:      Liver Disease,               Neurological:  Neurology Normal                                      Endocrine:        Obesity / BMI > 30      Physical Exam  General: Well nourished, Cooperative, Alert and Oriented    Airway:  Mallampati: II / I  Mouth Opening: Normal  TM Distance: Normal  Tongue: Normal  Neck ROM: Normal ROM    Dental:  Intact    Chest/Lungs:  Normal Respiratory Rate    Abdomen:  Distention        Anesthesia Plan  Type of Anesthesia,  risks & benefits discussed:    Anesthesia Type: Gen Natural Airway  Intra-op Monitoring Plan: Standard ASA Monitors  Post Op Pain Control Plan: multimodal analgesia  Induction:  IV  Informed Consent: Informed consent signed with the Patient and all parties understand the risks and agree with anesthesia plan.  All questions answered.   ASA Score: 3  Day of Surgery Review of History & Physical: H&P Update referred to the surgeon/provider.    Ready For Surgery From Anesthesia Perspective.     .

## 2025-08-07 NOTE — TELEPHONE ENCOUNTER
MsJuliette Litzy Amato would like her 8/11/25 appt with Dr. Min to be virtual.  She is unable to get off work to come in person.  Card given to  to change appt to virtual.

## 2025-08-11 ENCOUNTER — PATIENT MESSAGE (OUTPATIENT)
Dept: TRANSPLANT | Facility: CLINIC | Age: 40
End: 2025-08-11
Payer: MEDICAID

## 2025-08-19 ENCOUNTER — TELEPHONE (OUTPATIENT)
Dept: TRANSPLANT | Facility: CLINIC | Age: 40
End: 2025-08-19
Payer: MEDICAID

## 2025-08-25 ENCOUNTER — TELEPHONE (OUTPATIENT)
Dept: TRANSPLANT | Facility: CLINIC | Age: 40
End: 2025-08-25
Payer: MEDICAID

## 2025-08-27 ENCOUNTER — TELEPHONE (OUTPATIENT)
Dept: TRANSPLANT | Facility: CLINIC | Age: 40
End: 2025-08-27
Payer: MEDICAID

## 2025-08-28 ENCOUNTER — HOSPITAL ENCOUNTER (OUTPATIENT)
Dept: RADIOLOGY | Facility: HOSPITAL | Age: 40
Discharge: HOME | End: 2025-08-28
Attending: INTERNAL MEDICINE
Payer: MEDICAID

## 2025-08-28 ENCOUNTER — CLINICAL SUPPORT (OUTPATIENT)
Dept: INFECTIOUS DISEASES | Facility: CLINIC | Age: 40
End: 2025-08-28
Payer: MEDICAID

## 2025-08-28 ENCOUNTER — EVALUATION (OUTPATIENT)
Dept: TRANSPLANT | Facility: CLINIC | Age: 40
End: 2025-08-28
Payer: MEDICAID

## 2025-08-28 ENCOUNTER — OFFICE VISIT (OUTPATIENT)
Dept: TRANSPLANT | Facility: CLINIC | Age: 40
End: 2025-08-28
Payer: MEDICAID

## 2025-08-28 ENCOUNTER — SOCIAL WORK (OUTPATIENT)
Dept: TRANSPLANT | Facility: CLINIC | Age: 40
End: 2025-08-28
Payer: MEDICAID

## 2025-08-28 ENCOUNTER — CLINICAL SUPPORT (OUTPATIENT)
Dept: TRANSPLANT | Facility: CLINIC | Age: 40
End: 2025-08-28
Payer: MEDICAID

## 2025-08-28 VITALS
DIASTOLIC BLOOD PRESSURE: 73 MMHG | BODY MASS INDEX: 31.38 KG/M2 | OXYGEN SATURATION: 99 % | HEIGHT: 69 IN | HEART RATE: 78 BPM | TEMPERATURE: 97 F | DIASTOLIC BLOOD PRESSURE: 73 MMHG | RESPIRATION RATE: 18 BRPM | WEIGHT: 211.88 LBS | WEIGHT: 211.88 LBS | OXYGEN SATURATION: 99 % | SYSTOLIC BLOOD PRESSURE: 121 MMHG | HEART RATE: 78 BPM | SYSTOLIC BLOOD PRESSURE: 121 MMHG | BODY MASS INDEX: 31.38 KG/M2 | HEIGHT: 69 IN | RESPIRATION RATE: 18 BRPM | TEMPERATURE: 97 F

## 2025-08-28 VITALS
OXYGEN SATURATION: 99 % | RESPIRATION RATE: 18 BRPM | BODY MASS INDEX: 31.38 KG/M2 | HEART RATE: 78 BPM | HEIGHT: 69 IN | TEMPERATURE: 97 F | SYSTOLIC BLOOD PRESSURE: 121 MMHG | WEIGHT: 211.88 LBS | DIASTOLIC BLOOD PRESSURE: 73 MMHG

## 2025-08-28 DIAGNOSIS — F10.20 ALCOHOL USE DISORDER, SEVERE, DEPENDENCE: Chronic | ICD-10-CM

## 2025-08-28 DIAGNOSIS — Z76.82 ORGAN TRANSPLANT CANDIDATE: Primary | ICD-10-CM

## 2025-08-28 DIAGNOSIS — K70.31 ALCOHOLIC CIRRHOSIS OF LIVER WITH ASCITES: Primary | ICD-10-CM

## 2025-08-28 DIAGNOSIS — K70.31 ASCITES DUE TO ALCOHOLIC CIRRHOSIS: Chronic | ICD-10-CM

## 2025-08-28 DIAGNOSIS — Z76.82 ORGAN TRANSPLANT CANDIDATE: ICD-10-CM

## 2025-08-28 DIAGNOSIS — Z00.00 HEALTHCARE MAINTENANCE: Primary | ICD-10-CM

## 2025-08-28 PROCEDURE — 99999 PR PBB SHADOW E&M-EST. PATIENT-LVL IV: CPT | Mod: PBBFAC,TXP,,

## 2025-08-28 PROCEDURE — 76700 US EXAM ABDOM COMPLETE: CPT | Mod: TC,TXP

## 2025-08-28 PROCEDURE — 77063 BREAST TOMOSYNTHESIS BI: CPT | Mod: TC,TXP

## 2025-08-28 PROCEDURE — 99999 PR PBB SHADOW E&M-EST. PATIENT-LVL III: CPT | Mod: PBBFAC,TXP,, | Performed by: INTERNAL MEDICINE

## 2025-08-28 PROCEDURE — 97802 MEDICAL NUTRITION INDIV IN: CPT | Mod: PBBFAC,TXP

## 2025-08-28 PROCEDURE — 99204 OFFICE O/P NEW MOD 45 MIN: CPT | Mod: S$PBB,TXP,, | Performed by: PHYSICIAN ASSISTANT

## 2025-08-28 PROCEDURE — 99204 OFFICE O/P NEW MOD 45 MIN: CPT | Mod: S$PBB,TXP,, | Performed by: TRANSPLANT SURGERY

## 2025-08-28 PROCEDURE — 99999 PR PBB SHADOW E&M-EST. PATIENT-LVL III: CPT | Mod: PBBFAC,,,

## 2025-08-28 PROCEDURE — 99999PBSHW PR PBB SHADOW TECHNICAL ONLY FILED TO HB: Mod: PBBFAC,TXP,,

## 2025-08-28 PROCEDURE — 99213 OFFICE O/P EST LOW 20 MIN: CPT | Mod: PBBFAC,TXP | Performed by: INTERNAL MEDICINE

## 2025-08-28 PROCEDURE — 99214 OFFICE O/P EST MOD 30 MIN: CPT | Mod: PBBFAC,25,27,TXP

## 2025-08-28 PROCEDURE — 99213 OFFICE O/P EST LOW 20 MIN: CPT | Mod: PBBFAC,27

## 2025-08-28 RX ORDER — LEVOCETIRIZINE DIHYDROCHLORIDE 5 MG/1
5 TABLET, FILM COATED ORAL NIGHTLY
COMMUNITY
Start: 2025-07-10

## 2025-08-28 RX ORDER — MONTELUKAST SODIUM 10 MG/1
10 TABLET ORAL DAILY
COMMUNITY
Start: 2025-08-02

## 2025-08-29 ENCOUNTER — HOSPITAL ENCOUNTER (OUTPATIENT)
Dept: RADIOLOGY | Facility: HOSPITAL | Age: 40
Discharge: HOME | End: 2025-08-29
Attending: INTERNAL MEDICINE
Payer: MEDICAID

## 2025-08-29 ENCOUNTER — TELEPHONE (OUTPATIENT)
Dept: TRANSPLANT | Facility: CLINIC | Age: 40
End: 2025-08-29
Payer: MEDICAID

## 2025-08-29 ENCOUNTER — HOSPITAL ENCOUNTER (OUTPATIENT)
Dept: CARDIOLOGY | Facility: HOSPITAL | Age: 40
Discharge: HOME | End: 2025-08-29
Attending: INTERNAL MEDICINE
Payer: MEDICAID

## 2025-08-29 ENCOUNTER — PATIENT MESSAGE (OUTPATIENT)
Dept: TRANSPLANT | Facility: CLINIC | Age: 40
End: 2025-08-29
Payer: MEDICAID

## 2025-09-02 ENCOUNTER — TELEPHONE (OUTPATIENT)
Dept: INFECTIOUS DISEASES | Facility: CLINIC | Age: 40
End: 2025-09-02
Payer: MEDICAID

## 2025-09-04 ENCOUNTER — TELEPHONE (OUTPATIENT)
Dept: TRANSPLANT | Facility: CLINIC | Age: 40
End: 2025-09-04
Payer: MEDICAID

## 2025-09-05 ENCOUNTER — TELEPHONE (OUTPATIENT)
Dept: TRANSPLANT | Facility: CLINIC | Age: 40
End: 2025-09-05
Payer: MEDICAID